# Patient Record
Sex: MALE | Race: WHITE | NOT HISPANIC OR LATINO | Employment: OTHER | URBAN - METROPOLITAN AREA
[De-identification: names, ages, dates, MRNs, and addresses within clinical notes are randomized per-mention and may not be internally consistent; named-entity substitution may affect disease eponyms.]

---

## 2017-01-05 ENCOUNTER — OFFICE VISIT (OUTPATIENT)
Dept: FAMILY MEDICINE | Facility: CLINIC | Age: 72
End: 2017-01-05
Payer: COMMERCIAL

## 2017-01-05 VITALS
HEART RATE: 64 BPM | WEIGHT: 140 LBS | TEMPERATURE: 97.2 F | SYSTOLIC BLOOD PRESSURE: 134 MMHG | DIASTOLIC BLOOD PRESSURE: 75 MMHG | BODY MASS INDEX: 24.8 KG/M2 | HEIGHT: 63 IN

## 2017-01-05 DIAGNOSIS — Z12.5 SCREENING FOR PROSTATE CANCER: ICD-10-CM

## 2017-01-05 DIAGNOSIS — I10 HYPERTENSION GOAL BP (BLOOD PRESSURE) < 140/90: ICD-10-CM

## 2017-01-05 DIAGNOSIS — R53.83 FATIGUE, UNSPECIFIED TYPE: ICD-10-CM

## 2017-01-05 DIAGNOSIS — K21.9 GASTROESOPHAGEAL REFLUX DISEASE, ESOPHAGITIS PRESENCE NOT SPECIFIED: Primary | ICD-10-CM

## 2017-01-05 DIAGNOSIS — Z11.9 SCREENING EXAMINATION FOR INFECTIOUS DISEASE: ICD-10-CM

## 2017-01-05 DIAGNOSIS — E78.5 HYPERLIPIDEMIA LDL GOAL <70: ICD-10-CM

## 2017-01-05 DIAGNOSIS — Z98.61 CAD S/P PERCUTANEOUS CORONARY ANGIOPLASTY: ICD-10-CM

## 2017-01-05 DIAGNOSIS — I25.10 CAD S/P PERCUTANEOUS CORONARY ANGIOPLASTY: ICD-10-CM

## 2017-01-05 PROCEDURE — 99214 OFFICE O/P EST MOD 30 MIN: CPT | Performed by: FAMILY MEDICINE

## 2017-01-05 RX ORDER — ATORVASTATIN CALCIUM 40 MG/1
40 TABLET, FILM COATED ORAL DAILY
Qty: 90 TABLET | Refills: 3 | Status: SHIPPED | OUTPATIENT
Start: 2017-01-05 | End: 2018-01-30

## 2017-01-05 RX ORDER — LISINOPRIL 5 MG/1
5 TABLET ORAL DAILY
Qty: 90 TABLET | Refills: 3 | Status: SHIPPED | OUTPATIENT
Start: 2017-01-05 | End: 2018-01-26

## 2017-01-05 RX ORDER — NICOTINE POLACRILEX 4 MG/1
20 GUM, CHEWING ORAL DAILY
Qty: 90 TABLET | Refills: 3 | Status: SHIPPED | OUTPATIENT
Start: 2017-01-05 | End: 2017-12-20

## 2017-01-05 RX ORDER — LISINOPRIL 5 MG/1
TABLET ORAL
Refills: 1 | COMMUNITY
Start: 2016-12-19 | End: 2017-01-05

## 2017-01-05 ASSESSMENT — PAIN SCALES - GENERAL: PAINLEVEL: NO PAIN (0)

## 2017-01-05 NOTE — NURSING NOTE
"Chief Complaint   Patient presents with     Lipids     Gastrophageal Reflux     Health Maintenance     Other     bpa       Initial /75 mmHg  Pulse 64  Temp(Src) 97.2  F (36.2  C) (Oral)  Ht 5' 3\" (1.6 m)  Wt 140 lb (63.504 kg)  BMI 24.81 kg/m2 Estimated body mass index is 24.81 kg/(m^2) as calculated from the following:    Height as of this encounter: 5' 3\" (1.6 m).    Weight as of this encounter: 140 lb (63.504 kg).  BP completed using cuff size: regular taken on the left arm  Padmini Reynaga CMA      "

## 2017-01-05 NOTE — MR AVS SNAPSHOT
After Visit Summary   1/5/2017    Donn Allen    MRN: [id number]           Patient Information     Date Of Birth          1945        Visit Information        Provider Department      1/5/2017 10:20 AM Ha Anderson MD [facility]        Today's Diagnoses     Gastroesophageal reflux disease, esophagitis presence not specified    -  1     Hyperlipidemia LDL goal <70         CAD S/P percutaneous coronary angioplasty         Hypertension goal BP (blood pressure) < 140/90         Screening for prostate cancer         Screening examination for infectious disease         Fatigue, unspecified type           Care Instructions    Stay on 5 mg lisinopril daily    Keep working on healthy diet/exercise     Continue other meds as is    Fasting lab appointment    I will send you lab results a few days later         Follow-ups after your visit        Your next 10 appointments already scheduled     Jan 06, 2017  9:45 AM   LAB with CP LAB   [facility] ([facility]    [address] [id number] [phone]           Patient must bring picture ID.  Patient should be prepared to give a urine specimen  Please do not eat 10-12 hours before your appointment if you are coming in fasting for labs on lipids, cholesterol, or glucose (sugar).  Pregnant women should follow their Care Team instructions. Water with medications is okay. Do not drink coffee or other fluids.   If you have concerns about taking  your medications, please ask at office or if scheduling via [employer], send a message by clicking on Secure Messaging, Message Your Care Team.            May 24, 2017  1:00 PM   New Visit with Loyd Garcia MD   [facility] ([facility])    [address]  [place] [address]   [phone]              Future tests that were ordered for you today     Open Future Orders         "Priority Expected Expires Ordered    Lipid panel reflex to direct LDL Routine  6/5/2017 1/5/2017    Comprehensive metabolic panel Routine  6/5/2017 1/5/2017    TSH with free T4 reflex Routine  6/5/2017 1/5/2017    CBC with platelets differential Routine  6/5/2017 1/5/2017    Prostate spec antigen screen Routine  6/5/2017 1/5/2017    Hepatitis C antibody Routine  6/5/2017 1/5/2017            Who to contact     If you have questions or need follow up information about today's clinic visit or your schedule please contact [facility] directly at [phone].  Normal or non-critical lab and imaging results will be communicated to you by [employer], letter or phone within 4 business days after the clinic has received the results. If you do not hear from us within 7 days, please contact the clinic through [employer] or phone. If you have a critical or abnormal lab result, we will notify you by phone as soon as possible.  Submit refill requests through [employer] or call your pharmacy and they will forward the refill request to us. Please allow 3 business days for your refill to be completed.          Additional Information About Your Visit        [employer] Information     [employer] gives you secure access to your electronic health record. If you see a primary care provider, you can also send messages to your care team and make appointments. If you have questions, please call your primary care clinic.  If you do not have a primary care provider, please call [phone] and they will assist you.        Care EveryWhere ID     This is your Care EveryWhere ID. This could be used by other organizations to access your [place] medical records  [id number]        Your Vitals Were     Pulse Temperature Height BMI (Body Mass Index)          64 97.2  F (36.2  C) (Oral) 5' 3\" (1.6 m) 24.81 kg/m2         Blood Pressure from Last 3 Encounters:   01/05/17 134/75   12/21/16 139/81   06/20/16 122/72    Weight from Last 3 " Encounters:   01/05/17 140 lb (63.504 kg)   06/20/16 134 lb (60.782 kg)   04/26/16 142 lb (64.411 kg)                 Today's Medication Changes          These changes are accurate as of: 1/5/17 11:04 AM.  If you have any questions, ask your nurse or doctor.               These medicines have changed or have updated prescriptions.        Dose/Directions    lisinopril 5 MG tablet   Commonly known as:  PRINIVIL/ZESTRIL   This may have changed:    - how much to take  - how to take this  - when to take this   Used for:  Hypertension goal BP (blood pressure) < 140/90   Changed by:  Ha Anderson MD        Dose:  5 mg   Take 1 tablet (5 mg) by mouth daily   Quantity:  90 tablet   Refills:  3            Where to get your medicines      These medications were sent to [facility]  [place] - [address] [place] [address]  [address] [address]     Phone:  [phone]    - atorvastatin 40 MG tablet  - lisinopril 5 MG tablet  - omeprazole 20 MG tablet             Primary Care Provider Office Phone # Fax #    Ha Anderson [phone]       [facility] [address] [facility] [address]        Thank you!     Thank you for choosing [facility]  for your care. Our goal is always to provide you with excellent care. Hearing back from our patients is one way we can continue to improve our services. Please take a few minutes to complete the written survey that you may receive in the mail after your visit with us. Thank you!             Your Updated Medication List - Protect others around you: Learn how to safely use, store and throw away your medicines at www.disposemymeds.org.          This list is accurate as of: 1/5/17 11:04 AM.  Always use your most recent med list.                   Brand Name Dispense Instructions for use    aspirin 81 MG tablet      Take 1 tablet by mouth daily.       atorvastatin 40 MG tablet    LIPITOR     90 tablet    Take 1 tablet (40 mg) by mouth daily (Will need clinic visit and labs for more refills)       lisinopril 5 MG tablet    PRINIVIL/ZESTRIL    90 tablet    Take 1 tablet (5 mg) by mouth daily       omeprazole 20 MG tablet     90 tablet    Take 1 tablet (20 mg) by mouth daily Take 30-60 minutes before a meal.

## 2017-01-05 NOTE — PROGRESS NOTES
SUBJECTIVE:                                                    Donn Allen is a 71 year old male who presents to clinic today for the following health issues:       Hyperlipidemia Follow-Up      Rate your low fat/cholesterol diet?: good    Taking statin?  Yes, no muscle aches from statin    Other lipid medications/supplements?:  none       Amount of exercise or physical activity: 6-7 days/week for an average of 30-45 minutes    Problems taking medications regularly: No    Medication side effects: none    Diet: low salt and low fat/cholesterol         Problem list and histories reviewed & adjusted, as indicated.  Additional history: as documented             HPI      ROS    Some arthritis in feet and legs    Treadmill and airdyne for exercise    Walk/ run with dogs      Physical Exam   Constitutional: He is oriented to person, place, and time and well-developed, well-nourished, and in no distress. No distress.   HENT:   Head: Normocephalic and atraumatic.   Eyes: Conjunctivae are normal.   Neck: Carotid bruit is not present.   Cardiovascular: Normal rate, regular rhythm, normal heart sounds and intact distal pulses.  Exam reveals no gallop and no friction rub.    No murmur heard.  Pulmonary/Chest: Effort normal and breath sounds normal. No respiratory distress. He has no wheezes. He has no rales.   Musculoskeletal: He exhibits no edema.   Neurological: He is alert and oriented to person, place, and time.   Skin: He is not diaphoretic.   Psychiatric: Mood and affect normal.       ASSESSMENT / PLAN:  (K21.9) Gastroesophageal reflux disease, esophagitis presence not specified  (primary encounter diagnosis)  Comment: stable   Plan: omeprazole 20 MG tablet        Refill med     (E78.5) Hyperlipidemia LDL goal <70  Comment: check when fasting   Plan: atorvastatin (LIPITOR) 40 MG tablet, Lipid         panel reflex to direct LDL, Comprehensive         metabolic panel        Refill med     (I25.10,  Z98.61) CAD S/P  percutaneous coronary angioplasty  Comment: no angina; over 2 years since stents   Plan: monitor    (I10) Hypertension goal BP (blood pressure) < 140/90  Comment: at goal   Plan: lisinopril (PRINIVIL/ZESTRIL) 5 MG tablet        Refill low dose acei    (Z12.5) Screening for prostate cancer  Comment: psa   Plan: Prostate spec antigen screen             (Z11.9) Screening examination for infectious disease  Comment: check   Plan: Hepatitis C antibody             (R53.83) Fatigue, unspecified type  Comment: check   Plan: TSH with free T4 reflex, CBC with platelets         differential               I reviewed the patient's medications, allergies, medical history, family history, and social history.    Ha Anderson MD

## 2017-01-05 NOTE — PATIENT INSTRUCTIONS
Stay on 5 mg lisinopril daily    Keep working on healthy diet/exercise     Continue other meds as is    Fasting lab appointment    I will send you lab results a few days later

## 2017-01-06 DIAGNOSIS — Z12.5 SCREENING FOR PROSTATE CANCER: ICD-10-CM

## 2017-01-06 DIAGNOSIS — Z11.9 SCREENING EXAMINATION FOR INFECTIOUS DISEASE: ICD-10-CM

## 2017-01-06 DIAGNOSIS — R53.83 FATIGUE, UNSPECIFIED TYPE: ICD-10-CM

## 2017-01-06 DIAGNOSIS — E78.5 HYPERLIPIDEMIA LDL GOAL <70: ICD-10-CM

## 2017-01-06 LAB
ALBUMIN SERPL-MCNC: 4.3 G/DL (ref 3.4–5)
ALP SERPL-CCNC: 90 U/L (ref 40–150)
ALT SERPL W P-5'-P-CCNC: 43 U/L (ref 0–70)
ANION GAP SERPL CALCULATED.3IONS-SCNC: 9 MMOL/L (ref 3–14)
AST SERPL W P-5'-P-CCNC: 32 U/L (ref 0–45)
BASOPHILS # BLD AUTO: 0 10E9/L (ref 0–0.2)
BASOPHILS NFR BLD AUTO: 0.3 %
BILIRUB SERPL-MCNC: 0.6 MG/DL (ref 0.2–1.3)
BUN SERPL-MCNC: 21 MG/DL (ref 7–30)
CALCIUM SERPL-MCNC: 9.3 MG/DL (ref 8.5–10.1)
CHLORIDE SERPL-SCNC: 103 MMOL/L (ref 94–109)
CHOLEST SERPL-MCNC: 168 MG/DL
CO2 SERPL-SCNC: 26 MMOL/L (ref 20–32)
CREAT SERPL-MCNC: 1.04 MG/DL (ref 0.66–1.25)
DIFFERENTIAL METHOD BLD: ABNORMAL
EOSINOPHIL # BLD AUTO: 0.1 10E9/L (ref 0–0.7)
EOSINOPHIL NFR BLD AUTO: 2.3 %
ERYTHROCYTE [DISTWIDTH] IN BLOOD BY AUTOMATED COUNT: 13.1 % (ref 10–15)
GFR SERPL CREATININE-BSD FRML MDRD: 70 ML/MIN/1.7M2
GLUCOSE SERPL-MCNC: 93 MG/DL (ref 70–99)
HCT VFR BLD AUTO: 40.8 % (ref 40–53)
HDLC SERPL-MCNC: 93 MG/DL
HGB BLD-MCNC: 13.7 G/DL (ref 13.3–17.7)
LDLC SERPL CALC-MCNC: 64 MG/DL
LYMPHOCYTES # BLD AUTO: 2.5 10E9/L (ref 0.8–5.3)
LYMPHOCYTES NFR BLD AUTO: 41.3 %
MCH RBC QN AUTO: 31.6 PG (ref 26.5–33)
MCHC RBC AUTO-ENTMCNC: 33.6 G/DL (ref 31.5–36.5)
MCV RBC AUTO: 94 FL (ref 78–100)
MONOCYTES # BLD AUTO: 0.5 10E9/L (ref 0–1.3)
MONOCYTES NFR BLD AUTO: 7.4 %
NEUTROPHILS # BLD AUTO: 3 10E9/L (ref 1.6–8.3)
NEUTROPHILS NFR BLD AUTO: 48.7 %
NONHDLC SERPL-MCNC: 75 MG/DL
PLATELET # BLD AUTO: 220 10E9/L (ref 150–450)
POTASSIUM SERPL-SCNC: 4.4 MMOL/L (ref 3.4–5.3)
PROT SERPL-MCNC: 8 G/DL (ref 6.8–8.8)
PSA SERPL-ACNC: 3.45 UG/L (ref 0–4)
RBC # BLD AUTO: 4.34 10E12/L (ref 4.4–5.9)
SODIUM SERPL-SCNC: 138 MMOL/L (ref 133–144)
TRIGL SERPL-MCNC: 54 MG/DL
TSH SERPL DL<=0.005 MIU/L-ACNC: 2.96 MU/L (ref 0.4–4)
VARIANT LYMPHS BLD QL SMEAR: PRESENT
WBC # BLD AUTO: 6.1 10E9/L (ref 4–11)

## 2017-01-06 PROCEDURE — 86803 HEPATITIS C AB TEST: CPT | Mod: 90 | Performed by: FAMILY MEDICINE

## 2017-01-06 PROCEDURE — 80061 LIPID PANEL: CPT | Performed by: FAMILY MEDICINE

## 2017-01-06 PROCEDURE — G0103 PSA SCREENING: HCPCS | Performed by: FAMILY MEDICINE

## 2017-01-06 PROCEDURE — 80053 COMPREHEN METABOLIC PANEL: CPT | Performed by: FAMILY MEDICINE

## 2017-01-06 PROCEDURE — 36415 COLL VENOUS BLD VENIPUNCTURE: CPT | Performed by: FAMILY MEDICINE

## 2017-01-06 PROCEDURE — 85025 COMPLETE CBC W/AUTO DIFF WBC: CPT | Performed by: FAMILY MEDICINE

## 2017-01-06 PROCEDURE — 84443 ASSAY THYROID STIM HORMONE: CPT | Performed by: FAMILY MEDICINE

## 2017-01-06 PROCEDURE — 99000 SPECIMEN HANDLING OFFICE-LAB: CPT | Performed by: FAMILY MEDICINE

## 2017-01-09 LAB — HCV AB SERPL QL IA: NORMAL

## 2017-01-11 ENCOUNTER — OFFICE VISIT (OUTPATIENT)
Dept: FAMILY MEDICINE | Facility: CLINIC | Age: 72
End: 2017-01-11
Payer: COMMERCIAL

## 2017-01-11 VITALS
WEIGHT: 140 LBS | HEART RATE: 75 BPM | SYSTOLIC BLOOD PRESSURE: 109 MMHG | BODY MASS INDEX: 24.81 KG/M2 | TEMPERATURE: 96.9 F | DIASTOLIC BLOOD PRESSURE: 64 MMHG

## 2017-01-11 DIAGNOSIS — J30.89 PERENNIAL ALLERGIC RHINITIS, UNSPECIFIED ALLERGIC RHINITIS TRIGGER: Primary | ICD-10-CM

## 2017-01-11 DIAGNOSIS — H69.90 DYSFUNCTION OF EUSTACHIAN TUBE, UNSPECIFIED LATERALITY: ICD-10-CM

## 2017-01-11 DIAGNOSIS — I10 HYPERTENSION GOAL BP (BLOOD PRESSURE) < 140/90: ICD-10-CM

## 2017-01-11 DIAGNOSIS — Z91.09 ENVIRONMENTAL ALLERGIES: ICD-10-CM

## 2017-01-11 PROCEDURE — 99213 OFFICE O/P EST LOW 20 MIN: CPT | Performed by: FAMILY MEDICINE

## 2017-01-11 RX ORDER — PSEUDOEPHEDRINE HCL 120 MG/1
120 TABLET, FILM COATED, EXTENDED RELEASE ORAL EVERY 12 HOURS PRN
Qty: 28 TABLET | Refills: 1 | Status: SHIPPED | OUTPATIENT
Start: 2017-01-11 | End: 2017-02-17

## 2017-01-11 RX ORDER — FLUTICASONE PROPIONATE 50 MCG
1-2 SPRAY, SUSPENSION (ML) NASAL DAILY
Qty: 1 BOTTLE | Refills: 11 | Status: SHIPPED | OUTPATIENT
Start: 2017-01-11 | End: 2018-02-05

## 2017-01-11 ASSESSMENT — PAIN SCALES - GENERAL: PAINLEVEL: NO PAIN (0)

## 2017-01-11 NOTE — NURSING NOTE
"Chief Complaint   Patient presents with     Ear Problem     ear plugged       Initial /64 mmHg  Pulse 75  Temp(Src) 96.9  F (36.1  C) (Oral)  Wt 140 lb (63.504 kg) Estimated body mass index is 24.81 kg/(m^2) as calculated from the following:    Height as of 1/5/17: 5' 3\" (1.6 m).    Weight as of this encounter: 140 lb (63.504 kg).  BP completed using cuff size: regular taken on the left arm  Padmini Reynaga CMA      "

## 2017-01-11 NOTE — PROGRESS NOTES
SUBJECTIVE:                                                    Donn Allen is a 71 year old male who presents to clinic today for the following health issues:       Right ear plugged for the past 4 days         Problem list and histories reviewed & adjusted, as indicated.  Additional history: as documented             HPI      ROS  Hit pipe with a clamp, and had sudden onset ringing in right ear    No pain    Feels plugged since then    Been waiting for it to open up and hasn't yet    Congested nasally ongoing, allergies    Not worse recently    If patient swims in cold water has to use earplugs on right or else it plugs up; this has been going on for years        Physical Exam   Constitutional: He is oriented to person, place, and time and well-developed, well-nourished, and in no distress. No distress.   HENT:   Head: Normocephalic and atraumatic.   Right Ear: Tympanic membrane, external ear and ear canal normal.   Left Ear: Tympanic membrane, external ear and ear canal normal.   Mouth/Throat: Oropharynx is clear and moist.   Nasal mucosa red, swollen bilaterally; not tender over sinuses or submandib area   Eyes: Conjunctivae are normal.   Neck: Carotid bruit is not present.   Cardiovascular: Normal rate, regular rhythm, normal heart sounds and intact distal pulses.  Exam reveals no gallop and no friction rub.    No murmur heard.  Pulmonary/Chest: Effort normal and breath sounds normal. No respiratory distress. He has no wheezes. He has no rales.   Musculoskeletal: He exhibits no edema.   Neurological: He is alert and oriented to person, place, and time.   Skin: He is not diaphoretic.   Psychiatric: Mood and affect normal.       ASSESSMENT / PLAN:  (J30.89) Perennial allergic rhinitis, unspecified allergic rhinitis trigger  (primary encounter diagnosis)  Comment: prudent to try nasal steroid spray.  Use for several weeks as trial.   Plan: fluticasone (FLONASE) 50 MCG/ACT spray             (H69.80) Dysfunction  of eustachian tube, unspecified laterality  Comment: no need to irrigate ears out.  No wax present.  Likely eustacian tube issue.   Plan: pseudoePHEDrine (SUDAFED) 120 MG 12 hr tablet        Trial of this as needed.     (I10) Hypertension goal BP (blood pressure) < 140/90  Comment: at goal.   Possible effect on blood pressure from sudafed discussed.   Plan: continue same blood pressure meds     (Z91.09) Environmental allergies  Comment: could also use over the counter loratadine for histamine related symptoms   Plan: discussed    Also reviewed recent labs, all good       I reviewed the patient's medications, allergies, medical history, family history, and social history.    Ha Anderson MD

## 2017-01-11 NOTE — PATIENT INSTRUCTIONS
Start the nasal steroid spray and take on regular basis once daily for at least 2-3 weeks.  Okay to continue longer term if helpful.    Use the pseudophedrine as needed for eustacian tube dysfunction / head congestion    Could use over the counter loratadine ( generic claritin ) once daily ( antihistamine )     Call in a few weeks if symptoms not improving

## 2017-01-11 NOTE — MR AVS SNAPSHOT
After Visit Summary   1/11/2017    Donn Allen    MRN: [id number]           Patient Information     Date Of Birth          1945        Visit Information        Provider Department      1/11/2017 1:20 PM Ha Anderson MD [facility]        Today's Diagnoses     Perennial allergic rhinitis, unspecified allergic rhinitis trigger    -  1     Dysfunction of eustachian tube, unspecified laterality           Care Instructions    Start the nasal steroid spray and take on regular basis once daily for at least 2-3 weeks.  Okay to continue longer term if helpful.    Use the pseudophedrine as needed for eustacian tube dysfunction / head congestion    Could use over the counter loratadine ( generic claritin ) once daily ( antihistamine )     Call in a few weeks if symptoms not improving            Follow-ups after your visit        Your next 10 appointments already scheduled     May 24, 2017  1:00 PM   New Visit with Loyd Garcia MD   [facility] ([facility])    [address] [place] [address] [phone]              Who to contact     If you have questions or need follow up information about today's clinic visit or your schedule please contact [facility] directly at [phone].  Normal or non-critical lab and imaging results will be communicated to you by MyChart, letter or phone within 4 business days after the clinic has received the results. If you do not hear from us within 7 days, please contact the clinic through MyChart or phone. If you have a critical or abnormal lab result, we will notify you by phone as soon as possible.  Submit refill requests through [employer] or call your pharmacy and they will forward the refill request to us. Please allow 3 business days for your refill to be completed.          Additional Information About Your Visit        [employer] Information     [employer] gives you secure access  to your electronic health record. If you see a primary care provider, you can also send messages to your care team and make appointments. If you have questions, please call your primary care clinic.  If you do not have a primary care provider, please call [phone] and they will assist you.        Care EveryWhere ID     This is your Care EveryWhere ID. This could be used by other organizations to access your [place] medical records  [id number]        Your Vitals Were     Pulse Temperature                75 96.9  F (36.1  C) (Oral)           Blood Pressure from Last 3 Encounters:   01/11/17 109/64   01/05/17 134/75   12/21/16 139/81    Weight from Last 3 Encounters:   01/11/17 140 lb (63.504 kg)   01/05/17 140 lb (63.504 kg)   06/20/16 134 lb (60.782 kg)              Today, you had the following     No orders found for display         Today's Medication Changes          These changes are accurate as of: 1/11/17  1:39 PM.  If you have any questions, ask your nurse or doctor.               Start taking these medicines.        Dose/Directions    fluticasone 50 MCG/ACT spray   Commonly known as:  FLONASE   Used for:  Perennial allergic rhinitis, unspecified allergic rhinitis trigger   Started by:  Ha Anderson MD        Dose:  1-2 spray   Spray 1-2 sprays into both nostrils daily   Quantity:  1 Bottle   Refills:  11       pseudoePHEDrine 120 MG 12 hr tablet   Commonly known as:  SUDAFED   Used for:  Dysfunction of eustachian tube, unspecified laterality   Started by:  Ha Anderson MD        Dose:  120 mg   Take 1 tablet (120 mg) by mouth every 12 hours as needed for congestion   Quantity:  28 tablet   Refills:  1            Where to get your medicines      Some of these will need a paper prescription and others can be bought over the counter.  Ask your nurse if you have questions.     Bring a paper prescription for each of these medications    - fluticasone 50 MCG/ACT spray  - pseudoePHEDrine 120 MG 12 hr  tablet             Primary Care Provider Office Phone # Fax #    Ha Anderson [phone]       [facility] [address]  [facility] [address]        Thank you!     Thank you for choosing [facility]  for your care. Our goal is always to provide you with excellent care. Hearing back from our patients is one way we can continue to improve our services. Please take a few minutes to complete the written survey that you may receive in the mail after your visit with us. Thank you!             Your Updated Medication List - Protect others around you: Learn how to safely use, store and throw away your medicines at www.disposemymeds.org.          This list is accurate as of: 1/11/17  1:39 PM.  Always use your most recent med list.                   Brand Name Dispense Instructions for use    aspirin 81 MG tablet      Take 1 tablet by mouth daily.       atorvastatin 40 MG tablet    LIPITOR    90 tablet    Take 1 tablet (40 mg) by mouth daily (Will need clinic visit and labs for more refills)       fluticasone 50 MCG/ACT spray    FLONASE    1 Bottle    Spray 1-2 sprays into both nostrils daily       lisinopril 5 MG tablet    PRINIVIL/ZESTRIL    90 tablet    Take 1 tablet (5 mg) by mouth daily       omeprazole 20 MG tablet     90 tablet    Take 1 tablet (20 mg) by mouth daily Take 30-60 minutes before a meal.       pseudoePHEDrine 120 MG 12 hr tablet    SUDAFED    28 tablet    Take 1 tablet (120 mg) by mouth every 12 hours as needed for congestion

## 2017-01-23 ENCOUNTER — TELEPHONE (OUTPATIENT)
Dept: FAMILY MEDICINE | Facility: CLINIC | Age: 72
End: 2017-01-23

## 2017-01-23 DIAGNOSIS — H93.19 TINNITUS, UNSPECIFIED LATERALITY: ICD-10-CM

## 2017-01-23 DIAGNOSIS — H93.8X9 PLUGGED FEELING IN EAR, UNSPECIFIED LATERALITY: Primary | ICD-10-CM

## 2017-01-23 NOTE — TELEPHONE ENCOUNTER
Routed to provider to advise on ENT referral.    Alison Jeffrey RN  [facility]

## 2017-01-23 NOTE — TELEPHONE ENCOUNTER
Reason for Call: Request for an order or referral:    Referral being requested: ENT    Date needed: as soon as possible    Has the patient been seen by the PCP for this problem? YES    Additional comments: Patient was seen last week and was told if he was not better you would put a referral into the ENT for him. Please call him when completed.    Phone number Patient can be reached at:  Home number on file [phone] (home)    Best Time:  anytime    Can we leave a detailed message on this number?  YES    Call taken on 1/23/2017 at 7:22 AM by Elda Zaldivar

## 2017-01-23 NOTE — TELEPHONE ENCOUNTER
I see referral in Epic.  I called patient and spoke to him by phone, he is walking his dog, requests I call back and leave the contact phone number on ENT referral on his voicemail, he will hang up and not answer.  Done.  Clinic number provided if he has further questions.  Salina Hyatt RN  [facility]

## 2017-01-25 ENCOUNTER — OFFICE VISIT (OUTPATIENT)
Dept: OTOLARYNGOLOGY | Facility: CLINIC | Age: 72
End: 2017-01-25
Payer: COMMERCIAL

## 2017-01-25 ENCOUNTER — OFFICE VISIT (OUTPATIENT)
Dept: AUDIOLOGY | Facility: CLINIC | Age: 72
End: 2017-01-25
Payer: COMMERCIAL

## 2017-01-25 VITALS — WEIGHT: 139.6 LBS | BODY MASS INDEX: 24.73 KG/M2 | HEIGHT: 63 IN | RESPIRATION RATE: 16 BRPM

## 2017-01-25 DIAGNOSIS — H90.5 SNHL (SENSORINEURAL HEARING LOSS): Primary | ICD-10-CM

## 2017-01-25 DIAGNOSIS — H83.3X3: ICD-10-CM

## 2017-01-25 DIAGNOSIS — H90.3 SENSORINEURAL HEARING LOSS, BILATERAL: Primary | ICD-10-CM

## 2017-01-25 PROCEDURE — 92550 TYMPANOMETRY & REFLEX THRESH: CPT | Performed by: AUDIOLOGIST

## 2017-01-25 PROCEDURE — 99214 OFFICE O/P EST MOD 30 MIN: CPT | Performed by: OTOLARYNGOLOGY

## 2017-01-25 PROCEDURE — 99207 ZZC NO CHARGE LOS: CPT | Performed by: AUDIOLOGIST

## 2017-01-25 PROCEDURE — 92557 COMPREHENSIVE HEARING TEST: CPT | Performed by: AUDIOLOGIST

## 2017-01-25 ASSESSMENT — PAIN SCALES - GENERAL: PAINLEVEL: NO PAIN (0)

## 2017-01-25 NOTE — PROGRESS NOTES
"AUDIOLOGY REPORT    SUBJECTIVE:  Donn Allen is a 71 year old male who was seen in the Audiology Clinic at [facility] for an audiologic evaluation, referred by Dr. Botello. The patient reports a \"plugged\" sensation in the right ear for the last few weeks. He also reports a history of noise exposure with occasional hearing protection worn (construction work, shooting, and railroad work). The patient denies bilateral otalgia, bilateral drainage, bilateral hearing loss, michell-surgeries, and vertigo.     OBJECTIVE:  Otoscopic exam indicates ears are clear of cerumen bilaterally     Pure Tone Thresholds assessed using conventional audiometry with good reliability from 250-8000 Hz bilaterally using insert earphones and circumaural headphones     RIGHT:  normal to moderate primarily sensorineural hearing loss    LEFT:    normal to moderate sensorineural hearing loss    Tympanogram:    RIGHT: normal eardrum mobility    LEFT:   normal eardrum mobility    Reflexes (reported by stimulus ear):  RIGHT: Ipsilateral is present at normal levels  RIGHT: Contralateral is present at normal levels  LEFT:   Ipsilateral is present at normal levels  LEFT:   Contralateral could not maintain a seal.     Speech Reception Threshold:    RIGHT: 20 dB HL    LEFT:   10 dB HL    Word Recognition Score:     RIGHT: 96% at 55 dB HL using NU-6 recorded word list.    LEFT:   100% at 50 dB HL using NU-6 recorded word list.      ASSESSMENT:   Normal to moderate primarily sensorineural hearing loss, bilaterally.     Today s results were discussed with the patient in detail.     PLAN: It is recommended that the patient follow up with Dr. Botello today as scheduled. Retest per ENT or if symptoms worsen. Patient was counseled regarding hearing loss and impact on communication. Please call this clinic with questions regarding these results or recommendations.      Dacia Wilson, F-AAA   Clinical Audiologist, [place] #[id number]   1/25/2017        "

## 2017-01-25 NOTE — PROGRESS NOTES
Chief Complaint - Hearing loss    History of Present Illness - Donn Allen is a 71 year old male who returns to me today with hearing loss in the right ear.  It has been present and noticeable for approximately 2 weeks.  It was sudden in onset after loud noise exposure with a hammer. Had some tinnitus, but that's gone. Now it's plugged.  There is no history of chronic ear disease or ear surgery. Has some surfer's ear.  With regards to recreational, , and work-related noise exposure he has some. The patient denies vertigo, otorrhea, otalgia. I saw him in 2014 for hearing loss and exostosis in ears. He doesn't use aids.     Past Medical History -   Patient Active Problem List   Diagnosis     Advanced directives, counseling/discussion     CAD S/P percutaneous coronary angioplasty     Hyperlipidemia LDL goal <70     Lattice degeneration     Epiretinal membrane, right eye     History of vitrectomy, od      Aphakia     Legal blindness, od     Glaucoma suspect     Lightheadedness     Gastroesophageal reflux disease, esophagitis presence not specified     Hypertension goal BP (blood pressure) < 140/90       Current Medications -   Current outpatient prescriptions:      fluticasone (FLONASE) 50 MCG/ACT spray, Spray 1-2 sprays into both nostrils daily, Disp: 1 Bottle, Rfl: 11     pseudoePHEDrine (SUDAFED) 120 MG 12 hr tablet, Take 1 tablet (120 mg) by mouth every 12 hours as needed for congestion, Disp: 28 tablet, Rfl: 1     omeprazole 20 MG tablet, Take 1 tablet (20 mg) by mouth daily Take 30-60 minutes before a meal., Disp: 90 tablet, Rfl: 3     atorvastatin (LIPITOR) 40 MG tablet, Take 1 tablet (40 mg) by mouth daily (Will need clinic visit and labs for more refills), Disp: 90 tablet, Rfl: 3     lisinopril (PRINIVIL/ZESTRIL) 5 MG tablet, Take 1 tablet (5 mg) by mouth daily, Disp: 90 tablet, Rfl: 3     aspirin 81 MG tablet, Take 1 tablet by mouth daily., Disp: , Rfl:     Allergies -   Allergies   Allergen Reactions  "    Ibuprofen Sodium      ibu     Tetanus Toxoid        Social History -   Social History     Social History     Marital Status:      Spouse Name: Carolyn     Number of Children: 4     Years of Education: 14+     Occupational History     Builder      Semi-retired      Retired     Social History Main Topics     Smoking status: Former Smoker     Types: Cigarettes     Quit date: 01/11/1972     Smokeless tobacco: Never Used      Comment: non-smoking household     Alcohol Use: Yes      Comment: Whiskey-2 shots daily till Oct. 2014     Drug Use: No     Sexual Activity:     Partners: Female     Other Topics Concern     None     Social History Narrative       Family History -   Family History   Problem Relation Age of Onset     CANCER Father 73     lymphoma     CANCER Brother 73     pancreatic cancer 1/2 brother     CANCER Brother 83     stomach? 1/2 brother     DIABETES Maternal Aunt      CEREBROVASCULAR DISEASE No family hx of      C.A.D. No family hx of      Alzheimer Disease No family hx of      Neurologic Disorder No family hx of        Review of Systems - As per HPI and PMHx, otherwise 7 system review of the head and neck negative.    Exam -   Resp 16  Ht 1.6 m (5' 3\")  Wt 63.322 kg (139 lb 9.6 oz)  BMI 24.74 kg/m2  General - The patient is in no distress.  Alert and oriented to person and place, answers questions and cooperates with examination appropriately.   Voice and Breathing - The patient was breathing comfortably without the use of accessory muscles. There was no wheezing, stridor, or stertor.  The patients voice was clear and strong.  Ears - The auricles are normal. Has exostosis in each ear, but no wax buildup. The tympanic membranes are normal in appearance, bony landmarks are intact.  No retraction, perforation, or masses.  No fluid or purulence was seen in the external canal or the middle ear. No evidence of infection of the middle ear or external canal, cerumen was normal in appearance.  Eyes - " right pupil was dilated.  Neck - Palpation of the occipital, submental, submandibular, internal jugular chain, and supraclavicular nodes did not demonstrate any abnormal lymph nodes or masses. Parotid glands had no masses. Palpation of the thyroid was soft and smooth, with no nodules or goiter appreciated.  The trachea was mobile and midline.  Neurological - Cranial nerves 2 through 12 were grossly intact. House-Brackmann grade 1 out of 6 bilaterally.       Audiologic Studies - An audiogram and tympanogram were performed today as part of the evaluation and personally reviewed. The tympanogram shows a normal Type A curve, with normal canal volume and middle ear pressure.  There is no sign of eustachian tube dysfunction or middle ear effusion.  The audiogram showed a significant down-sloping high frequency sensorineural hearing loss with jon at 4k to suggest noise exposure.  It really hasn't changed significantly since 2014.       Assessment and Plan - Donn Allen is a 71 year old male who presents to me today with hearing loss, sensorineural. Also some noise-induced hearing loss. Most recently he has right ear plugged sensation. This could be worsening noise exposure, or just it is bothering him more. The hearing test was mostly unchanged. I provided reassurance. We discussed hearing protection in noisy environments.    The patient will follow up as necessary for worsening symptoms or changes in symptoms. I have also recommended yearly audiograms.    Thom Botello MD  Otolaryngology  [facility]

## 2017-01-25 NOTE — NURSING NOTE
"Chief Complaint   Patient presents with     RECHECK     Ears. Feel plugged       Initial Resp 16  Ht 1.6 m (5' 3\")  Wt 63.322 kg (139 lb 9.6 oz)  BMI 24.74 kg/m2 Estimated body mass index is 24.74 kg/(m^2) as calculated from the following:    Height as of this encounter: 1.6 m (5' 3\").    Weight as of this encounter: 63.322 kg (139 lb 9.6 oz).  BP completed using cuff size: NA (Not Taken)    Angeline Alvarenga MA    "

## 2017-01-25 NOTE — PATIENT INSTRUCTIONS
General Scheduling Information  To schedule your CT/MRI scan, please contact [place] [facility] at [phone]   [address] [place] [address]    To schedule your Surgery, please contact our Specialty Schedulers at [phone]    ENT Clinic Locations Clinic Hours Telephone Number     [facility]  [address] [place] [address]   Tuesday:       8:00am -- 4:00pm    Wednesday:  8:00am - 4:00pm   To schedule an appointment with   Dr. Botello,   please contact our   Specialty Scheduling Department at:     [phone]       [facility]y Donnelly  [address] [place] [address]   Friday:          8:00am - 4:00pm         Urgent Care Locations Clinic Hours Telephone Numbers     [facility]  [address] [place] [address]     Monday-Friday:     11:00pm - 9:00pm    Saturday-Sunday:  9:00am - 5:00pm   [phone]     [facility]y Donnelly  [address] [place] [address]     Monday-Friday:      5:00pm - 9:00pm     Saturday-Sunday:  9:00am - 5:00pm   [phone]

## 2017-02-17 ENCOUNTER — OFFICE VISIT (OUTPATIENT)
Dept: FAMILY MEDICINE | Facility: CLINIC | Age: 72
End: 2017-02-17
Payer: COMMERCIAL

## 2017-02-17 ENCOUNTER — TELEPHONE (OUTPATIENT)
Dept: FAMILY MEDICINE | Facility: CLINIC | Age: 72
End: 2017-02-17

## 2017-02-17 VITALS
WEIGHT: 131 LBS | SYSTOLIC BLOOD PRESSURE: 129 MMHG | TEMPERATURE: 98 F | DIASTOLIC BLOOD PRESSURE: 73 MMHG | HEART RATE: 74 BPM | BODY MASS INDEX: 23.21 KG/M2

## 2017-02-17 DIAGNOSIS — I10 HYPERTENSION GOAL BP (BLOOD PRESSURE) < 140/90: ICD-10-CM

## 2017-02-17 DIAGNOSIS — R20.2 TINGLING: ICD-10-CM

## 2017-02-17 DIAGNOSIS — H93.8X1 PLUGGED FEELING IN EAR, RIGHT: ICD-10-CM

## 2017-02-17 DIAGNOSIS — M79.10 MUSCLE PAIN: Primary | ICD-10-CM

## 2017-02-17 PROCEDURE — 99213 OFFICE O/P EST LOW 20 MIN: CPT | Performed by: FAMILY MEDICINE

## 2017-02-17 RX ORDER — METHOCARBAMOL 750 MG/1
750 TABLET, FILM COATED ORAL 2 TIMES DAILY
Qty: 45 TABLET | Refills: 1 | Status: SHIPPED | OUTPATIENT
Start: 2017-02-17 | End: 2017-02-17

## 2017-02-17 RX ORDER — METHOCARBAMOL 750 MG/1
750 TABLET, FILM COATED ORAL 2 TIMES DAILY
COMMUNITY
End: 2017-02-17

## 2017-02-17 ASSESSMENT — PAIN SCALES - GENERAL: PAINLEVEL: NO PAIN (0)

## 2017-02-17 NOTE — MR AVS SNAPSHOT
After Visit Summary   2/17/2017    Donn Allen    MRN: [id number]           Patient Information     Date Of Birth          1945        Visit Information        Provider Department      2/17/2017 10:40 AM Ha Anderson MD [facility]        Today's Diagnoses     Muscle pain    -  1    Tingling        Plugged feeling in ear, right        Hypertension goal BP (blood pressure) < 140/90          Care Instructions    Advise scheduling with neurology     Continue nasal steroid spray    Muscle relaxer as needed         Follow-ups after your visit        Additional Services     NEUROLOGY ADULT REFERRAL       Your provider has referred you to: [facility]: [place] [facility] [place] [phone]   [email or web address]  [facility]: [place] [facility] [phone]   [email or web address]  [facility]: [place] [facility] [place] [phone]   [email or web address]  [facility]: [facility] [phone]   [email or web address]/  [facility]: [facility] [phone]   [email or web address]  General Neurology    Reason for Referral: Consult    Please be aware that coverage of these services is subject to the terms and limitations of your health insurance plan.  Call member services at your health plan with any benefit or coverage questions.      Please bring the following with you to your appointment:    (1) Any X-Rays, CTs or MRIs which have been performed.  Contact the facility where they were done to arrange for  prior to your scheduled appointment.    (2) List of current medications  (3) This referral request   (4) Any documents/labs given to you for this referral        PATIENT WITH ODD EAR SYMPTOMS.  ALREADY SAW ENT.                  Your next 10 appointments already  scheduled     May 24, 2017  1:00 PM CDT   New Visit with Loyd Garcia MD   [facility]    [address]  [place] [address] [phone]              Who to contact     If you have questions or need follow up information about today's clinic visit or your schedule please contact [facility] directly at [phone].  Normal or non-critical lab and imaging results will be communicated to you by [employer], letter or phone within 4 business days after the clinic has received the results. If you do not hear from us within 7 days, please contact the clinic through [employer] or phone. If you have a critical or abnormal lab result, we will notify you by phone as soon as possible.  Submit refill requests through [employer] or call your pharmacy and they will forward the refill request to us. Please allow 3 business days for your refill to be completed.          Additional Information About Your Visit        [employer] Information     [employer] gives you secure access to your electronic health record. If you see a primary care provider, you can also send messages to your care team and make appointments. If you have questions, please call your primary care clinic.  If you do not have a primary care provider, please call [phone] and they will assist you.        Care EveryWhere ID     This is your Care EveryWhere ID. This could be used by other organizations to access your [place] medical records  [id number]        Your Vitals Were     Pulse Temperature BMI (Body Mass Index)             74 98  F (36.7  C) (Oral) 23.21 kg/m2          Blood Pressure from Last 3 Encounters:   02/17/17 129/73   01/11/17 109/64   01/05/17 134/75    Weight from Last 3 Encounters:   02/17/17 131 lb (59.4 kg)   01/25/17 139 lb 9.6 oz (63.3 kg)   01/11/17 140 lb (63.5 kg)              We Performed the Following     NEUROLOGY ADULT REFERRAL          Today's Medication Changes           These changes are accurate as of: 2/17/17 11:18 AM.  If you have any questions, ask your nurse or doctor.               Stop taking these medicines if you haven't already. Please contact your care team if you have questions.     pseudoePHEDrine 120 MG 12 hr tablet   Commonly known as:  SUDAFED   Stopped by:  Ha Anderson MD                Where to get your medicines      These medications were sent to [facility]  [place] - [address]  [address] [place] [address]     Phone:  [phone]     methocarbamol 750 MG tablet                Primary Care Provider Office Phone # Fax #    Ha Anderson [phone]       [facility] [address] [facility] [address]        Thank you!     Thank you for choosing [facility]  for your care. Our goal is always to provide you with excellent care. Hearing back from our patients is one way we can continue to improve our services. Please take a few minutes to complete the written survey that you may receive in the mail after your visit with us. Thank you!             Your Updated Medication List - Protect others around you: Learn how to safely use, store and throw away your medicines at www.disposemymeds.org.          This list is accurate as of: 2/17/17 11:18 AM.  Always use your most recent med list.                   Brand Name Dispense Instructions for use    aspirin 81 MG tablet      Take 1 tablet by mouth daily.       atorvastatin 40 MG tablet    LIPITOR    90 tablet    Take 1 tablet (40 mg) by mouth daily (Will need clinic visit and labs for more refills)       fluticasone 50 MCG/ACT spray    FLONASE    1 Bottle    Spray 1-2 sprays into both nostrils daily       lisinopril 5 MG tablet    PRINIVIL/ZESTRIL    90 tablet    Take 1 tablet (5 mg) by mouth daily       methocarbamol 750 MG tablet    ROBAXIN    45 tablet    Take 1 tablet (750 mg) by mouth 2  times daily 1-2 tablets every 6 hours as needed       omeprazole 20 MG tablet     90 tablet    Take 1 tablet (20 mg) by mouth daily Take 30-60 minutes before a meal.

## 2017-02-17 NOTE — NURSING NOTE
"Chief Complaint   Patient presents with     Ear Problem       Initial /73 (BP Location: Left arm, Patient Position: Chair, Cuff Size: Adult Small)  Pulse 74  Temp 98  F (36.7  C) (Oral)  Wt 131 lb (59.4 kg)  BMI 23.21 kg/m2 Estimated body mass index is 23.21 kg/(m^2) as calculated from the following:    Height as of 1/25/17: 5' 3\" (1.6 m).    Weight as of this encounter: 131 lb (59.4 kg).  Medication Reconciliation: complete   Padmini Reynaga CMA      "

## 2017-02-17 NOTE — PROGRESS NOTES
SUBJECTIVE:                                                    Donn Allen is a 71 year old male who presents to clinic today for the following health issues:       Follow up on right ear problems. Patient did see ENT the end of January         Problem list and histories reviewed & adjusted, as indicated.  Additional history: as documented            We reviewed the ENT note in detail from late January    Feels a tingling sensation    Sometimes a weird nerve like sensation    Hearing okay    No ear pain    Maybe a flu like illness recently    Vomited, diarrhea    Even affected back    Patient would like refill of muscle relaxer    Tried a neti pot    Nose does not feel dry    The sudafed did not help        Physical Exam   Constitutional: He is oriented to person, place, and time and well-developed, well-nourished, and in no distress. No distress.   HENT:   Head: Normocephalic and atraumatic.   Right Ear: Tympanic membrane, external ear and ear canal normal.   Left Ear: Tympanic membrane, external ear and ear canal normal.   Mouth/Throat: Oropharynx is clear and moist.   Nasal mucosa moderately red/ swollen   Eyes: Conjunctivae are normal.   Neck: Neck supple. Carotid bruit is not present.   Cardiovascular: Normal rate, regular rhythm, normal heart sounds and intact distal pulses.  Exam reveals no gallop and no friction rub.    No murmur heard.  Pulmonary/Chest: Effort normal and breath sounds normal. No respiratory distress. He has no wheezes. He has no rales. He exhibits no tenderness.   No back / costovertebral angle tenderness     Abdominal: Soft. There is no tenderness.   Musculoskeletal: He exhibits no edema.   Lymphadenopathy:     He has no cervical adenopathy.   Neurological: He is alert and oriented to person, place, and time.   Skin: He is not diaphoretic.   Psychiatric: Mood and affect normal.     Ear really bothers in evening    Sleeping okay    If busy during day okay      ASSESSMENT / PLAN:  (M79.1)  Muscle pain  (primary encounter diagnosis)  Comment: needs refill   Plan: methocarbamol (ROBAXIN) 750 MG tablet        Only uses very occasionally     (R20.2) Tingling  Comment: peculiar symptoms.  Already had recent ENT eval.  Patient will see neurology to get their opinion  Plan: NEUROLOGY ADULT REFERRAL        Patient to schedule     (H93.8X1) Plugged feeling in ear, right  Comment: as above   Plan: NEUROLOGY ADULT REFERRAL             (I10) Hypertension goal BP (blood pressure) < 140/90  Comment: at goal   Plan: no change       I reviewed the patient's medications, allergies, medical history, family history, and social history.    Ha Anderson MD

## 2017-02-17 NOTE — TELEPHONE ENCOUNTER
PA is needed for the medication, Methocarmbamol 750mg. Would you like to start PA or Rx alternative medication? If PA, please list all medications this patient has tried along with any contraindications that may have been experienced in the past. Thank you.    Pharmacy: [facility]  Phone: [phone]    Insurance Plan: [employer]  Phone:   Pt ID:   Group:  Medication is requiring PA due to high risk in elderly patient. Will send to Dr. Anderson.    Forwarded to Dr. Anderson for review.

## 2017-02-20 NOTE — TELEPHONE ENCOUNTER
Patient returned call to RN line and left message for call back to him at [phone].  Salina Hyatt RN  [facility]

## 2017-02-20 NOTE — TELEPHONE ENCOUNTER
Called patient at [phone] (home) to notify of message below from provider. Unable to reach, left a VM to call back to RN triage line.    Alison Jeffrey RN  [facility]

## 2017-03-01 ENCOUNTER — OFFICE VISIT (OUTPATIENT)
Dept: NEUROLOGY | Facility: CLINIC | Age: 72
End: 2017-03-01
Payer: COMMERCIAL

## 2017-03-01 VITALS
DIASTOLIC BLOOD PRESSURE: 64 MMHG | HEIGHT: 63 IN | HEART RATE: 71 BPM | SYSTOLIC BLOOD PRESSURE: 112 MMHG | WEIGHT: 140.2 LBS | BODY MASS INDEX: 24.84 KG/M2

## 2017-03-01 DIAGNOSIS — H93.8X1 SENSATION OF FULLNESS IN RIGHT EAR: Primary | ICD-10-CM

## 2017-03-01 PROCEDURE — 99214 OFFICE O/P EST MOD 30 MIN: CPT | Performed by: PSYCHIATRY & NEUROLOGY

## 2017-03-01 NOTE — PROGRESS NOTES
"                                                               ESTABLISHED PATIENT NEUROLOGY NOTE    LOCATION: [facility]   DATE OF VISIT: March 1, 2017   DATE OF LAST VISIT: March, 3, 2015  PRIMARY CARE PROVIDER: Ha Anderson MD     REASON FOR VISIT: \"Nerve damage right ear\"    HISTORY OF PRESENT ILLNESS ([place]): Mr. Donn Allen previously referred by Ha Anderson MD.   71 year old LEFT-handed man with concern for \"Right ear  nerve damage.\" For last one and a half months he has had constant tingling in the right ear. He further adds that the pain is as if there is a \"writhing movement of a \"worm\" in his ear. His ear also feels plugged. He does not experience the tingling while he is preoccupied. No tingling in the left ear. The tingling in his ear previously dissipated when bending over to brush his dog's teeth, but in clinic today he notes that when he bends forward, the tingling sensation in his ear persists. Walking also decreases the tingling. No ear pain or blood from either ear. Denies any history of vertigo or dizziness. No history of plugged ears in the past. While installing a gas pipe at home, he hit the pipe wrong and heard a sharp noise in his right ear. After this, his right ear felt like it was full of water.   He has no issues with balance and is able to walk without support. No history of falls.   No paresthesias of hands and feet on either side.     Towards the end of the consultation I came to know from him that he has already seen Dr. Botello regarding this particular symptom. He mentioned in his note about findings of his diminished hearing.     PREVIOUS DIAGNOSTIC STUDIES REVIEWED:   MR BRAIN WITH AND WITHOUT CONTRAST February 10, 2015 12:01 PM     HISTORY: Balance problems.     TECHNIQUE: Routine pulse sequences without contrast. 6 mL of Gadavist  given.     FINDINGS: Diffusion-weighted images are normal. The brain parenchyma,  brainstem, ventricular system, subarachnoid spaces, and " vascular  structures are normal in appearance. Specifically, there is no  evidence for intracranial hemorrhage, acute infarct, or any focal mass  lesions. Incidental is made of a partially empty sella.     IMPRESSION  IMPRESSION: Negative brain and brainstem MRI examination without  contrast.     EARLE LAINEZ MD    Reviewed and updated in [facility]:    Current Outpatient Prescriptions on File Prior to Visit:  tiZANidine (ZANAFLEX) 4 MG tablet Take 1 tablet (4 mg) by mouth 3 times daily as needed for muscle spasms   fluticasone (FLONASE) 50 MCG/ACT spray Spray 1-2 sprays into both nostrils daily   omeprazole 20 MG tablet Take 1 tablet (20 mg) by mouth daily Take 30-60 minutes before a meal.   atorvastatin (LIPITOR) 40 MG tablet Take 1 tablet (40 mg) by mouth daily (Will need clinic visit and labs for more refills)   lisinopril (PRINIVIL/ZESTRIL) 5 MG tablet Take 1 tablet (5 mg) by mouth daily   aspirin 81 MG tablet Take 1 tablet by mouth daily.     No current facility-administered medications on file prior to visit.   Past Medical History   Diagnosis Date     Aphakia      Arthritis      CAD S/P percutaneous coronary angioplasty 11/4/2014     Coronary artery disease      Environmental allergies      Hypertension goal BP (blood pressure) < 140/90 1/5/2017     Lightheadedness 3/11/2015     Retinal detachment 1990s     right eye     Strabismus      Past Surgical History   Procedure Laterality Date     Rotator cuff repair rt/lt Bilateral 1985     C stomach surgery procedure unlisted  1963     Partial gastrectomy-h/o ulcers     C foot/toes surgery proc unlisted Right 1982     trauma     Destruc retinal lesn,cryotherapy Right 1994     Vasectomy       Colonoscopy  3/18/2014     Procedure: COLONOSCOPY;  COLONOSCOPY SCREEN/ EGD UPPER GI ENDOSCOPY/ LOSS OF WEIGHT/ DEAL;  Surgeon: Nick Underwood MD;  Location:  OR     Esophagoscopy, gastroscopy, duodenoscopy (egd), combined  3/18/2014     Procedure: COMBINED  "ESOPHAGOSCOPY, GASTROSCOPY, DUODENOSCOPY (EGD), BIOPSY SINGLE OR MULTIPLE;;  Surgeon: Nick Underwood MD;  Location: [facility] OR     Cardiac catherization  Nov 2014     2 stents      Cataract iol, rt/lt       Retinal reattachment       Social History     Social History     Marital status:      Spouse name: Carolyn     Number of children: 4     Years of education: 14+     Occupational History     Builder Retired     Semi-retired     Social History Main Topics     Smoking status: Former Smoker     Types: Cigarettes     Quit date: 1/11/1972     Smokeless tobacco: Never Used      Comment: non-smoking household     Alcohol use Yes      Comment: Whiskey-1 shots daily      Drug use: No     Sexual activity: Yes     Partners: Female     Other Topics Concern     None     Social History Narrative     This document serves as a record of the services and decisions personally performed and made by Jossue Chawla MD. It was created on his behalf by Dena Leos, a trained medical scribe. The creation of this document is based the provider's statements to the medical scribe.  Scribjose luis Leos 3/1/2017     GENERAL EXAMINATION:   General appearance: Pleasant male sitting comfortably in a chair  /64 (BP Location: Right arm, Patient Position: Chair, Cuff Size: Adult Regular)  Pulse 71  Ht 1.6 m (5' 3\")  Wt 63.6 kg (140 lb 3.2 oz)  BMI 24.84 kg/m2     LIMITED NEUROLOGICAL EXAMINATION:   - No carotid bruit bilaterally.   - Left pupil small in size and reactive to light. Right pupil dilated and fixed, not reactive to light.   - Right fundus shows pale disc on the right side. Left fundus could not be seen clearly because of the reflection of the light.   - Difficulty with finger counting- right eye.   - Normal visual acuity with the left eye.   - No field defect with confrontation method.   - Vibration sensation diminished.   - Normal strength in all muscle groups of both upper and lower limbs.   - Deep tendon " reflexes equal and symmetrical in all limbs, including ankle jerks.   - Down going plantars.   - Heel-shin test normal bilaterally.     IMPRESSION:   Encounter Diagnoses   Name Primary?     Sensation of fullness in right ear-along with tingling Yes     COMMENTS: I do not have a specific neurological explanation for his right ear symptoms. He has already been seen by ENT specialist Dr. Botello. I discussed the case with Dr. Botello and he stated that he would be happy to see him in follow up.     PLAN/RECOMMENDATIONS:   Patient Instructions   AFTER VISIT SUMMARY (AVS)    Please call ENT at [place] [phone] to have follow up appointment with Dr. Botello. He will be happy to see you again.     Diagnostic possibilities reviewed    Preventive Neurology: Encouraged to keep physically and mentally active with particular emphasis on daily stretching exercises, walking, and healthy eating.    Additional  recommendations after the work-up    To call us for follow-up appointment in next 3 month(s) or earlier if needed.    Thanks to Ha Anderson MD for allowing me to take part in Donn Allen's care. Please call me if you have any questions or concerns.    Time with patient 25 minutes minutes, greater than 50% of which was counseling and coordination of care.    The information in this document, created by the medical scribe for me, accurately reflects the services I personally performed and the decisions made by me. I have reviewed and approved this document for accuracy prior to leaving the patient care area.  Jossue Chawla MD, [facility]  11:05 AM, 03/01/17      Jossue Chawla MD, [facility]  Neurologist    Cc: Ha Anderson MD

## 2017-03-01 NOTE — PATIENT INSTRUCTIONS
AFTER VISIT SUMMARY (AVS)    Please call ENT at [place] [phone] to have follow up appointment with Dr. Botello. He will be happy to see you again.     Diagnostic possibilities reviewed    Preventive Neurology: Encouraged to keep physically and mentally active with particular emphasis on daily stretching exercises, walking, and healthy eating.    Additional  recommendations after the work-up    To call us for follow-up appointment in next 3 month(s) or earlier if needed.    Thanks

## 2017-03-01 NOTE — MR AVS SNAPSHOT
After Visit Summary   3/1/2017    Donn Allen    MRN: [id number]           Patient Information     Date Of Birth          1945        Visit Information        Provider Department      3/1/2017 10:40 AM Jossue Chawla MD [facility]        Today's Diagnoses     Sensation of fullness in right ear-along with tingling    -  1      Care Instructions    AFTER VISIT SUMMARY (AVS)    Please call ENT at [place] [phone] to have follow up appointment with Dr. Botello. He will be happy to see you again.     Diagnostic possibilities reviewed    Preventive Neurology: Encouraged to keep physically and mentally active with particular emphasis on daily stretching exercises, walking, and healthy eating.    Additional  recommendations after the work-up    To call us for follow-up appointment in next 3 month(s) or earlier if needed.    Thanks         Follow-ups after your visit        Follow-up notes from your care team     Return in about 3 months (around 6/1/2017).      Your next 10 appointments already scheduled     May 24, 2017  1:00 PM CDT   New Visit with Loyd Garcia MD   [facility] ([facility]    [address] [address] [phone]              Who to contact     If you have questions or need follow up information about today's clinic visit or your schedule please contact [facility] directly at [phone].  Normal or non-critical lab and imaging results will be communicated to you by MyChart, letter or phone within 4 business days after the clinic has received the results. If you do not hear from us within 7 days, please contact the clinic through MyChart or phone. If you have a critical or abnormal lab result, we will notify you by phone as soon as possible.  Submit refill requests through [employer] or call your pharmacy and they will forward the refill request to us. Please allow 3 business days for your refill to  "be completed.          Additional Information About Your Visit        [employer] Information     [employer] gives you secure access to your electronic health record. If you see a primary care provider, you can also send messages to your care team and make appointments. If you have questions, please call your primary care clinic.  If you do not have a primary care provider, please call [phone] and they will assist you.        Care EveryWhere ID     This is your Care EveryWhere ID. This could be used by other organizations to access your [place] medical records  [id number]        Your Vitals Were     Pulse Height BMI (Body Mass Index)             71 1.6 m (5' 3\") 24.84 kg/m2          Blood Pressure from Last 3 Encounters:   03/01/17 112/64   02/17/17 129/73   01/11/17 109/64    Weight from Last 3 Encounters:   03/01/17 63.6 kg (140 lb 3.2 oz)   02/17/17 59.4 kg (131 lb)   01/25/17 63.3 kg (139 lb 9.6 oz)              Today, you had the following     No orders found for display       Primary Care Provider Office Phone # Fax #    Ha Anderson [phone]       [facility] [address] [facility] [address]        Thank you!     Thank you for choosing [facility]  for your care. Our goal is always to provide you with excellent care. Hearing back from our patients is one way we can continue to improve our services. Please take a few minutes to complete the written survey that you may receive in the mail after your visit with us. Thank you!             Your Updated Medication List - Protect others around you: Learn how to safely use, store and throw away your medicines at www.disposemymeds.org.          This list is accurate as of: 3/1/17  3:50 PM.  Always use your most recent med list.                   Brand Name Dispense Instructions for use    aspirin 81 MG tablet      Take 1 tablet by mouth daily.       atorvastatin 40 MG tablet    LIPITOR    90 tablet    " Take 1 tablet (40 mg) by mouth daily (Will need clinic visit and labs for more refills)       fluticasone 50 MCG/ACT spray    FLONASE    1 Bottle    Spray 1-2 sprays into both nostrils daily       lisinopril 5 MG tablet    PRINIVIL/ZESTRIL    90 tablet    Take 1 tablet (5 mg) by mouth daily       omeprazole 20 MG tablet     90 tablet    Take 1 tablet (20 mg) by mouth daily Take 30-60 minutes before a meal.       tiZANidine 4 MG tablet    ZANAFLEX    40 tablet    Take 1 tablet (4 mg) by mouth 3 times daily as needed for muscle spasms

## 2017-03-01 NOTE — NURSING NOTE
"Chief Complaint   Patient presents with     Neurologic Problem     Nerve damage in right ear for about 2 months       Initial /64 (BP Location: Right arm, Patient Position: Chair, Cuff Size: Adult Regular)  Pulse 71  Ht 1.6 m (5' 3\")  Wt 63.6 kg (140 lb 3.2 oz)  BMI 24.84 kg/m2 Estimated body mass index is 24.84 kg/(m^2) as calculated from the following:    Height as of this encounter: 1.6 m (5' 3\").    Weight as of this encounter: 63.6 kg (140 lb 3.2 oz).  Medication Reconciliation: complete   Chasity Hood MA      "

## 2017-03-06 NOTE — PROGRESS NOTES
Called and spoke with patient and explained that he needs to make a follow up appointment with Dr. Ayan Hood MA

## 2017-05-01 ENCOUNTER — TELEPHONE (OUTPATIENT)
Dept: AUDIOLOGY | Facility: CLINIC | Age: 72
End: 2017-05-01

## 2017-06-06 ENCOUNTER — OFFICE VISIT (OUTPATIENT)
Dept: OPHTHALMOLOGY | Facility: CLINIC | Age: 72
End: 2017-06-06
Payer: COMMERCIAL

## 2017-06-06 DIAGNOSIS — Z98.890 HISTORY OF VITRECTOMY: ICD-10-CM

## 2017-06-06 DIAGNOSIS — H25.12 NUCLEAR SCLEROSIS OF LEFT EYE: ICD-10-CM

## 2017-06-06 DIAGNOSIS — H35.412 LATTICE DEGENERATION, LEFT: ICD-10-CM

## 2017-06-06 DIAGNOSIS — H27.01 APHAKIA, RIGHT: ICD-10-CM

## 2017-06-06 DIAGNOSIS — H40.001 GLAUCOMA SUSPECT, RIGHT: Primary | ICD-10-CM

## 2017-06-06 DIAGNOSIS — H35.371 EPIRETINAL MEMBRANE, RIGHT EYE: ICD-10-CM

## 2017-06-06 DIAGNOSIS — H52.4 PRESBYOPIA: ICD-10-CM

## 2017-06-06 PROCEDURE — 92014 COMPRE OPH EXAM EST PT 1/>: CPT | Performed by: STUDENT IN AN ORGANIZED HEALTH CARE EDUCATION/TRAINING PROGRAM

## 2017-06-06 PROCEDURE — 92015 DETERMINE REFRACTIVE STATE: CPT | Performed by: STUDENT IN AN ORGANIZED HEALTH CARE EDUCATION/TRAINING PROGRAM

## 2017-06-06 ASSESSMENT — REFRACTION_MANIFEST
OS_AXIS: 015
OD_ADD: +3.00
OS_SPHERE: -4.25
OS_CYLINDER: +0.50
OD_SPHERE: +8.50
OS_ADD: +3.00
OD_AXIS: 077
OD_CYLINDER: +1.50

## 2017-06-06 ASSESSMENT — VISUAL ACUITY
CORRECTION_TYPE: GLASSES
METHOD: SNELLEN - LINEAR
OS_CC: 20/30
OS_CC+: -1
OD_CC: CF @2'
OS_SC: 1-

## 2017-06-06 ASSESSMENT — REFRACTION_WEARINGRX
OD_SPHERE: -2.25
OD_CYLINDER: +0.50
SPECS_TYPE: SVL
OS_SPHERE: -3.75
OS_AXIS: 116
OS_CYLINDER: +0.25
OD_AXIS: 060

## 2017-06-06 ASSESSMENT — SLIT LAMP EXAM - LIDS
COMMENTS: 1+ DERMATOCHALASIS - UPPER LID
COMMENTS: 1+ DERMATOCHALASIS - UPPER LID

## 2017-06-06 ASSESSMENT — CUP TO DISC RATIO
OS_RATIO: 0.4
OD_RATIO: 0.9

## 2017-06-06 ASSESSMENT — CONF VISUAL FIELD: OD_SUPERIOR_NASAL_RESTRICTION: 1

## 2017-06-06 ASSESSMENT — EXTERNAL EXAM - LEFT EYE: OS_EXAM: 3+ BROW PTOSIS

## 2017-06-06 ASSESSMENT — TONOMETRY
OD_IOP_MMHG: 13
OS_IOP_MMHG: 13
IOP_METHOD: APPLANATION

## 2017-06-06 ASSESSMENT — EXTERNAL EXAM - RIGHT EYE: OD_EXAM: 3+ BROW PTOSIS

## 2017-06-06 NOTE — MR AVS SNAPSHOT
After Visit Summary   6/6/2017    Donn Allen    MRN: [id number]           Patient Information     Date Of Birth          1945        Visit Information        Provider Department      6/6/2017 9:30 AM Loyd Garcia MD [facility]        Today's Diagnoses     Glaucoma suspect, right    -  1    History of vitrectomy, od         Nuclear sclerosis of left eye        Lattice degeneration, left        Aphakia, right        Epiretinal membrane, right eye        Presbyopia, bilateral          Care Instructions    May try Zaditor twice a day as needed for eye itchiness (over-the-counter eyedrop).     Loyd Garcia MD  [phone]            Follow-ups after your visit        Follow-up notes from your care team     Return in about 4 weeks (around 7/4/2017) for BAT, IOP check and discuss cataracts.      Who to contact     If you have questions or need follow up information about today's clinic visit or your schedule please contact [facility] directly at [phone].  Normal or non-critical lab and imaging results will be communicated to you by [employer], letter or phone within 4 business days after the clinic has received the results. If you do not hear from us within 7 days, please contact the clinic through [employer] or phone. If you have a critical or abnormal lab result, we will notify you by phone as soon as possible.  Submit refill requests through [employer] or call your pharmacy and they will forward the refill request to us. Please allow 3 business days for your refill to be completed.          Additional Information About Your Visit        MyChart Information     [employer] gives you secure access to your electronic health record. If you see a primary care provider, you can also send messages to your care team and make appointments. If you have questions, please call your primary care clinic.  If you do not have a primary care provider, please call [phone] and  they will assist you.        Care EveryWhere ID     This is your Care EveryWhere ID. This could be used by other organizations to access your [place] medical records  [id number]         Blood Pressure from Last 3 Encounters:   03/01/17 112/64   02/17/17 129/73   01/11/17 109/64    Weight from Last 3 Encounters:   03/01/17 63.6 kg (140 lb 3.2 oz)   02/17/17 59.4 kg (131 lb)   01/25/17 63.3 kg (139 lb 9.6 oz)              Today, you had the following     No orders found for display       Primary Care Provider Office Phone # Fax #    Ha Anderson [phone]       [address] [address]        Thank you!     Thank you for choosing [facility] [place]  for your care. Our goal is always to provide you with excellent care. Hearing back from our patients is one way we can continue to improve our services. Please take a few minutes to complete the written survey that you may receive in the mail after your visit with us. Thank you!             Your Updated Medication List - Protect others around you: Learn how to safely use, store and throw away your medicines at www.disposemymeds.org.          This list is accurate as of: 6/6/17 10:42 AM.  Always use your most recent med list.                   Brand Name Dispense Instructions for use    aspirin 81 MG tablet      Take 1 tablet by mouth daily.       atorvastatin 40 MG tablet    LIPITOR    90 tablet    Take 1 tablet (40 mg) by mouth daily (Will need clinic visit and labs for more refills)       fluticasone 50 MCG/ACT spray    FLONASE    1 Bottle    Spray 1-2 sprays into both nostrils daily       lisinopril 5 MG tablet    PRINIVIL/ZESTRIL    90 tablet    Take 1 tablet (5 mg) by mouth daily       omeprazole 20 MG tablet     90 tablet    Take 1 tablet (20 mg) by mouth daily Take 30-60 minutes before a meal.       tiZANidine 4 MG tablet    ZANAFLEX    40 tablet    Take 1 tablet (4 mg) by mouth 3 times  daily as needed for muscle spasms

## 2017-06-06 NOTE — PATIENT INSTRUCTIONS
May try Zaditor twice a day as needed for eye itchiness (over-the-counter eyedrop).     Loyd Garcia MD  [phone]

## 2017-06-06 NOTE — PROGRESS NOTES
Current Eye Medications:  elostat for itching (he has a bottle which  in ).      Subjective:  Comprehensive Eye Exam.  Patient complains of hazy vision - he is having a lot of difficulty with his vision.  He avoids driving at night, secondary to his vision.  He is currently being worked up for an ear problem he is experiencing.  This morning his left eye was itching.  He has used Elostat in the past, but hasn't needed it for a few years, until this morning.       Objective:  See Ophthalmology Exam.       Assessment:  Donn Allen is a 72 year old male who presents with:   Encounter Diagnoses   Name Primary?     Glaucoma suspect, right      History of vitrectomy/lensectomy OD, h/o bungee cord trauma with secondary glaucoma , s/p SB placement and subsequent remova      Aphakia, right      Epiretinal membrane, right eye      Nuclear sclerosis of left eye Suspect early visual significance. Obtain BAT left eye and discuss cataract surgery at next visit.     Lattice degeneration, left        Plan:  May try Zaditor twice a day as needed for eye itchiness (over-the-counter eyedrop).     Loyd Garcia MD  [phone]

## 2017-07-11 ENCOUNTER — OFFICE VISIT (OUTPATIENT)
Dept: OPHTHALMOLOGY | Facility: CLINIC | Age: 72
End: 2017-07-11
Payer: COMMERCIAL

## 2017-07-11 ENCOUNTER — TELEPHONE (OUTPATIENT)
Dept: OPHTHALMOLOGY | Facility: CLINIC | Age: 72
End: 2017-07-11

## 2017-07-11 DIAGNOSIS — H35.412 LATTICE DEGENERATION, LEFT: ICD-10-CM

## 2017-07-11 DIAGNOSIS — H40.001 GLAUCOMA SUSPECT, RIGHT: ICD-10-CM

## 2017-07-11 DIAGNOSIS — H35.371 EPIRETINAL MEMBRANE, RIGHT EYE: ICD-10-CM

## 2017-07-11 DIAGNOSIS — H25.12 NUCLEAR SCLEROSIS OF LEFT EYE: Primary | ICD-10-CM

## 2017-07-11 DIAGNOSIS — H27.01 APHAKIA, RIGHT: ICD-10-CM

## 2017-07-11 DIAGNOSIS — Z98.890 HISTORY OF VITRECTOMY: ICD-10-CM

## 2017-07-11 PROCEDURE — 92012 INTRM OPH EXAM EST PATIENT: CPT | Performed by: STUDENT IN AN ORGANIZED HEALTH CARE EDUCATION/TRAINING PROGRAM

## 2017-07-11 ASSESSMENT — PACHYMETRY
OS_CT(UM): .559
OD_CT(UM): .558
EXAM_DATE: 12/5/2016

## 2017-07-11 ASSESSMENT — SLIT LAMP EXAM - LIDS
COMMENTS: 1+ DERMATOCHALASIS - UPPER LID
COMMENTS: 1+ DERMATOCHALASIS - UPPER LID

## 2017-07-11 ASSESSMENT — VISUAL ACUITY
OS_CC+: +1
OS_BAT_MED: 20/50-1
OS_PH_CC: 25-3
METHOD: SNELLEN - LINEAR
OS_BAT_HIGH: 20/60
OS_CC: 20/50
CORRECTION_TYPE: GLASSES

## 2017-07-11 ASSESSMENT — TONOMETRY
OS_IOP_MMHG: 13
OD_IOP_MMHG: 15
IOP_METHOD: APPLANATION

## 2017-07-11 ASSESSMENT — EXTERNAL EXAM - RIGHT EYE: OD_EXAM: 3+ BROW PTOSIS

## 2017-07-11 ASSESSMENT — EXTERNAL EXAM - LEFT EYE: OS_EXAM: 3+ BROW PTOSIS

## 2017-07-11 NOTE — TELEPHONE ENCOUNTER
Patient called - Dr. Garcia gave him the name of an Ophthalmologist in [place] (the patient lives here), to have cataract surgery.  The patient hasn't decided if he will proceed with the surgery with Dr. Garcia or have it by Dr. Chiang.  He contacted Dr. Chiang's office and was told he should have a referral faxed from Dr. Garcia, along with chart notes.   Fax number is:  [phone].    I explained to him that as soon as the chart notes were completed, I would fax the notes along with the Referral to Dr. Chiang.

## 2017-07-11 NOTE — PATIENT INSTRUCTIONS
Natasha MADISON to call to see if you would like to schedule cataract surgery for the left eye    Loyd Garcia MD  [phone]

## 2017-07-11 NOTE — PROGRESS NOTES
Current Eye Medications:  None.     Subjective:  Follow up cataract, left eye.  Patient is here for a BAT, pressure check and cataract discussion.  He is very discouraged by his decreasing vision, left eye.       Objective:  See Ophthalmology Exam.       Assessment:  Donn Allen is a 72 year old male who presents with:   Encounter Diagnoses   Name Primary?     Nuclear sclerosis of left eye Patient desires surgery and BAT 20/60 left eye, [place] here in the [facility] was inconvenient for him).      History of vitrectomy/lensectomy OD, h/o bungee cord trauma with secondary glaucoma 1994-95, s/p SB placement and subsequent removal      Glaucoma suspect, right      Aphakia, right      Epiretinal membrane, right eye      Lattice degeneration, left      Plan:  Natasha MADISON to call to see if you would like to schedule cataract surgery for the left eye    Loyd Garcia MD  [phone]
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
## 2017-07-20 ASSESSMENT — VISUAL ACUITY: OD_CC: CF 2'

## 2017-10-23 ENCOUNTER — OFFICE VISIT (OUTPATIENT)
Dept: FAMILY MEDICINE | Facility: CLINIC | Age: 72
End: 2017-10-23
Payer: COMMERCIAL

## 2017-10-23 VITALS
TEMPERATURE: 97.2 F | OXYGEN SATURATION: 97 % | SYSTOLIC BLOOD PRESSURE: 127 MMHG | WEIGHT: 129 LBS | DIASTOLIC BLOOD PRESSURE: 68 MMHG | BODY MASS INDEX: 22.85 KG/M2 | HEART RATE: 66 BPM

## 2017-10-23 DIAGNOSIS — Z01.818 PREOP GENERAL PHYSICAL EXAM: Primary | ICD-10-CM

## 2017-10-23 DIAGNOSIS — Z23 NEED FOR PROPHYLACTIC VACCINATION AND INOCULATION AGAINST INFLUENZA: ICD-10-CM

## 2017-10-23 DIAGNOSIS — H26.9 CATARACT OF LEFT EYE, UNSPECIFIED CATARACT TYPE: ICD-10-CM

## 2017-10-23 LAB
BASOPHILS # BLD AUTO: 0 10E9/L (ref 0–0.2)
BASOPHILS NFR BLD AUTO: 0.4 %
CREAT SERPL-MCNC: 1.16 MG/DL (ref 0.66–1.25)
DIFFERENTIAL METHOD BLD: ABNORMAL
EOSINOPHIL # BLD AUTO: 0.1 10E9/L (ref 0–0.7)
EOSINOPHIL NFR BLD AUTO: 3 %
ERYTHROCYTE [DISTWIDTH] IN BLOOD BY AUTOMATED COUNT: 13.6 % (ref 10–15)
GFR SERPL CREATININE-BSD FRML MDRD: 62 ML/MIN/1.7M2
HCT VFR BLD AUTO: 38.4 % (ref 40–53)
HGB BLD-MCNC: 13.1 G/DL (ref 13.3–17.7)
LYMPHOCYTES # BLD AUTO: 1.6 10E9/L (ref 0.8–5.3)
LYMPHOCYTES NFR BLD AUTO: 34 %
MCH RBC QN AUTO: 32.1 PG (ref 26.5–33)
MCHC RBC AUTO-ENTMCNC: 34.1 G/DL (ref 31.5–36.5)
MCV RBC AUTO: 94 FL (ref 78–100)
MONOCYTES # BLD AUTO: 0.4 10E9/L (ref 0–1.3)
MONOCYTES NFR BLD AUTO: 8.3 %
NEUTROPHILS # BLD AUTO: 2.5 10E9/L (ref 1.6–8.3)
NEUTROPHILS NFR BLD AUTO: 54.3 %
PLATELET # BLD AUTO: 247 10E9/L (ref 150–450)
POTASSIUM SERPL-SCNC: 4.2 MMOL/L (ref 3.4–5.3)
RBC # BLD AUTO: 4.08 10E12/L (ref 4.4–5.9)
WBC # BLD AUTO: 4.7 10E9/L (ref 4–11)

## 2017-10-23 PROCEDURE — 84132 ASSAY OF SERUM POTASSIUM: CPT | Performed by: FAMILY MEDICINE

## 2017-10-23 PROCEDURE — G0008 ADMIN INFLUENZA VIRUS VAC: HCPCS | Performed by: FAMILY MEDICINE

## 2017-10-23 PROCEDURE — 82565 ASSAY OF CREATININE: CPT | Performed by: FAMILY MEDICINE

## 2017-10-23 PROCEDURE — 90662 IIV NO PRSV INCREASED AG IM: CPT | Performed by: FAMILY MEDICINE

## 2017-10-23 PROCEDURE — 85025 COMPLETE CBC W/AUTO DIFF WBC: CPT | Performed by: FAMILY MEDICINE

## 2017-10-23 PROCEDURE — 36415 COLL VENOUS BLD VENIPUNCTURE: CPT | Performed by: FAMILY MEDICINE

## 2017-10-23 PROCEDURE — 99214 OFFICE O/P EST MOD 30 MIN: CPT | Mod: 25 | Performed by: FAMILY MEDICINE

## 2017-10-23 ASSESSMENT — PAIN SCALES - GENERAL: PAINLEVEL: NO PAIN (0)

## 2017-10-23 NOTE — NURSING NOTE
"Chief Complaint   Patient presents with     Pre-Op Exam     Health Maintenance       Initial /68 (BP Location: Left arm, Patient Position: Chair, Cuff Size: Adult Regular)  Pulse 66  Temp 97.2  F (36.2  C) (Oral)  Wt 129 lb (58.5 kg)  SpO2 97%  BMI 22.85 kg/m2 Estimated body mass index is 22.85 kg/(m^2) as calculated from the following:    Height as of 3/1/17: 5' 3\" (1.6 m).    Weight as of this encounter: 129 lb (58.5 kg).  Medication Reconciliation: complete   Padmini Reynaga CMA      "

## 2017-10-23 NOTE — PROGRESS NOTES
[address] [address] [phone]  Dept: [phone]    PRE-OP EVALUATION:  Today's date: 10/23/2017    Donn Allen (: 1945) presents for pre-operative evaluation assessment as requested by Dr. Garcia.  He requires evaluation and anesthesia risk assessment prior to undergoing surgery/procedure for treatment of cataract .  Proposed procedure: left eye cataract    Date of Surgery/ Procedure: 2017  Time of Surgery/ Procedure:   Hospital/Surgical Facility: ? One of our [facility] locations per patient  Fax number for surgical facility:   Primary Physician: Ha Anderson  Type of Anesthesia Anticipated: Local with MAC and Topical    Patient has a Health Care Directive or Living Will:  NO    Preop Questions 10/23/2017   1.  Do you have a history of heart attack, stroke, stent, bypass or surgery on an artery in the head, neck, heart or legs? YES - 2 stents , no problems since   2.  Do you ever have any pain or discomfort in your chest? YES - as above.  No current pain/ symptoms.   3.  Do you have a history of  Heart Failure? No   4.   Are you troubled by shortness of breath when:  walking on a level surface, or up a slight hill, or at night? No   5.  Do you currently have a cold, bronchitis or other respiratory infection? No   6.  Do you have a cough, shortness of breath, or wheezing? No   7.  Do you sometimes get pains in the calves of your legs when you walk? No   8. Do you or anyone in your family have previous history of blood clots? No   9.  Do you or does anyone in your family have a serious bleeding problem such as prolonged bleeding following surgeries or cuts? No   10. Have you ever had problems with anemia or been told to take iron pills? No   11. Have you had any abnormal blood loss such as black, tarry or bloody stools? No   12. Have you ever had a blood transfusion? YES - long time ago, 18 years old   13. Have you  or any of your relatives ever had problems with anesthesia? NO   14. Do you have sleep apnea, excessive snoring or daytime drowsiness? YES - no diagnosed sleep apnea.  Some sleep issues.   15. Do you have any prosthetic heart valves? No   16. Do you have prosthetic joints? No           HPI:                                                      Brief HPI related to upcoming procedure: 72 year old male with cataracts.  To have left eye done.           MEDICAL HISTORY:                                                    Patient Active Problem List    Diagnosis Date Noted     Hypertension goal BP (blood pressure) < 140/90 01/05/2017     Priority: Medium     Gastroesophageal reflux disease, esophagitis presence not specified 04/26/2016     Priority: Medium     Lightheadedness 03/11/2015     Priority: Medium     Legal blindness, od 01/31/2015     Priority: Medium     Glaucoma suspect 01/31/2015     Priority: Medium     Target IOP:   Max IOP : 16/16  Family history: No  Trauma history: No  OCT: 12/2016 thin S&I right eye, within normal limits left eye   Hurt visual field (HVF): 12/2016   Right eye - Reliable, sup>inf arcuate defect (similar to 2006)   Left eye - Reliable, Normal  Pachymetry: Average 558/559  C/D: 0.9/0.4  SLT:            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  He was accompanied by Carolyn, his wife, who is the health care agent.  Previous ACP  discussions reviewed and questions answered. At this time he came in with a completed directive that we reviewed as an up to date and legal document. Confirmed/documented designated decision maker(s). See permanent comments of demographics in clinical tab. Confirmed current code status reflects current choices. View document(s) and details by clicking on code status.    Added by Meme Cota on 4/4/2014            Patient states has Advance Directive and will bring in a copy to clinic. 1/11/2012   Padmini Reynaga CMA          Past Medical History:   Diagnosis Date     Aphakia      Arthritis      CAD S/P percutaneous coronary angioplasty 11/4/2014     Coronary artery disease      Environmental allergies      Hypertension goal BP (blood pressure) < 140/90 1/5/2017     Lightheadedness 3/11/2015     Retinal detachment 1990s    right eye     Strabismus      Past Surgical History:   Procedure Laterality Date     C FOOT/TOES SURGERY PROC UNLISTED Right 1982    trauma     C STOMACH SURGERY PROCEDURE UNLISTED  1963    Partial gastrectomy-h/o ulcers     CARDIAC CATHERIZATION  Nov 2014    2 stents      CATARACT IOL, RT/LT       COLONOSCOPY  3/18/2014    Procedure: COLONOSCOPY;  COLONOSCOPY SCREEN/ EGD UPPER GI ENDOSCOPY/ LOSS OF WEIGHT/ DEAL;  Surgeon: Nick Underwood MD;  Location: [facility] OR     DESTRUC RETINAL LESN,CRYOTHERAPY Right 1994     ESOPHAGOSCOPY, GASTROSCOPY, DUODENOSCOPY (EGD), COMBINED  3/18/2014    Procedure: COMBINED ESOPHAGOSCOPY, GASTROSCOPY, DUODENOSCOPY (EGD), BIOPSY SINGLE OR MULTIPLE;;  Surgeon: Nick Underwood MD;  Location: [facility] OR     RETINAL REATTACHMENT       ROTATOR CUFF REPAIR RT/LT Bilateral 1985     VASECTOMY       Current Outpatient Prescriptions   Medication Sig Dispense Refill     tiZANidine (ZANAFLEX) 4 MG tablet Take 1 tablet (4 mg) by mouth 3 times daily as needed for muscle spasms 40 tablet 1     fluticasone (FLONASE) 50 MCG/ACT spray Spray 1-2 sprays into both nostrils  daily 1 Bottle 11     omeprazole 20 MG tablet Take 1 tablet (20 mg) by mouth daily Take 30-60 minutes before a meal. 90 tablet 3     atorvastatin (LIPITOR) 40 MG tablet Take 1 tablet (40 mg) by mouth daily (Will need clinic visit and labs for more refills) 90 tablet 3     lisinopril (PRINIVIL/ZESTRIL) 5 MG tablet Take 1 tablet (5 mg) by mouth daily 90 tablet 3     aspirin 81 MG tablet Take 1 tablet by mouth daily.       OTC products: None, except for tylenol    Allergies   Allergen Reactions     Ibuprofen Sodium      ibu     Tetanus Toxoid       Latex Allergy: NO    Social History   Substance Use Topics     Smoking status: Former Smoker     Types: Cigarettes     Quit date: 1/11/1972     Smokeless tobacco: Never Used      Comment: non-smoking household     Alcohol use Yes      Comment: Whiskey-1 shots daily      History   Drug Use No       REVIEW OF SYSTEMS:                                                    Constitutional, neuro, ENT, endocrine, pulmonary, cardiac, gastrointestinal, genitourinary, musculoskeletal, integument and psychiatric systems are negative, except as otherwise noted.    Right thumb irritated.  Has crack in skin, painful.        EXAM:                                                    /68 (BP Location: Left arm, Patient Position: Chair, Cuff Size: Adult Regular)  Pulse 66  Temp 97.2  F (36.2  C) (Oral)  Wt 129 lb (58.5 kg)  SpO2 97%  BMI 22.85 kg/m2    GENERAL APPEARANCE: healthy, alert and no distress     HENT: ear canals and TM's normal and nose and mouth without ulcers or lesions     NECK: no adenopathy, no asymmetry, masses, or scars and thyroid normal to palpation     RESP: lungs clear to auscultation - no rales, rhonchi or wheezes     CV: regular rates and rhythm, normal S1 S2, no S3 or S4 and no murmur, click or rub     ABDOMEN:  soft, nontender, no HSM or masses and bowel sounds normal     MS: extremities normal- no gross deformities noted, no evidence of inflammation in  joints, FROM in all extremities.     SKIN: no suspicious lesions or rashes     NEURO: Normal strength and tone, sensory exam grossly normal, mentation intact and speech normal     PSYCH: mentation appears normal. and affect normal/bright     LYMPHATICS: No axillary, cervical, or supraclavicular nodes    DIAGNOSTICS:                                                    No ekg needed for eye procedure    Potassium, creat, and cbc pending    Recent Labs   Lab Test  01/06/17   0933  03/03/15   0956  11/26/14   2228  11/10/14   0737   03/05/14   0949   HGB  13.7  14.3  12.9*  13.5   < >  13.6   PLT  220  221  180  202   < >  245   NA  138   --   135  139   < >  137   POTASSIUM  4.4   --   4.0  4.4   < >  4.6   CR  1.04   --   0.92  1.08   < >  0.92   A1C   --    --    --   5.7   --   6.0    < > = values in this interval not displayed.        IMPRESSION:                                                    Reason for surgery/procedure: cataract left eye  Diagnosis/reason for consult: pre-op clearance    The proposed surgical procedure is considered LOW risk.    REVISED CARDIAC RISK INDEX  The patient has the following serious cardiovascular risks for perioperative complications such as (MI, PE, VFib and 3  AV Block):  History of cardiac stents, no symptoms currently  INTERPRETATION: 1 risks: Class II (low risk - 0.9% complication rate)    The patient has the following additional risks for perioperative complications:  No identified additional risks      ICD-10-CM    1. Preop general physical exam Z01.818    2. Need for prophylactic vaccination and inoculation against influenza Z23 FLU VACCINE, INCREASED ANTIGEN, PRESV FREE, AGE 65+ [68422]     ADMIN INFLUENZA (For MEDICARE Patients ONLY) []       RECOMMENDATIONS:                                                           --Patient is to take all scheduled medications on the day of surgery EXCEPT for modifications listed below.    Patient will stay on current meds,  including the daily low dose aspirin    He does not take ibuprofen type medications    Tylenol as needed for pain    Blood pressure well controlled     History of 2 coronary stents in past but no problems since then and no current cardiac type symptoms     APPROVAL GIVEN to proceed with proposed procedure, without further diagnostic evaluation, providing labs are okay       Signed Electronically by: Ha Anderson MD    Copy of this evaluation report is provided to requesting physician.    [facility] Preop Guidelines  Injectable Influenza Immunization Documentation    1.  Is the person to be vaccinated sick today?   No    2. Does the person to be vaccinated have an allergy to a component   of the vaccine?   No  Egg Allergy Algorithm Link    3. Has the person to be vaccinated ever had a serious reaction   to influenza vaccine in the past?   No    4. Has the person to be vaccinated ever had Guillain-Barré syndrome?   No    Form completed by Padmini Reynaga CMA

## 2017-10-23 NOTE — PATIENT INSTRUCTIONS
Before Your Surgery      Call your surgeon if there is any change in your health. This includes signs of a cold or flu (such as a sore throat, runny nose, cough, rash or fever).    Do not smoke, drink alcohol or take over the counter medicine (unless your surgeon or primary care doctor tells you to) for the 24 hours before and after surgery.    If you take prescribed drugs: Follow your doctor s orders about which medicines to take and which to stop until after surgery.    Eating and drinking prior to surgery: follow the instructions from your surgeon    Take a shower or bath the night before surgery. Use the soap your surgeon gave you to gently clean your skin. If you do not have soap from your surgeon, use your regular soap. Do not shave or scrub the surgery site.  Wear clean pajamas and have clean sheets on your bed.       Take your usual medications    Avoid ibuprofen    We will send you lab results

## 2017-10-23 NOTE — MR AVS SNAPSHOT
After Visit Summary   10/23/2017    Donn Allen    MRN: [id number]           Patient Information     Date Of Birth          1945        Visit Information        Provider Department      10/23/2017 10:00 AM Ha Anderson MD [facility]        Today's Diagnoses     Preop general physical exam    -  1    Cataract of left eye, unspecified cataract type        Need for prophylactic vaccination and inoculation against influenza          Care Instructions      Before Your Surgery      Call your surgeon if there is any change in your health. This includes signs of a cold or flu (such as a sore throat, runny nose, cough, rash or fever).    Do not smoke, drink alcohol or take over the counter medicine (unless your surgeon or primary care doctor tells you to) for the 24 hours before and after surgery.    If you take prescribed drugs: Follow your doctor s orders about which medicines to take and which to stop until after surgery.    Eating and drinking prior to surgery: follow the instructions from your surgeon    Take a shower or bath the night before surgery. Use the soap your surgeon gave you to gently clean your skin. If you do not have soap from your surgeon, use your regular soap. Do not shave or scrub the surgery site.  Wear clean pajamas and have clean sheets on your bed.       Take your usual medications    Avoid ibuprofen    We will send you lab results            Follow-ups after your visit        Your next 10 appointments already scheduled     Oct 24, 2017  9:45 AM CDT   [facility] Visit with Loyd Garcia MD   [facility]    [address] [facility] [address]   [phone]            Nov 07, 2017   Procedure with Loyd Garcia MD   [facility] (--)    [address]  [facility] [address]   [phone]            Nov 08, 2017 12:45 PM CST   [facility]oyd [facility] MD    [facility]    [address] [facility]  [place] [address]   [phone]            Nov 15, 2017 12:45 PM CST   [facility] Visit with Loyd Garcia MD   [facility]    [address]  [place] [address]   [phone]            Dec 07, 2017 10:30 AM CST   Return Visit with GEORGI Willett MD   [facility] ([facility])    [address] [place] [address] 2nd Floor  [facility] [address]   [phone]            Dec 13, 2017  1:15 PM CST   Return Visit with Loyd Garcia MD   [facility]    [address] [place] [address]   [phone]              Who to contact     If you have questions or need follow up information about today's clinic visit or your schedule please contact [facility] directly at [phone].  Normal or non-critical lab and imaging results will be communicated to you by MyChart, letter or phone within 4 business days after the clinic has received the results. If you do not hear from us within 7 days, please contact the clinic through [employer] or phone. If you have a critical or abnormal lab result, we will notify you by phone as soon as possible.  Submit refill requests through [employer] or call your pharmacy and they will forward the refill request to us. Please allow 3 business days for your refill to be completed.          Additional Information About Your Visit        MyChart Information     [employer] gives you secure access to your electronic health record. If you see a primary care provider, you can also send messages to your care team and make appointments. If you have questions, please call your primary care clinic.  If you do not have a primary care provider, please call [phone] and they will assist you.        Care EveryWhere ID     This is your Care EveryWhere ID.  This could be used by other organizations to access your [facility] medical records  [id number]        Your Vitals Were     Pulse Temperature Pulse Oximetry BMI (Body Mass Index)          66 97.2  F (36.2  C) (Oral) 97% 22.85 kg/m2         Blood Pressure from Last 3 Encounters:   10/23/17 127/68   03/01/17 112/64   02/17/17 129/73    Weight from Last 3 Encounters:   10/23/17 129 lb (58.5 kg)   03/01/17 140 lb 3.2 oz (63.6 kg)   02/17/17 131 lb (59.4 kg)              We Performed the Following     ADMIN INFLUENZA (For MEDICARE Patients ONLY) []     CBC with platelets differential     Creatinine     FLU VACCINE, INCREASED ANTIGEN, PRESV FREE, AGE 65+ [74050]     Potassium        Primary Care Provider Office Phone # Fax #    Ha Anderson [phone]       [address] [place], [place]. \[facility] Outlook.\"" [id number]        Equal Access to Services     [facility] : Hadii aad ku hadasho Soomaali, waaxda luqadaha, qaybta kaalmada adeegyada, brady devinein haydevann nadrew lynn . So [facility] [phone].    ATENCIÓN: Si habla español, tiene a santoro disposición servicios gratuitos de asistencia lingüística. Llame al [phone].    We comply with applicable federal civil rights laws and [place] laws. We do not discriminate on the basis of race, color, national origin, age, disability, sex, sexual orientation, or gender identity.            Thank you!     Thank you for choosing [facility]  for your care. Our goal is always to provide you with excellent care. Hearing back from our patients is one way we can continue to improve our services. Please take a few minutes to complete the written survey that you may receive in the mail after your visit with us. Thank you!             Your Updated Medication List - Protect others around you: Learn how to safely use, store and throw away your medicines at www.disposemymeds.org.          This list is accurate as of: 10/23/17 10:44 AM.  Always use  your most recent med list.                   Brand Name Dispense Instructions for use Diagnosis    aspirin 81 MG tablet      Take 1 tablet by mouth daily.        atorvastatin 40 MG tablet    LIPITOR    90 tablet    Take 1 tablet (40 mg) by mouth daily (Will need clinic visit and labs for more refills)    Hyperlipidemia LDL goal <70       fluticasone 50 MCG/ACT spray    FLONASE    1 Bottle    Spray 1-2 sprays into both nostrils daily    Perennial allergic rhinitis, unspecified allergic rhinitis trigger       lisinopril 5 MG tablet    PRINIVIL/ZESTRIL    90 tablet    Take 1 tablet (5 mg) by mouth daily    Hypertension goal BP (blood pressure) < 140/90       omeprazole 20 MG tablet     90 tablet    Take 1 tablet (20 mg) by mouth daily Take 30-60 minutes before a meal.    Gastroesophageal reflux disease, esophagitis presence not specified       tiZANidine 4 MG tablet    ZANAFLEX    40 tablet    Take 1 tablet (4 mg) by mouth 3 times daily as needed for muscle spasms    Muscle pain

## 2017-10-24 ENCOUNTER — TELEPHONE (OUTPATIENT)
Dept: OPHTHALMOLOGY | Facility: CLINIC | Age: 72
End: 2017-10-24

## 2017-10-24 ENCOUNTER — OFFICE VISIT (OUTPATIENT)
Dept: OPHTHALMOLOGY | Facility: CLINIC | Age: 72
End: 2017-10-24
Payer: COMMERCIAL

## 2017-10-24 DIAGNOSIS — H25.10 SENILE NUCLEAR SCLEROSIS: Primary | ICD-10-CM

## 2017-10-24 DIAGNOSIS — H35.371 EPIRETINAL MEMBRANE, RIGHT EYE: ICD-10-CM

## 2017-10-24 DIAGNOSIS — H25.12 NUCLEAR SCLEROSIS OF LEFT EYE: Primary | ICD-10-CM

## 2017-10-24 DIAGNOSIS — H40.001 GLAUCOMA SUSPECT, RIGHT: ICD-10-CM

## 2017-10-24 DIAGNOSIS — H27.01 APHAKIA, RIGHT: ICD-10-CM

## 2017-10-24 DIAGNOSIS — Z98.890 HISTORY OF VITRECTOMY: ICD-10-CM

## 2017-10-24 PROBLEM — H25.812 COMBINED FORM OF AGE-RELATED CATARACT, LEFT EYE: Status: ACTIVE | Noted: 2017-10-24

## 2017-10-24 PROCEDURE — 92136 OPHTHALMIC BIOMETRY: CPT | Mod: TC | Performed by: STUDENT IN AN ORGANIZED HEALTH CARE EDUCATION/TRAINING PROGRAM

## 2017-10-24 PROCEDURE — 92012 INTRM OPH EXAM EST PATIENT: CPT | Performed by: STUDENT IN AN ORGANIZED HEALTH CARE EDUCATION/TRAINING PROGRAM

## 2017-10-24 RX ORDER — PREDNISOLONE ACETATE 10 MG/ML
1 SUSPENSION/ DROPS OPHTHALMIC 4 TIMES DAILY
Qty: 10 ML | Refills: 0 | Status: SHIPPED | OUTPATIENT
Start: 2017-11-08 | End: 2017-12-20

## 2017-10-24 RX ORDER — MOXIFLOXACIN 5 MG/ML
1 SOLUTION/ DROPS OPHTHALMIC 4 TIMES DAILY
Qty: 1 BOTTLE | Refills: 0 | Status: SHIPPED | OUTPATIENT
Start: 2017-11-06 | End: 2018-02-05

## 2017-10-24 RX ORDER — DICLOFENAC SODIUM 1 MG/ML
1 SOLUTION/ DROPS OPHTHALMIC 4 TIMES DAILY
Qty: 5 ML | Refills: 0 | Status: SHIPPED | OUTPATIENT
Start: 2017-11-06 | End: 2017-12-20

## 2017-10-24 ASSESSMENT — VISUAL ACUITY
OS_CC: 20/50
OS_CC+: -2
OS_PH_CC: 20/30-2
OD_CC: CF@2
METHOD: SNELLEN - LINEAR
CORRECTION_TYPE: GLASSES

## 2017-10-24 ASSESSMENT — EXTERNAL EXAM - RIGHT EYE: OD_EXAM: 3+ BROW PTOSIS

## 2017-10-24 ASSESSMENT — SLIT LAMP EXAM - LIDS
COMMENTS: 1+ DERMATOCHALASIS - UPPER LID
COMMENTS: 1+ DERMATOCHALASIS - UPPER LID

## 2017-10-24 ASSESSMENT — EXTERNAL EXAM - LEFT EYE: OS_EXAM: 3+ BROW PTOSIS

## 2017-10-24 NOTE — TELEPHONE ENCOUNTER
Diclofenac on long term backorder.  Robbin at [facility] pharmacy will substitute for Ketoralac instead.

## 2017-10-24 NOTE — MR AVS SNAPSHOT
After Visit Summary   10/24/2017    Donn Allen    MRN: [id number]           Patient Information     Date Of Birth          1945        Visit Information        Provider Department      10/24/2017 9:45 AM Loyd [facility] MD [facility]        Today's Diagnoses     Combined form of age-related cataract, left eye    -  1      Care Instructions    PRE-OP CATARACT INSTRUCTIONS    ** 3 eye drops from the pharmacy at least 3 days before surgery.    *Use the following drops in the left eye 4 times on the day before surgery and once the morning of surgery:                                   Vigamox or Ofloxacin (tan cap)              Diclofenac    **We will start the prednisolone (white or pink top) drops AFTER surgery.    *Please bring all your eyedrops to the surgery center.*    *If taking more than one drop, wait five minutes between drops.    *No solid food after midnight.    *Clear liquids: coffee (no cream), tea, water, and jello are OK before 5:30 A.M.  You may take your regular pills with this.    *If you are taking glaucoma drops, continue as usual.    Loyd Garcia MD  [phone]          Follow-ups after your visit        Your next 10 appointments already scheduled     Nov 07, 2017   Procedure with Loyd Garcia MD   [facility] (--)    [address]  [facility] [address]   [phone]            Nov 08, 2017 12:45 PM CST   Return Visit with Loyd Garcia MD   [facility] ([facility])    [address] [address]   [phone]            Nov 15, 2017 12:45 PM CST   Return Visit with Loyd Garcia MD   [facility] ([facility])    [address] [address]   [phone]            Dec 07, 2017 10:30 AM CST   Return Visit with GEORGI Willett MD   [facility] AT [facility] ([facility]  [facility])    [address]  [place] [address] [phone]            Dec 13, 2017  1:15 PM CST   Return Visit with Loyd Garcia MD   [facility] ([facility])    [address]  [place] [address] [phone]              Who to contact     If you have questions or need follow up information about today's clinic visit or your schedule please contact [facility] directly at [phone].  Normal or non-critical lab and imaging results will be communicated to you by Skycurehart, letter or phone within 4 business days after the clinic has received the results. If you do not hear from us within 7 days, please contact the clinic through Skycurehart or phone. If you have a critical or abnormal lab result, we will notify you by phone as soon as possible.  Submit refill requests through Track the Bet or call your pharmacy and they will forward the refill request to us. Please allow 3 business days for your refill to be completed.          Additional Information About Your Visit        MyChart Information     Track the Bet gives you secure access to your electronic health record. If you see a primary care provider, you can also send messages to your care team and make appointments. If you have questions, please call your primary care clinic.  If you do not have a primary care provider, please call [phone] and they will assist you.        Care EveryWhere ID     This is your Care EveryWhere ID. This could be used by other organizations to access your [facility] medical records  [id number]         Blood Pressure from Last 3 Encounters:   10/23/17 127/68   03/01/17 112/64   02/17/17 129/73    Weight from Last 3 Encounters:   10/23/17 58.5 kg (129 lb)   03/01/17 63.6 kg (140 lb 3.2 oz)   02/17/17 59.4 kg (131 lb)              We Performed the Following     IOL MASTER (1st eye)     IOL MASTER (both eyes)          Today's Medication Changes          These  changes are accurate as of: 10/24/17 10:50 AM.  If you have any questions, ask your nurse or doctor.               Start taking these medicines.        Dose/Directions    diclofenac 0.1 % ophthalmic solution   Commonly known as:  VOLTAREN   Used for:  Combined form of age-related cataract, left eye   Started by:  Loyd Garcia MD        Dose:  1 drop   Start taking on:  11/6/2017   Place 1 drop Into the left eye 4 times daily   Quantity:  5 mL   Refills:  0       moxifloxacin 0.5 % ophthalmic solution   Commonly known as:  VIGAMOX   Used for:  Combined form of age-related cataract, left eye   Started by:  Loyd Garcia MD        Dose:  1 drop   Start taking on:  11/6/2017   Place 1 drop Into the left eye 4 times daily   Quantity:  1 Bottle   Refills:  0       prednisoLONE acetate 1 % ophthalmic susp   Commonly known as:  PRED FORTE   Used for:  Combined form of age-related cataract, left eye   Started by:  Loyd Garcia MD        Dose:  1 drop   Start taking on:  11/8/2017   Place 1 drop Into the left eye 4 times daily   Quantity:  10 mL   Refills:  0            Where to get your medicines      These medications were sent to [facility] [address] [place] [address] [address] [place] [address]  [address] [place] [address]     Phone:  [phone]     diclofenac 0.1 % ophthalmic solution    moxifloxacin 0.5 % ophthalmic solution    prednisoLONE acetate 1 % ophthalmic susp                Primary Care Provider Office Phone # Fax #    Ha Anderson [phone]       [address] [facility] [facility] [address]        Equal Access to Services     [facility] AH: Hadii aad ku hadasho Soomaali, waaxda luqadaha, qaybta kaalmada adeegyada, brady quiñones. So [facility] [phone].    ATENCIÓN: Si habla español, tiene a santoro disposición servicios gratuitos de asistencia lingüística. Llame al [phone].    We comply with applicable federal civil rights  laws and [place] laws. We do not discriminate on the basis of race, color, national origin, age, disability, sex, sexual orientation, or gender identity.            Thank you!     Thank you for choosing [facility]  for your care. Our goal is always to provide you with excellent care. Hearing back from our patients is one way we can continue to improve our services. Please take a few minutes to complete the written survey that you may receive in the mail after your visit with us. Thank you!             Your Updated Medication List - Protect others around you: Learn how to safely use, store and throw away your medicines at www.disposemymeds.org.          This list is accurate as of: 10/24/17 10:50 AM.  Always use your most recent med list.                   Brand Name Dispense Instructions for use Diagnosis    aspirin 81 MG tablet      Take 1 tablet by mouth daily.        atorvastatin 40 MG tablet    LIPITOR    90 tablet    Take 1 tablet (40 mg) by mouth daily (Will need clinic visit and labs for more refills)    Hyperlipidemia LDL goal <70       diclofenac 0.1 % ophthalmic solution   Start taking on:  11/6/2017    VOLTAREN    5 mL    Place 1 drop Into the left eye 4 times daily    Combined form of age-related cataract, left eye       fluticasone 50 MCG/ACT spray    FLONASE    1 Bottle    Spray 1-2 sprays into both nostrils daily    Perennial allergic rhinitis, unspecified allergic rhinitis trigger       lisinopril 5 MG tablet    PRINIVIL/ZESTRIL    90 tablet    Take 1 tablet (5 mg) by mouth daily    Hypertension goal BP (blood pressure) < 140/90       moxifloxacin 0.5 % ophthalmic solution   Start taking on:  11/6/2017    VIGAMOX    1 Bottle    Place 1 drop Into the left eye 4 times daily    Combined form of age-related cataract, left eye       omeprazole 20 MG tablet     90 tablet    Take 1 tablet (20 mg) by mouth daily Take 30-60 minutes before a meal.    Gastroesophageal reflux disease, esophagitis  presence not specified       prednisoLONE acetate 1 % ophthalmic susp   Start taking on:  11/8/2017    PRED FORTE    10 mL    Place 1 drop Into the left eye 4 times daily    Combined form of age-related cataract, left eye       tiZANidine 4 MG tablet    ZANAFLEX    40 tablet    Take 1 tablet (4 mg) by mouth 3 times daily as needed for muscle spasms    Muscle pain

## 2017-10-24 NOTE — PATIENT INSTRUCTIONS
PRE-OP CATARACT INSTRUCTIONS    ** 3 eye drops from the pharmacy at least 3 days before surgery.    *Use the following drops in the left eye 4 times on the day before surgery and once the morning of surgery:                                   Vigamox or Ofloxacin (tan cap)              Diclofenac    **We will start the prednisolone (white or pink top) drops AFTER surgery.    *Please bring all your eyedrops to the surgery center.*    *If taking more than one drop, wait five minutes between drops.    *No solid food after midnight.    *Clear liquids: coffee (no cream), tea, water, and jello are OK before 5:30 A.M.  You may take your regular pills with this.    *If you are taking glaucoma drops, continue as usual.    Loyd Garcia MD  [phone]

## 2017-10-24 NOTE — TELEPHONE ENCOUNTER
Reason for call:  Other   Patient called regarding (reason for call): Cataract surgery lens option  Additional comments:Patient had seen Dr. Garcia today, 10/24/2017, and he was given two options for the lens. The patient stated he changed his mind from what was discuss this morning. Please call him at home. Thank you.      Phone number to reach patient:  Home number on file [phone] (home)    Best Time:  any    Can we leave a detailed message on this number> \NO

## 2017-10-25 RX ORDER — KETOROLAC TROMETHAMINE 5 MG/ML
1 SOLUTION OPHTHALMIC 4 TIMES DAILY
Qty: 1 BOTTLE | Refills: 0 | Status: SHIPPED | OUTPATIENT
Start: 2017-11-06 | End: 2018-02-05

## 2017-10-25 NOTE — PROGRESS NOTES
Your pre-op labs are okay.    Ha Walton- please fax to pre-op ( I believe [facility])  Thanks  Ha Anderson MD

## 2017-10-25 NOTE — TELEPHONE ENCOUNTER
Ordered Ketoralac in place of Diclofenac for patient, Dr. Garcia to sign and call patient about his questions.

## 2017-11-08 ENCOUNTER — OFFICE VISIT (OUTPATIENT)
Dept: OPHTHALMOLOGY | Facility: CLINIC | Age: 72
End: 2017-11-08
Payer: COMMERCIAL

## 2017-11-08 DIAGNOSIS — H54.8 LEGAL BLINDNESS: ICD-10-CM

## 2017-11-08 DIAGNOSIS — H25.812 COMBINED FORM OF AGE-RELATED CATARACT, LEFT EYE: Primary | ICD-10-CM

## 2017-11-08 PROCEDURE — 92012 INTRM OPH EXAM EST PATIENT: CPT | Performed by: OPHTHALMOLOGY

## 2017-11-08 ASSESSMENT — VISUAL ACUITY
METHOD: SNELLEN - LINEAR
OS_CC: 20/30-1
CORRECTION_TYPE: GLASSES
OD_CC: 20/50

## 2017-11-08 ASSESSMENT — EXTERNAL EXAM - RIGHT EYE: OD_EXAM: 3+ BROW PTOSIS

## 2017-11-08 ASSESSMENT — REFRACTION_MANIFEST
OD_CYLINDER: +1.50
OD_SPHERE: +8.50
OS_CYLINDER: +0.50
OD_AXIS: 077
OS_SPHERE: -4.25
OS_AXIS: 015

## 2017-11-08 ASSESSMENT — EXTERNAL EXAM - LEFT EYE: OS_EXAM: 3+ BROW PTOSIS, MILD TO MOD BROW

## 2017-11-08 ASSESSMENT — SLIT LAMP EXAM - LIDS
COMMENTS: 1+ DERMATOCHALASIS - UPPER LID
COMMENTS: 1+ DERMATOCHALASIS - UPPER LID

## 2017-11-08 ASSESSMENT — CUP TO DISC RATIO: OS_RATIO: 0.4

## 2017-11-08 NOTE — PROGRESS NOTES
Current Eye Medications:  no     Subjective:  Cataract evaluation  Pts cataract surgery left eye needed to be cancelled with Dr. Garcia,  pt would like to have have  cataract surgery soon as possible. 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     Objective:  See Ophthalmology Exam.       Assessment:  Visually significant cataract left eye.       Plan: Will proceed with cataract surgery left eye next Monday per patient request.  Discussed risks of operating in patient's only functional eye, possible posterior vitreous detachment, target IOL power.     Plan Kelman phacoemulsification/ posterior chamber lens left eye local standby.  Risks, benefits, complications, and alternatives discussed with patient including possibility of limitations from coexistent eye disease and loss of vision.  Target refraction and multifocal lens options discussed.  Patient understands and consents.  Donn Price M.D.

## 2017-11-08 NOTE — MR AVS SNAPSHOT
After Visit Summary   11/8/2017    Donn Allen    MRN: [id number]           Patient Information     Date Of Birth          1945        Visit Information        Provider Department      11/8/2017 7:45 AM Donn Price MD [facility]        Care Instructions    PRE-OP CATARACT INSTRUCTIONS      *Use the following drops in the left eye 3 times a day the day before surgery and once the morning of surgery:                                    Vigamox (tan cap)    *If taking more than one drop, wait five minutes between drops.    *No solid food after midnight.    *Clear liquids: coffee (no cream), tea, water, and jello are OK before 9:15 A.M.  You may take your regular pills with this.      Donn Price M.D.              Follow-ups after your visit        Your next 10 appointments already scheduled     Nov 14, 2017  2:30 PM CST   Return Visit with Donn Price MD   [facility] ([facility])    [address] [address]   [phone]            Nov 21, 2017  2:15 PM CST   Return Visit with Donn Price MD   [facility] ([facility])    [address] [place] [address]   [phone]            Dec 07, 2017 10:30 AM CST   Return Visit with GEORGI Willett MD   [facility] ([email or web address])    [address]  [facility] [address]   [phone]            Dec 20, 2017  8:45 AM CST   Return Visit with Donn Price MD   [facility] ([facility])    [address] [address]   [phone]              Who to contact     If you have questions or need follow up information about today's clinic visit or your schedule please contact [facility] directly at [phone].  Normal or non-critical lab and imaging results will be communicated to you by MyChart, letter or  phone within 4 business days after the clinic has received the results. If you do not hear from us within 7 days, please contact the clinic through 9You or phone. If you have a critical or abnormal lab result, we will notify you by phone as soon as possible.  Submit refill requests through 9You or call your pharmacy and they will forward the refill request to us. Please allow 3 business days for your refill to be completed.          Additional Information About Your Visit        [employer] Information     9You gives you secure access to your electronic health record. If you see a primary care provider, you can also send messages to your care team and make appointments. If you have questions, please call your primary care clinic.  If you do not have a primary care provider, please call [phone] and they will assist you.        Care EveryWhere ID     This is your Care EveryWhere ID. This could be used by other organizations to access your [facility] medical records  [id number]         Blood Pressure from Last 3 Encounters:   10/23/17 127/68   03/01/17 112/64   02/17/17 129/73    Weight from Last 3 Encounters:   10/23/17 58.5 kg (129 lb)   03/01/17 63.6 kg (140 lb 3.2 oz)   02/17/17 59.4 kg (131 lb)              Today, you had the following     No orders found for display       Primary Care Provider Office Phone # Fax #    Ha Anderson [phone]       [address] [facility] [address]        Equal Access to Services     [facility] AH: Hadii evelia erazoo Solukas, waaxda luqadaha, qaybta kaalmada adeegyada, brady quiñones. So [facility] [phone].    ATENCIÓN: Si habla español, tiene a santoro disposición servicios gratuitos de asistencia lingüística. Llame al [phone].    We comply with applicable federal civil rights laws and [place] laws. We do not discriminate on the basis of race, color, national origin, age, disability, sex, sexual  orientation, or gender identity.            Thank you!     Thank you for choosing [facility]  for your care. Our goal is always to provide you with excellent care. Hearing back from our patients is one way we can continue to improve our services. Please take a few minutes to complete the written survey that you may receive in the mail after your visit with us. Thank you!             Your Updated Medication List - Protect others around you: Learn how to safely use, store and throw away your medicines at www.disposemymeds.org.          This list is accurate as of: 11/8/17  8:57 AM.  Always use your most recent med list.                   Brand Name Dispense Instructions for use Diagnosis    aspirin 81 MG tablet      Take 1 tablet by mouth daily.        atorvastatin 40 MG tablet    LIPITOR    90 tablet    Take 1 tablet (40 mg) by mouth daily (Will need clinic visit and labs for more refills)    Hyperlipidemia LDL goal <70       diclofenac 0.1 % ophthalmic solution    VOLTAREN    5 mL    Place 1 drop Into the left eye 4 times daily        fluticasone 50 MCG/ACT spray    FLONASE    1 Bottle    Spray 1-2 sprays into both nostrils daily    Perennial allergic rhinitis, unspecified allergic rhinitis trigger       ketorolac 0.5 % ophthalmic solution    ACULAR    1 Bottle    Place 1 drop Into the left eye 4 times daily    Senile nuclear sclerosis       lisinopril 5 MG tablet    PRINIVIL/ZESTRIL    90 tablet    Take 1 tablet (5 mg) by mouth daily    Hypertension goal BP (blood pressure) < 140/90       moxifloxacin 0.5 % ophthalmic solution    VIGAMOX    1 Bottle    Place 1 drop Into the left eye 4 times daily        omeprazole 20 MG tablet     90 tablet    Take 1 tablet (20 mg) by mouth daily Take 30-60 minutes before a meal.    Gastroesophageal reflux disease, esophagitis presence not specified       prednisoLONE acetate 1 % ophthalmic susp    PRED FORTE    10 mL    Place 1 drop Into the left eye 4 times daily         tiZANidine 4 MG tablet    ZANAFLEX    40 tablet    Take 1 tablet (4 mg) by mouth 3 times daily as needed for muscle spasms    Muscle pain

## 2017-11-08 NOTE — PATIENT INSTRUCTIONS
PRE-OP CATARACT INSTRUCTIONS      *Use the following drops in the left eye 3 times a day the day before surgery and once the morning of surgery:                                    Vigamox (tan cap)    *If taking more than one drop, wait five minutes between drops.    *No solid food after midnight.    *Clear liquids: coffee (no cream), tea, water, and jello are OK before 9:15 A.M.  You may take your regular pills with this.      Donn Price M.D.

## 2017-11-13 PROBLEM — H25.812 COMBINED FORM OF AGE-RELATED CATARACT, LEFT EYE: Status: RESOLVED | Noted: 2017-10-24 | Resolved: 2017-11-13

## 2017-11-13 PROBLEM — Z96.1 PSEUDOPHAKIA: Status: ACTIVE | Noted: 2017-11-13

## 2017-11-14 ENCOUNTER — OFFICE VISIT (OUTPATIENT)
Dept: OPHTHALMOLOGY | Facility: CLINIC | Age: 72
End: 2017-11-14
Payer: COMMERCIAL

## 2017-11-14 DIAGNOSIS — Z96.1 PSEUDOPHAKIA: Primary | ICD-10-CM

## 2017-11-14 PROCEDURE — 99024 POSTOP FOLLOW-UP VISIT: CPT | Performed by: OPHTHALMOLOGY

## 2017-11-14 ASSESSMENT — TONOMETRY: OS_IOP_MMHG: 13

## 2017-11-14 ASSESSMENT — VISUAL ACUITY
METHOD: SNELLEN - LINEAR
OS_PH_SC: 20/40+3
OS_SC: 20/100-1

## 2017-11-14 ASSESSMENT — SLIT LAMP EXAM - LIDS: COMMENTS: NORMAL

## 2017-11-14 NOTE — PROGRESS NOTES
Current Eye Medications:  Vigamox/diclofenac/1%prednisolone     Subjective:  po1 os   Pt.reports he thinks his eye it doing pretty good, however has a fbs.      Objective:  See Ophthalmology Exam.       Assessment: Doing well status/post Kelman phacoemulsification/ posterior chamber lens left eye .      Plan:   See Patient Instructions.

## 2017-11-14 NOTE — MR AVS SNAPSHOT
After Visit Summary   11/14/2017    Donn Allen    MRN: [id number]           Patient Information     Date Of Birth          1945        Visit Information        Provider Department      11/14/2017 2:30 PM Donn Price MD [facility]        Today's Diagnoses     Pseudophakia, os    -  1      Care Instructions    POST-OP CATARACT INSTRUCTIONS    Use the following drops in the left eye:    Ketorolac (tan cap) 3 times a day .  Prednisolone (pink or white cap) 3 times a day  Diclofenac (gray cap) 3 times a day    ~Wait at least 5 minutes between drops.    ~Wear eye shield at night for one week.    ~Do not exert yourself to the point that you are red in the face for one week.    ~If your vision worsens, eye becomes increasingly red, or becomes painful, call [phone].    ~If you are taking glaucoma medications, continue as usual.    ~OK to resume aspirin and/or other blood thinners.    Donn Price M.D.              Follow-ups after your visit        Your next 10 appointments already scheduled     Nov 21, 2017  2:15 PM CST   [facility] Visit with Donn Price MD   [facility]    [address] [place] [id number] [phone]            Dec 07, 2017 10:30 AM CST   Return Visit with GEORGI Willett MD   [facility] PHYSICIANS HEART AT [facility] ([facility])    [address] [place] [address] 2nd Floor  [facility] [address]   [phone]            Dec 20, 2017  8:45 AM CST   Return Visit with Donn Price MD   [facility] ([facility])    [address] [place] [address]   [phone]              Who to contact     If you have questions or need follow up information about today's clinic visit or your schedule please contact [facility] directly at [phone].  Normal or non-critical lab and imaging results will be communicated to you by Lionel  letter or phone within 4 business days after the clinic has received the results. If you do not hear from us within 7 days, please contact the clinic through [employer] or phone. If you have a critical or abnormal lab result, we will notify you by phone as soon as possible.  Submit refill requests through [employer] or call your pharmacy and they will forward the refill request to us. Please allow 3 business days for your refill to be completed.          Additional Information About Your Visit        [employer] Information     [employer] gives you secure access to your electronic health record. If you see a primary care provider, you can also send messages to your care team and make appointments. If you have questions, please call your primary care clinic.  If you do not have a primary care provider, please call [phone] and they will assist you.        Care EveryWhere ID     This is your Care EveryWhere ID. This could be used by other organizations to access your [place] medical records  [id number]         Blood Pressure from Last 3 Encounters:   10/23/17 127/68   03/01/17 112/64   02/17/17 129/73    Weight from Last 3 Encounters:   10/23/17 58.5 kg (129 lb)   03/01/17 63.6 kg (140 lb 3.2 oz)   02/17/17 59.4 kg (131 lb)              Today, you had the following     No orders found for display       Primary Care Provider Office Phone # Fax #    Ha Anderson [phone]       [address] [facility] [address]        Equal Access to Services     EPIFANIO SANTOS : Hadii aad ku hadasho Soomaali, waaxda luqadaha, qaybta kaalmada adeegyada, brady quiñones. So [facility] [phone].    ATENCIÓN: Si habla español, tiene a santoro disposición servicios gratuitos de asistencia lingüística. Llame al [phone].    We comply with applicable federal civil rights laws and [place] laws. We do not discriminate on the basis of race, color, national origin, age, disability, sex, sexual  orientation, or gender identity.            Thank you!     Thank you for choosing [facility]  for your care. Our goal is always to provide you with excellent care. Hearing back from our patients is one way we can continue to improve our services. Please take a few minutes to complete the written survey that you may receive in the mail after your visit with us. Thank you!             Your Updated Medication List - Protect others around you: Learn how to safely use, store and throw away your medicines at www.disposemymeds.org.          This list is accurate as of: 11/14/17  3:02 PM.  Always use your most recent med list.                   Brand Name Dispense Instructions for use Diagnosis    aspirin 81 MG tablet      Take 1 tablet by mouth daily.        atorvastatin 40 MG tablet    LIPITOR    90 tablet    Take 1 tablet (40 mg) by mouth daily (Will need clinic visit and labs for more refills)    Hyperlipidemia LDL goal <70       diclofenac 0.1 % ophthalmic solution    VOLTAREN    5 mL    Place 1 drop Into the left eye 4 times daily        fluticasone 50 MCG/ACT spray    FLONASE    1 Bottle    Spray 1-2 sprays into both nostrils daily    Perennial allergic rhinitis, unspecified allergic rhinitis trigger       ketorolac 0.5 % ophthalmic solution    ACULAR    1 Bottle    Place 1 drop Into the left eye 4 times daily    Senile nuclear sclerosis       lisinopril 5 MG tablet    PRINIVIL/ZESTRIL    90 tablet    Take 1 tablet (5 mg) by mouth daily    Hypertension goal BP (blood pressure) < 140/90       moxifloxacin 0.5 % ophthalmic solution    VIGAMOX    1 Bottle    Place 1 drop Into the left eye 4 times daily        omeprazole 20 MG tablet     90 tablet    Take 1 tablet (20 mg) by mouth daily Take 30-60 minutes before a meal.    Gastroesophageal reflux disease, esophagitis presence not specified       prednisoLONE acetate 1 % ophthalmic susp    PRED FORTE    10 mL    Place 1 drop Into the left eye 4 times daily         tiZANidine 4 MG tablet    ZANAFLEX    40 tablet    Take 1 tablet (4 mg) by mouth 3 times daily as needed for muscle spasms    Muscle pain

## 2017-11-21 ENCOUNTER — OFFICE VISIT (OUTPATIENT)
Dept: OPHTHALMOLOGY | Facility: CLINIC | Age: 72
End: 2017-11-21
Payer: COMMERCIAL

## 2017-11-21 DIAGNOSIS — Z96.1 PSEUDOPHAKIA: Primary | ICD-10-CM

## 2017-11-21 PROCEDURE — 99024 POSTOP FOLLOW-UP VISIT: CPT | Mod: GC | Performed by: OPHTHALMOLOGY

## 2017-11-21 ASSESSMENT — SLIT LAMP EXAM - LIDS: COMMENTS: NORMAL

## 2017-11-21 ASSESSMENT — REFRACTION_MANIFEST
OS_ADD: +2.50
OS_AXIS: 075
OS_CYLINDER: +0.75
OS_SPHERE: -3.25

## 2017-11-21 ASSESSMENT — VISUAL ACUITY
OD_SC+: ECC
OS_PH_SC: 20/40
OS_SC: J1+
METHOD: SNELLEN - LINEAR
OS_SC: 20/125
OD_SC: CF @ 2'

## 2017-11-21 ASSESSMENT — TONOMETRY
OS_IOP_MMHG: 12
OD_IOP_MMHG: 14
IOP_METHOD: APPLANATION

## 2017-11-21 NOTE — PATIENT INSTRUCTIONS
1) Continue Prednisolone and Diclofenac drops three times daily until gone.      Stop Vigamox    2)  Stop using shield after one week one week after surgery.    3)  Return for final exam as scheduled in about one month.    Donn Price M.D.

## 2017-11-21 NOTE — PROGRESS NOTES
Current Eye Medications:  Vigamox/diclofenac/1%prednisolone TID left eye.     Subjective:  Po2, KPE left eye 11/13/17. Vision is Blurred right eye for 20 years since eye injury. Vision is blurry left eye, maybe little improved. Colors much brighter left eye. Patient having to keep right eye closed eyes don't work togather, Causes left eye to blur seems to come and go. Zero pain, or discomfort both eyes.     Objective:  See Ophthalmology Exam.       Assessment: Doing well status/post Kelman phacoemulsification/ posterior chamber lens left eye.      Plan:   See Patient Instructions.

## 2017-11-21 NOTE — MR AVS SNAPSHOT
After Visit Summary   11/21/2017    Donn Allen    MRN: [id number]           Patient Information     Date Of Birth          1945        Visit Information        Provider Department      11/21/2017 2:15 PM Donn Price MD [facility]        Today's Diagnoses     Pseudophakia, os    -  1      Care Instructions    1) Continue Prednisolone and Diclofenac drops three times daily until gone.      Stop Vigamox    2)  Stop using shield after one week one week after surgery.    3)  Return for final exam as scheduled in about one month.    Donn Price M.D.          Follow-ups after your visit        Your next 10 appointments already scheduled     Dec 07, 2017 10:30 AM CST   Return Visit with GEORGI Willett MD   [facility] PHYSICIANS HEART AT [facility] ([facility])    [address] [place] [address] [address]nd [facility] [address]   [phone]            Dec 20, 2017  8:45 AM CST   [facility] Visit with Donn Price MD   [facility]    [address] [place] [address] [phone]              Who to contact     If you have questions or need follow up information about today's clinic visit or your schedule please contact [facility] directly at [phone].  Normal or non-critical lab and imaging results will be communicated to you by MyChart, letter or phone within 4 business days after the clinic has received the results. If you do not hear from us within 7 days, please contact the clinic through MyChart or phone. If you have a critical or abnormal lab result, we will notify you by phone as soon as possible.  Submit refill requests through [employer] or call your pharmacy and they will forward the refill request to us. Please allow 3 business days for your refill to be completed.          Additional Information About Your Visit        [employer] Information     [employer] gives you secure access to  your electronic health record. If you see a primary care provider, you can also send messages to your care team and make appointments. If you have questions, please call your primary care clinic.  If you do not have a primary care provider, please call [phone] and they will assist you.        Care EveryWhere ID     This is your Care EveryWhere ID. This could be used by other organizations to access your [place] medical records  [id number]         Blood Pressure from Last 3 Encounters:   10/23/17 127/68   03/01/17 112/64   02/17/17 129/73    Weight from Last 3 Encounters:   10/23/17 58.5 kg (129 lb)   03/01/17 63.6 kg (140 lb 3.2 oz)   02/17/17 59.4 kg (131 lb)              Today, you had the following     No orders found for display       Primary Care Provider Office Phone # Fax #    Ha Anderson [phone]       [address] [facility] [address]        Equal Access to Services     EPIFANIO SANTOS : Hadii aad ku hadasho Soomaali, waaxda luqadaha, qaybta kaalmada adeegyada, waxay idiin hayaan andrew lynn . So [facility] [phone].    ATENCIÓN: Si habla español, tiene a santoro disposición servicios gratuitos de asistencia lingüística. Ll[facility] [phone].    We comply with applicable federal civil rights laws and [place] laws. We do not discriminate on the basis of race, color, national origin, age, disability, sex, sexual orientation, or gender identity.            Thank you!     Thank you for choosing [facility]  for your care. Our goal is always to provide you with excellent care. Hearing back from our patients is one way we can continue to improve our services. Please take a few minutes to complete the written survey that you may receive in the mail after your visit with us. Thank you!             Your Updated Medication List - Protect others around you: Learn how to safely use, store and throw away your medicines at www.disposemymeds.org.          This  list is accurate as of: 11/21/17  3:15 PM.  Always use your most recent med list.                   Brand Name Dispense Instructions for use Diagnosis    aspirin 81 MG tablet      Take 1 tablet by mouth daily.        atorvastatin 40 MG tablet    LIPITOR    90 tablet    Take 1 tablet (40 mg) by mouth daily (Will need clinic visit and labs for more refills)    Hyperlipidemia LDL goal <70       diclofenac 0.1 % ophthalmic solution    VOLTAREN    5 mL    Place 1 drop Into the left eye 4 times daily        fluticasone 50 MCG/ACT spray    FLONASE    1 Bottle    Spray 1-2 sprays into both nostrils daily    Perennial allergic rhinitis, unspecified allergic rhinitis trigger       ketorolac 0.5 % ophthalmic solution    ACULAR    1 Bottle    Place 1 drop Into the left eye 4 times daily    Senile nuclear sclerosis       lisinopril 5 MG tablet    PRINIVIL/ZESTRIL    90 tablet    Take 1 tablet (5 mg) by mouth daily    Hypertension goal BP (blood pressure) < 140/90       moxifloxacin 0.5 % ophthalmic solution    VIGAMOX    1 Bottle    Place 1 drop Into the left eye 4 times daily        omeprazole 20 MG tablet     90 tablet    Take 1 tablet (20 mg) by mouth daily Take 30-60 minutes before a meal.    Gastroesophageal reflux disease, esophagitis presence not specified       prednisoLONE acetate 1 % ophthalmic susp    PRED FORTE    10 mL    Place 1 drop Into the left eye 4 times daily        tiZANidine 4 MG tablet    ZANAFLEX    40 tablet    Take 1 tablet (4 mg) by mouth 3 times daily as needed for muscle spasms    Muscle pain

## 2017-12-07 ENCOUNTER — OFFICE VISIT (OUTPATIENT)
Dept: CARDIOLOGY | Facility: CLINIC | Age: 72
End: 2017-12-07
Payer: COMMERCIAL

## 2017-12-07 VITALS — OXYGEN SATURATION: 100 % | SYSTOLIC BLOOD PRESSURE: 145 MMHG | HEART RATE: 59 BPM | DIASTOLIC BLOOD PRESSURE: 79 MMHG

## 2017-12-07 DIAGNOSIS — Z98.61 POSTSURGICAL PERCUTANEOUS TRANSLUMINAL CORONARY ANGIOPLASTY STATUS: ICD-10-CM

## 2017-12-07 DIAGNOSIS — R07.89 ATYPICAL CHEST PAIN: Primary | ICD-10-CM

## 2017-12-07 PROCEDURE — 99214 OFFICE O/P EST MOD 30 MIN: CPT | Performed by: INTERNAL MEDICINE

## 2017-12-07 ASSESSMENT — PAIN SCALES - GENERAL: PAINLEVEL: MILD PAIN (2)

## 2017-12-07 NOTE — NURSING NOTE
"Chief Complaint   Patient presents with     RECHECK     Yearly follow up with Dr. CLYDE Baires for CAD. S/p PCI of mLAD in 11/2014. Does report a new chest pain that is right above his heart that comes and goes. No sob, dizziness, abnormal fatigue, or fluttering.        Initial /79 (BP Location: Right arm, Patient Position: Chair, Cuff Size: Adult Regular)  Pulse 59  SpO2 100% Estimated body mass index is 22.85 kg/(m^2) as calculated from the following:    Height as of 3/1/17: 1.6 m (5' 3\").    Weight as of 10/23/17: 58.5 kg (129 lb)..  BP completed using cuff size: regular    Kimberly Roberson CMA    "

## 2017-12-07 NOTE — NURSING NOTE
Cardiac Testing: Patient given instructions regarding  stress echocardiogram on 12-20-17 at 10:00am. Discussed purpose, preparation, procedure and when to expect results reported back to the patient. Patient demonstrated understanding of this information and agreed to call with further questions or concerns.    Med Reconcile: Reviewed and verified all current medications with the patient. The updated medication list was printed and given to the patient.    Return Appointment: Patient given instructions regarding scheduling next clinic visit, depending on results. Patient demonstrated understanding of this information and agreed to call with further questions or concerns.    Patient stated he understood all health information given and agreed to call with further questions or concerns.    [place] RN

## 2017-12-07 NOTE — MR AVS SNAPSHOT
After Visit Summary   12/7/2017    Donn Allen    MRN: [id number]           Patient Information     Date Of Birth          1945        Visit Information        Provider Department      12/7/2017 10:30 AM GEORGI Willett MD [facility] PHYSICIANS HEART AT [facility]        Today's Diagnoses     Atypical chest pain    -  1      Care Instructions    1.  Dr. CLYDE Baires would like for you to have a stress ECHO.    This is scheduled for you on: Wednesday December 20th, 2017 @ 10:00am.  [facility] [address] [facility].    2. We will follow up with you on the results, which will determine your follow up.       [facility] - [place] Location    If you have any questions regarding to your visit please contact your care team:     Cardiology  Telephone Number   [facility]  Cardiology RN's.    Sylvie Roberson CMA [phone]    After hours: [phone].  [phone]   For scheduling appts:     [phone] or  [phone]    After hours: [phone]   For the Device Clinic (Pacemakers and ICD's)  RN's :  Beatris Santiago   During business hours: [phone]  After business hours:  [phone]- select option 4.      If you need a medication refill please contact your pharmacy.  Please allow 3 business days for your refill to be completed.    As always, Thank you for trusting us with your health care needs!  _____________________________________________________________________              Follow-ups after your visit        Your next 10 appointments already scheduled     Dec 20, 2017  8:45 AM CST   Return Visit with Donn Price MD   [facility] ([facility])    [address] [facility] [address]   [phone]            Dec 20, 2017 10:00 AM CST   Ech Stress Test with FKECHR1, FK STRESS RM   [facility] ([facility])    [address] [place] [address]  [place] [address]   [phone]           1. Please bring or wear a comfortable two-piece outfit and walking shoes. 2. Stop eating 3 hours before the test. You may drink water or juice. 3. Stop all caffeine 12 hours before the test. This includes coffee, tea, soda pop, chocolate and certain medicines (such as Anacin and Excederin). Also avoid decaf coffee and tea, as these contain small amounts of caffeine. 4. No alcohol, smoking or use of other tobacco products for 12 hours before the test. 5. Refer to your provider instructions to see if you need to stop any medications (such as beta-blockers or nitrates) for this test. 6. For patients with diabetes: - If you take insulin, call your diabetes care team. Ask if you should take a   dose the morning of your test. - If you take diabetes medicine by mouth, dont take it on the morning of your test. Bring it with you to take after the test. (If you have questions, call your diabetes care team) 7. When you arrive, please tell us if: - You have diabetes. - You have taken Viagra, Cialis or Levitra in the past 48 hours. 8. For any questions that cannot be answered, please contact the ordering physician              Future tests that were ordered for you today     Open Future Orders        Priority Expected Expires Ordered    Exercise Stress Echocardiogram Routine  12/7/2018 12/7/2017            Who to contact     If you have questions or need follow up information about today's clinic visit or your schedule please contact [facility] PHYSICIANS HEART AT [facility] directly at [phone].  Normal or non-critical lab and imaging results will be communicated to you by MyChart, letter or phone within 4 business days after the clinic has received the results. If you do not hear from us within 7 days, please contact the clinic through MyChart or phone. If you have a critical or abnormal lab result, we will notify you by phone as soon as  possible.  Submit refill requests through [employer] or call your pharmacy and they will forward the refill request to us. Please allow 3 business days for your refill to be completed.          Additional Information About Your Visit        [employer] Information     [employer] gives you secure access to your electronic health record. If you see a primary care provider, you can also send messages to your care team and make appointments. If you have questions, please call your primary care clinic.  If you do not have a primary care provider, please call [phone] and they will assist you.        Care EveryWhere ID     This is your Care EveryWhere ID. This could be used by other organizations to access your [place] medical records  [id number]        Your Vitals Were     Pulse Pulse Oximetry                59 100%           Blood Pressure from Last 3 Encounters:   12/07/17 145/79   10/23/17 127/68   03/01/17 112/64    Weight from Last 3 Encounters:   10/23/17 58.5 kg (129 lb)   03/01/17 63.6 kg (140 lb 3.2 oz)   02/17/17 59.4 kg (131 lb)               Primary Care Provider Office Phone # Fax #    Ha Anderson [phone]       [address] [facility] [address]        Equal Access to Services     EPIFANIO SANTOS : Hadii evelia ku hadasho Soomaali, waaxda luqadaha, qaybta kaalmada adeegyada, brady minn andrew quiñones. So [facility] [phone].    ATENCIÓN: Si habla español, tiene a santoro disposición servicios gratuitos de asistencia lingüística. Llame al [phone].    We comply with applicable federal civil rights laws and [place] laws. We do not discriminate on the basis of race, color, national origin, age, disability, sex, sexual orientation, or gender identity.            Thank you!     Thank you for choosing [facility] PHYSICIANS HEART AT [facility]  for your care. Our goal is always to provide you with excellent care. Hearing back from our patients is one way  we can continue to improve our services. Please take a few minutes to complete the written survey that you may receive in the mail after your visit with us. Thank you!             Your Updated Medication List - Protect others around you: Learn how to safely use, store and throw away your medicines at www.disposemymeds.org.          This list is accurate as of: 12/7/17 10:58 AM.  Always use your most recent med list.                   Brand Name Dispense Instructions for use Diagnosis    aspirin 81 MG tablet      Take 1 tablet by mouth daily.        atorvastatin 40 MG tablet    LIPITOR    90 tablet    Take 1 tablet (40 mg) by mouth daily (Will need clinic visit and labs for more refills)    Hyperlipidemia LDL goal <70       diclofenac 0.1 % ophthalmic solution    VOLTAREN    5 mL    Place 1 drop Into the left eye 4 times daily        fluticasone 50 MCG/ACT spray    FLONASE    1 Bottle    Spray 1-2 sprays into both nostrils daily    Perennial allergic rhinitis, unspecified allergic rhinitis trigger       ketorolac 0.5 % ophthalmic solution    ACULAR    1 Bottle    Place 1 drop Into the left eye 4 times daily    Senile nuclear sclerosis       lisinopril 5 MG tablet    PRINIVIL/ZESTRIL    90 tablet    Take 1 tablet (5 mg) by mouth daily    Hypertension goal BP (blood pressure) < 140/90       moxifloxacin 0.5 % ophthalmic solution    VIGAMOX    1 Bottle    Place 1 drop Into the left eye 4 times daily        omeprazole 20 MG tablet     90 tablet    Take 1 tablet (20 mg) by mouth daily Take 30-60 minutes before a meal.    Gastroesophageal reflux disease, esophagitis presence not specified       prednisoLONE acetate 1 % ophthalmic susp    PRED FORTE    10 mL    Place 1 drop Into the left eye 4 times daily        tiZANidine 4 MG tablet    ZANAFLEX    40 tablet    Take 1 tablet (4 mg) by mouth 3 times daily as needed for muscle spasms    Muscle pain

## 2017-12-07 NOTE — LETTER
12/7/2017      RE: Donn Allen  [address] [facility] [address]       Dear Colleague,    Thank you for the opportunity to participate in the care of your patient, Donn Allen, at the [facility]. Please see a copy of my visit note below.       SUBJECTIVE:    Donn Allen is a 71 year old male with history of CAD status post PCI of mLAD in 11/2014, after an abnormal stress echo that showed anterior ischemia and moderate mRCA lesion (~50%), who presents for follow up.    Had no symptoms at the time of prior stent.  Now has intermittent CP. No sure exertional. Last few seconds.    Building a log cabin at [place] and renovating his home without symptoms.    Patient Active Problem List    Diagnosis Date Noted     Pseudophakia, os 11/13/2017     Priority: Medium     Hypertension goal BP (blood pressure) < 140/90 01/05/2017     Priority: Medium     Gastroesophageal reflux disease, esophagitis presence not specified 04/26/2016     Priority: Medium     Lightheadedness 03/11/2015     Priority: Medium     Legal blindness, od 01/31/2015     Priority: Medium     Glaucoma suspect 01/31/2015     Priority: Medium     Target IOP:   Max IOP : 16/16  Family history: No  Trauma history: No  OCT: 12/2016 thin S&I right eye, within normal limits left eye   Hurt visual field (HVF): 12/2016   Right eye - Reliable, sup>inf arcuate defect (similar to 2006)   Left eye - Reliable, Normal  Pachymetry: Average 558/559  C/D: 0.9/0.4  SLT:            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  He was accompanied by Carolyn, his wife, who is the health care agent.  Previous ACP discussions reviewed and questions answered. At this time he came in with a completed directive that we reviewed as an up to date and legal document. Confirmed/documented designated decision maker(s). See permanent comments of demographics in clinical tab. Confirmed current code status reflects current choices. View document(s) and details by clicking on code status.    Added by Meme Cota on 4/4/2014            Patient states has Advance Directive and will bring in a copy to clinic. 1/11/2012   Padmini Reynaga CMA        .  Current Outpatient Prescriptions   Medication Sig     fluticasone (FLONASE) 50 MCG/ACT spray Spray 1-2 sprays into both nostrils daily     omeprazole 20 MG tablet Take 1 tablet (20 mg) by mouth daily Take 30-60 minutes before a meal.     atorvastatin (LIPITOR) 40 MG tablet Take 1 tablet (40 mg) by mouth daily (Will need clinic visit and labs for more refills)     lisinopril (PRINIVIL/ZESTRIL) 5 MG tablet Take 1 tablet (5 mg) by mouth daily     aspirin 81 MG tablet Take 1 tablet by mouth daily.     ketorolac (ACULAR) 0.5 % ophthalmic solution Place 1 drop Into the left eye 4 times daily     prednisoLONE acetate (PRED FORTE) 1 % ophthalmic susp Place 1 drop Into the left eye 4 times daily     diclofenac (VOLTAREN) 0.1 % ophthalmic solution Place 1 drop Into the left eye 4 times daily     moxifloxacin (VIGAMOX) 0.5 % ophthalmic solution Place 1 drop Into the left eye 4 times daily     tiZANidine (ZANAFLEX) 4 MG tablet Take 1 tablet (4 mg) by mouth 3 times daily as needed for muscle spasms (Patient not taking: Reported on 12/7/2017)     No current facility-administered medications for this  visit.      Past Medical History:   Diagnosis Date     Aphakia      Arthritis      CAD S/P percutaneous coronary angioplasty 11/4/2014     Combined form of age-related cataract, left eye 10/24/2017     Coronary artery disease      Environmental allergies      Hypertension goal BP (blood pressure) < 140/90 1/5/2017     Lightheadedness 3/11/2015     Retinal detachment 1990s    right eye     Strabismus      Past Surgical History:   Procedure Laterality Date     C FOOT/TOES SURGERY PROC UNLISTED Right 1982    trauma     C STOMACH SURGERY PROCEDURE UNLISTED  1963    Partial gastrectomy-h/o ulcers     CARDIAC CATHERIZATION  Nov 2014    2 stents      CATARACT IOL, RT/LT       COLONOSCOPY  3/18/2014    Procedure: COLONOSCOPY;  COLONOSCOPY SCREEN/ EGD UPPER GI ENDOSCOPY/ LOSS OF WEIGHT/ DEAL;  Surgeon: Nick Underwood MD;  Location: [facility] OR     DESTRUC RETINAL LESN,CRYOTHERAPY Right 1994     ESOPHAGOSCOPY, GASTROSCOPY, DUODENOSCOPY (EGD), COMBINED  3/18/2014    Procedure: COMBINED ESOPHAGOSCOPY, GASTROSCOPY, DUODENOSCOPY (EGD), BIOPSY SINGLE OR MULTIPLE;;  Surgeon: Nick Underwood MD;  Location: [facility] OR     PHACOEMULSIFICATION WITH STANDARD INTRAOCULAR LENS IMPLANT  11/2017    left eye     RETINAL REATTACHMENT       ROTATOR CUFF REPAIR RT/LT Bilateral 1985     VASECTOMY       Allergies   Allergen Reactions     Ibuprofen Sodium      ibu     Tetanus Toxoid      Social History     Social History     Marital status:      Spouse name: Carolyn     Number of children: 4     Years of education: 14+     Occupational History     Builder Retired     Semi-retired     Social History Main Topics     Smoking status: Former Smoker     Types: Cigarettes     Quit date: 1/11/1972     Smokeless tobacco: Never Used      Comment: non-smoking household     Alcohol use Yes      Comment: Whiskey-1 shots daily      Drug use: No     Sexual activity: Yes     Partners: Female     Other Topics Concern     Not on file     Social History  Narrative     Family History   Problem Relation Age of Onset     CANCER Father 73     lymphoma     Cataracts Father      CANCER Brother 73     pancreatic cancer 1/2 brother     CANCER Brother 83     stomach? 1/2 brother     DIABETES Maternal Aunt      CEREBROVASCULAR DISEASE No family hx of      C.A.D. No family hx of      Alzheimer Disease No family hx of      Neurologic Disorder No family hx of           REVIEW OF SYSTEMS:  General: negative, fever, chills, night sweats  Skin: negative, acne, rash and scaling  Eyes: negative, double vision, eye pain and photophobia  Ears/Nose/Throat: negative, nasal congestion and purulent rhinorrhea  Respiratory: No dyspnea on exertion, No cough, No hemoptysis and negative  Cardiovascular: negative, palpitations, tachycardia, irregular heart beat, exertional chest pain or pressure, paroxysmal nocturnal dyspnea, dyspnea on exertion and orthopnea         OBJECTIVE:  Blood pressure 145/79, pulse 59, SpO2 100 %.  General Appearance: healthy, alert, active and no distress  Head: Normocephalic. No masses, lesions, tenderness or abnormalities  Eyes: conjuctiva clear, PERRL, EOM intact  Ears: External ears normal. Canals clear. TM's normal.  Nose: Nares normal  Mouth: normal  Neck: Supple, no cervical adenopathy, no thyromegaly  Lungs: clear to auscultation  Cardiac: regular rate and rhythm, normal S1 and S2, no murmur       ASSESSMENT/PLAN:  S/P mLAD stent in 2014. Known 50% RCA disease at that time.  Lipids good with HDL 93.  HTN well controlled.  Will continue current meds.  Will evaluate atypical CP with a stress echo.  Now atypical CP as above.  Per orders.   Return to Clinic 1yr.    Please do not hesitate to contact me if you have any questions/concerns.     Sincerely,     GEORGI Willett MD

## 2017-12-07 NOTE — PATIENT INSTRUCTIONS
1.  Dr. CLYDE Baires would like for you to have a stress ECHO.    This is scheduled for you on: Wednesday December 20th, 2017 @ 10:00am.  [address]    2. We will follow up with you on the results, which will determine your follow up.       [facility] - [place] Location    If you have any questions regarding to your visit please contact your care team:     Cardiology  Telephone Number   [facility]  Cardiology RN's.    Sylvie Roberson CMA [phone]    After hours: [phone].  [phone]   For scheduling appts:     [phone] or  [phone]    After hours: [phone]   For the Device Clinic (Pacemakers and ICD's)  RN's :  Beatris Santiago   During business hours: [phone]  After business hours:  [phone]- select option 4.      If you need a medication refill please contact your pharmacy.  Please allow 3 business days for your refill to be completed.    As always, Thank you for trusting us with your health care needs!  _____________________________________________________________________

## 2017-12-07 NOTE — PROGRESS NOTES
SUBJECTIVE:    Donn Allen is a 71 year old male with history of CAD status post PCI of mLAD in 11/2014, after an abnormal stress echo that showed anterior ischemia and moderate mRCA lesion (~50%), who presents for follow up.    Had no symptoms at the time of prior stent.  Now has intermittent CP. No sure exertional. Last few seconds.    Building a log cabin at [place] and renovating his home without symptoms.    Patient Active Problem List    Diagnosis Date Noted     Pseudophakia, os 11/13/2017     Priority: Medium     Hypertension goal BP (blood pressure) < 140/90 01/05/2017     Priority: Medium     Gastroesophageal reflux disease, esophagitis presence not specified 04/26/2016     Priority: Medium     Lightheadedness 03/11/2015     Priority: Medium     Legal blindness, od 01/31/2015     Priority: Medium     Glaucoma suspect 01/31/2015     Priority: Medium     Target IOP:   Max IOP : 16/16  Family history: No  Trauma history: No  OCT: 12/2016 thin S&I right eye, within normal limits left eye   Hurt visual field (HVF): 12/2016   Right eye - Reliable, sup>inf arcuate defect (similar to 2006)   Left eye - Reliable, Normal  Pachymetry: Average 558/559  C/D: 0.9/0.4  SLT:            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  He was accompanied by Carolyn, his wife, who is the health care agent.  Previous ACP discussions reviewed and questions answered. At this time  he came in with a completed directive that we reviewed as an up to date and legal document. Confirmed/documented designated decision maker(s). See permanent comments of demographics in clinical tab. Confirmed current code status reflects current choices. View document(s) and details by clicking on code status.    Added by Meme Cota on 4/4/2014            Patient states has Advance Directive and will bring in a copy to clinic. 1/11/2012   Padmini Reynaga CMA        .  Current Outpatient Prescriptions   Medication Sig     fluticasone (FLONASE) 50 MCG/ACT spray Spray 1-2 sprays into both nostrils daily     omeprazole 20 MG tablet Take 1 tablet (20 mg) by mouth daily Take 30-60 minutes before a meal.     atorvastatin (LIPITOR) 40 MG tablet Take 1 tablet (40 mg) by mouth daily (Will need clinic visit and labs for more refills)     lisinopril (PRINIVIL/ZESTRIL) 5 MG tablet Take 1 tablet (5 mg) by mouth daily     aspirin 81 MG tablet Take 1 tablet by mouth daily.     ketorolac (ACULAR) 0.5 % ophthalmic solution Place 1 drop Into the left eye 4 times daily     prednisoLONE acetate (PRED FORTE) 1 % ophthalmic susp Place 1 drop Into the left eye 4 times daily     diclofenac (VOLTAREN) 0.1 % ophthalmic solution Place 1 drop Into the left eye 4 times daily     moxifloxacin (VIGAMOX) 0.5 % ophthalmic solution Place 1 drop Into the left eye 4 times daily     tiZANidine (ZANAFLEX) 4 MG tablet Take 1 tablet (4 mg) by mouth 3 times daily as needed for muscle spasms (Patient not taking: Reported on 12/7/2017)     No current facility-administered medications for this visit.      Past Medical History:   Diagnosis Date     Aphakia      Arthritis      CAD S/P percutaneous coronary angioplasty 11/4/2014     Combined form of age-related cataract, left eye 10/24/2017     Coronary artery disease      Environmental allergies      Hypertension goal BP (blood pressure) < 140/90 1/5/2017     Lightheadedness 3/11/2015     Retinal  detachment 1990s    right eye     Strabismus      Past Surgical History:   Procedure Laterality Date     C FOOT/TOES SURGERY PROC UNLISTED Right 1982    trauma     C STOMACH SURGERY PROCEDURE UNLISTED  1963    Partial gastrectomy-h/o ulcers     CARDIAC CATHERIZATION  Nov 2014    2 stents      CATARACT IOL, RT/LT       COLONOSCOPY  3/18/2014    Procedure: COLONOSCOPY;  COLONOSCOPY SCREEN/ EGD UPPER GI ENDOSCOPY/ LOSS OF WEIGHT/ DEAL;  Surgeon: Nick Underwood MD;  Location: [facility]     DESTRUC RETINAL LESN,CRYOTHERAPY Right 1994     ESOPHAGOSCOPY, GASTROSCOPY, DUODENOSCOPY (EGD), COMBINED  3/18/2014    Procedure: COMBINED ESOPHAGOSCOPY, GASTROSCOPY, DUODENOSCOPY (EGD), BIOPSY SINGLE OR MULTIPLE;;  Surgeon: Nick Underwood MD;  Location: [facility]     PHACOEMULSIFICATION WITH STANDARD INTRAOCULAR LENS IMPLANT  11/2017    left eye     RETINAL REATTACHMENT       ROTATOR CUFF REPAIR RT/LT Bilateral 1985     VASECTOMY       Allergies   Allergen Reactions     Ibuprofen Sodium      ibu     Tetanus Toxoid      Social History     Social History     Marital status:      Spouse name: Carolyn     Number of children: 4     Years of education: 14+     Occupational History     Builder Retired     Semi-retired     Social History Main Topics     Smoking status: Former Smoker     Types: Cigarettes     Quit date: 1/11/1972     Smokeless tobacco: Never Used      Comment: non-smoking household     Alcohol use Yes      Comment: Whiskey-1 shots daily      Drug use: No     Sexual activity: Yes     Partners: Female     Other Topics Concern     Not on file     Social History Narrative     Family History   Problem Relation Age of Onset     CANCER Father 73     lymphoma     Cataracts Father      CANCER Brother 73     pancreatic cancer 1/2 brother     CANCER Brother 83     stomach? 1/2 brother     DIABETES Maternal Aunt      CEREBROVASCULAR DISEASE No family hx of      C.A.D. No family hx of      Alzheimer Disease No family hx  of      Neurologic Disorder No family hx of           REVIEW OF SYSTEMS:  General: negative, fever, chills, night sweats  Skin: negative, acne, rash and scaling  Eyes: negative, double vision, eye pain and photophobia  Ears/Nose/Throat: negative, nasal congestion and purulent rhinorrhea  Respiratory: No dyspnea on exertion, No cough, No hemoptysis and negative  Cardiovascular: negative, palpitations, tachycardia, irregular heart beat, exertional chest pain or pressure, paroxysmal nocturnal dyspnea, dyspnea on exertion and orthopnea         OBJECTIVE:  Blood pressure 145/79, pulse 59, SpO2 100 %.  General Appearance: healthy, alert, active and no distress  Head: Normocephalic. No masses, lesions, tenderness or abnormalities  Eyes: conjuctiva clear, PERRL, EOM intact  Ears: External ears normal. Canals clear. TM's normal.  Nose: Nares normal  Mouth: normal  Neck: Supple, no cervical adenopathy, no thyromegaly  Lungs: clear to auscultation  Cardiac: regular rate and rhythm, normal S1 and S2, no murmur       ASSESSMENT/PLAN:  S/P mLAD stent in 2014. Known 50% RCA disease at that time.  Lipids good with HDL 93.  HTN well controlled.  Will continue current meds.  Will evaluate atypical CP with a stress echo.  Now atypical CP as above.  Per orders.   Return to Clinic 1yr.

## 2017-12-20 ENCOUNTER — OFFICE VISIT (OUTPATIENT)
Dept: OPHTHALMOLOGY | Facility: CLINIC | Age: 72
End: 2017-12-20
Payer: COMMERCIAL

## 2017-12-20 ENCOUNTER — RADIANT APPOINTMENT (OUTPATIENT)
Dept: CARDIOLOGY | Facility: CLINIC | Age: 72
End: 2017-12-20
Attending: INTERNAL MEDICINE
Payer: COMMERCIAL

## 2017-12-20 DIAGNOSIS — Z96.1 PSEUDOPHAKIA: Primary | ICD-10-CM

## 2017-12-20 DIAGNOSIS — R07.89 ATYPICAL CHEST PAIN: ICD-10-CM

## 2017-12-20 PROCEDURE — 40000264 ECHO STRESS WITH OPTISON: Performed by: INTERNAL MEDICINE

## 2017-12-20 PROCEDURE — 99024 POSTOP FOLLOW-UP VISIT: CPT | Performed by: OPHTHALMOLOGY

## 2017-12-20 PROCEDURE — 93321 DOPPLER ECHO F-UP/LMTD STD: CPT | Mod: 26 | Performed by: INTERNAL MEDICINE

## 2017-12-20 PROCEDURE — 93017 CV STRESS TEST TRACING ONLY: CPT | Performed by: INTERNAL MEDICINE

## 2017-12-20 PROCEDURE — 93325 DOPPLER ECHO COLOR FLOW MAPG: CPT | Mod: 26 | Performed by: INTERNAL MEDICINE

## 2017-12-20 PROCEDURE — 93352 ADMIN ECG CONTRAST AGENT: CPT | Performed by: INTERNAL MEDICINE

## 2017-12-20 PROCEDURE — 93350 STRESS TTE ONLY: CPT | Mod: 26 | Performed by: INTERNAL MEDICINE

## 2017-12-20 PROCEDURE — 93018 CV STRESS TEST I&R ONLY: CPT | Performed by: INTERNAL MEDICINE

## 2017-12-20 PROCEDURE — 93350 STRESS TTE ONLY: CPT | Mod: TC | Performed by: INTERNAL MEDICINE

## 2017-12-20 PROCEDURE — 93016 CV STRESS TEST SUPVJ ONLY: CPT | Performed by: INTERNAL MEDICINE

## 2017-12-20 PROCEDURE — 93321 DOPPLER ECHO F-UP/LMTD STD: CPT | Mod: TC | Performed by: INTERNAL MEDICINE

## 2017-12-20 PROCEDURE — 93325 DOPPLER ECHO COLOR FLOW MAPG: CPT | Mod: TC | Performed by: INTERNAL MEDICINE

## 2017-12-20 RX ADMIN — Medication 3 ML: at 10:00

## 2017-12-20 ASSESSMENT — SLIT LAMP EXAM - LIDS
COMMENTS: 1+ DERMATOCHALASIS - UPPER LID
COMMENTS: 1+ DERMATOCHALASIS - UPPER LID

## 2017-12-20 ASSESSMENT — TONOMETRY
OS_IOP_MMHG: 14
OD_IOP_MMHG: 15
IOP_METHOD: TONOPEN

## 2017-12-20 ASSESSMENT — EXTERNAL EXAM - LEFT EYE: OS_EXAM: 3+ BROW PTOSIS

## 2017-12-20 ASSESSMENT — EXTERNAL EXAM - RIGHT EYE: OD_EXAM: 3+ BROW PTOSIS

## 2017-12-20 ASSESSMENT — REFRACTION_MANIFEST
OD_ADD: +3.00
OD_SPHERE: BALANCE
OS_ADD: +3.00
OS_CYLINDER: +0.75
OS_AXIS: 043
OS_SPHERE: -2.25

## 2017-12-20 ASSESSMENT — VISUAL ACUITY
OD_SC: CF
OS_SC: 20/100
METHOD: SNELLEN - LINEAR

## 2017-12-20 ASSESSMENT — CUP TO DISC RATIO: OS_RATIO: 0.4

## 2017-12-20 NOTE — MR AVS SNAPSHOT
After Visit Summary   12/20/2017    Donn Allen    MRN: [id number]           Patient Information     Date Of Birth          1945        Visit Information        Provider Department      12/20/2017 8:45 AM Donn Price MD [facility]        Today's Diagnoses     Pseudophakia, os    -  1      Care Instructions    1)  Discontinue all drops, unless used prior to cataract surgery.    2)   Fill Rx for new glasses or drugstore readers.    3)   Return to clinic in three months for a pressure check or earlier if problems should arise.    Donn Price M.D.               Follow-ups after your visit        Your next 10 appointments already scheduled     Dec 20, 2017 10:00 AM CST   Ech Stress Test with FKECHR1, FK STRESS RM   [facility] ([facility])    [address] [place] [id number] [phone]           1. Please bring or wear a comfortable two-piece outfit and walking shoes. 2. Stop eating 3 hours before the test. You may drink water or juice. 3. Stop all caffeine 12 hours before the test. This includes coffee, tea, soda pop, chocolate and certain medicines (such as Anacin and Excederin). Also avoid decaf coffee and tea, as these contain small amounts of caffeine. 4. No alcohol, smoking or use of other tobacco products for 12 hours before the test. 5. Refer to your provider instructions to see if you need to stop any medications (such as beta-blockers or nitrates) for this test. 6. For patients with diabetes: - If you take insulin, call your diabetes care team. Ask if you should take a   dose the morning of your test. - If you take diabetes medicine by mouth, dont take it on the morning of your test. Bring it with you to take after the test. (If you have questions, call your diabetes care team) 7. When you arrive, please tell us if: - You have diabetes. - You have taken Viagra, Cialis or Levitra in the past 48  hours. 8. For any questions that cannot be answered, please contact the ordering physician              Who to contact     If you have questions or need follow up information about today's clinic visit or your schedule please contact [facility] directly at [phone].  Normal or non-critical lab and imaging results will be communicated to you by Digicompanionhart, letter or phone within 4 business days after the clinic has received the results. If you do not hear from us within 7 days, please contact the clinic through Digicompanionhart or phone. If you have a critical or abnormal lab result, we will notify you by phone as soon as possible.  Submit refill requests through Accupal or call your pharmacy and they will forward the refill request to us. Please allow 3 business days for your refill to be completed.          Additional Information About Your Visit        Digicompanionhart Information     Accupal gives you secure access to your electronic health record. If you see a primary care provider, you can also send messages to your care team and make appointments. If you have questions, please call your primary care clinic.  If you do not have a primary care provider, please call [phone] and they will assist you.        Care EveryWhere ID     This is your Care EveryWhere ID. This could be used by other organizations to access your [facility] medical records  [id number]         Blood Pressure from Last 3 Encounters:   12/07/17 145/79   10/23/17 127/68   03/01/17 112/64    Weight from Last 3 Encounters:   10/23/17 58.5 kg (129 lb)   03/01/17 63.6 kg (140 lb 3.2 oz)   02/17/17 59.4 kg (131 lb)              Today, you had the following     No orders found for display         Today's Medication Changes          These changes are accurate as of: 12/20/17  9:28 AM.  If you have any questions, ask your nurse or doctor.               Stop taking these medicines if you haven't already. Please contact your care team if you have  questions.     diclofenac 0.1 % ophthalmic solution   Commonly known as:  VOLTAREN   Stopped by:  Donn Price MD           omeprazole 20 MG tablet   Stopped by:  Donn Price MD           prednisoLONE acetate 1 % ophthalmic susp   Commonly known as:  PRED FORTE   Stopped by:  Donn Price MD                    Primary Care Provider Office Phone # Fax #    Ha CHASE MD Justin [phone]       [address] [id number]        Equal Access to Services     [facility]: Hadii aad ku hadasho Soomaali, waaxda luqadaha, qaybta kaalmada adeegyada, waxay idiin hayaan adeeg kharash lajason . So [facility] [phone].    ATENCIÓN: Si habla español, tiene a santoro disposición servicios gratuitos de asistencia lingüística. [facility] [phone].    We comply with applicable federal civil rights laws and [place] laws. We do not discriminate on the basis of race, color, national origin, age, disability, sex, sexual orientation, or gender identity.            Thank you!     Thank you for choosing [facility]  for your care. Our goal is always to provide you with excellent care. Hearing back from our patients is one way we can continue to improve our services. Please take a few minutes to complete the written survey that you may receive in the mail after your visit with us. Thank you!             Your Updated Medication List - Protect others around you: Learn how to safely use, store and throw away your medicines at www.disposemymeds.org.          This list is accurate as of: 12/20/17  9:28 AM.  Always use your most recent med list.                   Brand Name Dispense Instructions for use Diagnosis    aspirin 81 MG tablet      Take 1 tablet by mouth daily.        atorvastatin 40 MG tablet    LIPITOR    90 tablet    Take 1 tablet (40 mg) by mouth daily (Will need clinic visit and labs for more refills)    Hyperlipidemia LDL goal <70       fluticasone 50 MCG/ACT spray     FLONASE    1 Bottle    Spray 1-2 sprays into both nostrils daily    Perennial allergic rhinitis, unspecified allergic rhinitis trigger       ketorolac 0.5 % ophthalmic solution    ACULAR    1 Bottle    Place 1 drop Into the left eye 4 times daily    Senile nuclear sclerosis       lisinopril 5 MG tablet    PRINIVIL/ZESTRIL    90 tablet    Take 1 tablet (5 mg) by mouth daily    Hypertension goal BP (blood pressure) < 140/90       moxifloxacin 0.5 % ophthalmic solution    VIGAMOX    1 Bottle    Place 1 drop Into the left eye 4 times daily        tiZANidine 4 MG tablet    ZANAFLEX    40 tablet    Take 1 tablet (4 mg) by mouth 3 times daily as needed for muscle spasms    Muscle pain

## 2017-12-20 NOTE — PATIENT INSTRUCTIONS
1)  Discontinue all drops, unless used prior to cataract surgery.    2)   Fill Rx for new glasses or drugstore readers.    3)   Return to clinic in three months for a pressure check or earlier if problems should arise.    Donn Price M.D.

## 2017-12-20 NOTE — PROGRESS NOTES
Current Eye Medications:  Prednisolone tid and diclofenac tid left      Subjective:  Final mr left eye  Pt reports he is seeing good,  However states he wished he would have opted for distance vision sc, as opposed to being able to read sc.      Objective:  See Ophthalmology Exam.       Assessment: Doing well status/post Kelman phacoemulsification/ posterior chamber lens left eye.      Plan:  Reassured regarding choice of lens target.  Contact lens for right eye is an option.    See Patient Instructions.

## 2017-12-20 NOTE — NURSING NOTE
Bicycle Stress Echocardiogram with Optison performed.  IV started in LAC.    Optison:   3ml Optison mixed with 6ml Saline - [id number]  Amount used: 3.0ml, 6.0ml wasted

## 2018-01-04 ENCOUNTER — TELEPHONE (OUTPATIENT)
Dept: CARDIOLOGY | Facility: CLINIC | Age: 73
End: 2018-01-04

## 2018-01-04 NOTE — TELEPHONE ENCOUNTER
Reviewed normal Stress echo results with patient.  Patient states that his chest discomfort is occurring more frequently now but only in the morning.  It only lasts a minute approximately then resolves on its own.  This was discussed with Dr. Baires who verbalized that he does not believe it is cardiac related and to follow up with primary care provider.  Writer left a detailed message for patient with MD's response and to call clinic if any questions.    Susu Hernandez RN

## 2018-01-26 DIAGNOSIS — I10 HYPERTENSION GOAL BP (BLOOD PRESSURE) < 140/90: ICD-10-CM

## 2018-01-26 NOTE — TELEPHONE ENCOUNTER
Requested Prescriptions   Pending Prescriptions Disp Refills     lisinopril (PRINIVIL/ZESTRIL) 5 MG tablet 90 tablet 3     Sig: Take 1 tablet (5 mg) by mouth daily    There is no refill protocol information for this order   Last Written Prescription Date:  1-5-17  Last Fill Quantity: 90,  # refills: 3   Last Office Visit with [facility]:  10-23-17   Future Office Visit:    Next 5 appointments (look out 90 days)     Mar 21, 2018 10:15 AM CDT   Return Visit with Donn Price MD   [facility] ([facility])    [address]  [place] [address]   [phone]

## 2018-01-30 DIAGNOSIS — E78.5 HYPERLIPIDEMIA LDL GOAL <70: ICD-10-CM

## 2018-01-30 RX ORDER — LISINOPRIL 5 MG/1
5 TABLET ORAL DAILY
Qty: 90 TABLET | Refills: 0 | Status: SHIPPED | OUTPATIENT
Start: 2018-01-30 | End: 2018-02-05

## 2018-01-30 RX ORDER — ATORVASTATIN CALCIUM 40 MG/1
40 TABLET, FILM COATED ORAL DAILY
Qty: 90 TABLET | Refills: 0 | Status: SHIPPED | OUTPATIENT
Start: 2018-01-30 | End: 2018-02-05

## 2018-01-30 NOTE — TELEPHONE ENCOUNTER
Requested Prescriptions   Pending Prescriptions Disp Refills     atorvastatin (LIPITOR) 40 MG tablet 90 tablet 3     Sig: Take 1 tablet (40 mg) by mouth daily    There is no refill protocol information for this order        Last Written Prescription Date:  1/5/2017  Last Fill Quantity: 90,  # refills: 3   Last Office Visit with [facility] provider:  10/23/2017   Future Office Visit:    Next 5 appointments (look out 90 days)     Mar 21, 2018 10:15 AM CDT   Return Visit with Donn Price MD   [facility] ([facility]    [address] [address]   [phone]                   Routing refill request to provider for review/approval because:  Drug not on the [facility] refill protocol       Africa Hutchison RN  [facility]

## 2018-01-30 NOTE — TELEPHONE ENCOUNTER
Reason for Call:  Medication or medication refill:    Do you use a [facility]?  Name of the pharmacy and phone number for the current request:  [facility]    Name of the medication requested: omeprazole, atorvastatin (LIPITOR) 40 MG tablet    Other request: Patient is out of medications. Routing as patient calls.     Can we leave a detailed message on this number? YES    Phone number patient can be reached at: Home number on file [phone] (home)    Best Time: any    Call taken on 1/30/2018 at 9:32 AM by Aleshia Ewing

## 2018-01-30 NOTE — TELEPHONE ENCOUNTER
"Requested Prescriptions   Pending Prescriptions Disp Refills     lisinopril (PRINIVIL/ZESTRIL) 5 MG tablet 90 tablet 3     Sig: Take 1 tablet (5 mg) by mouth daily    ACE Inhibitors (Including Combos) Protocol Failed    1/26/2018  3:15 PM       Failed - Blood pressure under 140/90    BP Readings from Last 3 Encounters:   12/07/17 145/79   10/23/17 127/68   03/01/17 112/64                Passed - Recent or future visit with authorizing provider's specialty    Patient had office visit in the last year or has a visit in the next 30 days with authorizing provider.  See \"Patient Info\" tab in inbasket, or \"Choose Columns\" in Meds & Orders section of the refill encounter.            Passed - Patient is age 18 or older       Passed - Normal serum creatinine on file in past 12 months    Recent Labs   Lab Test  10/23/17   1053   01/07/15   0955   CR  1.16   < >   --    CRPOC   --    --   1.1    < > = values in this interval not displayed.            Passed - Normal serum potassium on file in past 12 months    Recent Labs   Lab Test  10/23/17   1053   POTASSIUM  4.2             Routing refill request to provider for review/approval because:  Failed protocols        Africa Hutchison RN  [facility]      "

## 2018-02-02 ENCOUNTER — TELEPHONE (OUTPATIENT)
Dept: FAMILY MEDICINE | Facility: CLINIC | Age: 73
End: 2018-02-02

## 2018-02-02 NOTE — TELEPHONE ENCOUNTER
Reason for Call:  Medication or medication refill:    Do you use a [facility]?  Name of the pharmacy and phone number for the current request:  [facility] [address] [place] [address] [address] [facility]    Name of the medication requested: omeprazole    Other request: received everything but this script please give us call about this    Can we leave a detailed message on this number? YES    Phone number patient can be reached at: [phone]    Best Time: anytime     Call taken on 2/2/2018 at 2:28 PM by Radha Tom

## 2018-02-02 NOTE — TELEPHONE ENCOUNTER
Omeprazole was discontinued on 12/20/2017 by Dr. Donn Price see OV    Called [facility] pharmacy spoke to [facility] pharmacy tech relayed above information - request came from patient - [facility] will call patient and have him call Dr. Price if he has any questions.    Africa Hutchison RN  [facility]

## 2018-02-05 ENCOUNTER — OFFICE VISIT (OUTPATIENT)
Dept: FAMILY MEDICINE | Facility: CLINIC | Age: 73
End: 2018-02-05
Payer: COMMERCIAL

## 2018-02-05 VITALS
BODY MASS INDEX: 24.1 KG/M2 | HEIGHT: 63 IN | DIASTOLIC BLOOD PRESSURE: 69 MMHG | SYSTOLIC BLOOD PRESSURE: 126 MMHG | OXYGEN SATURATION: 98 % | HEART RATE: 72 BPM | WEIGHT: 136 LBS | TEMPERATURE: 97.1 F

## 2018-02-05 DIAGNOSIS — Z12.5 SCREENING FOR PROSTATE CANCER: ICD-10-CM

## 2018-02-05 DIAGNOSIS — R53.83 FATIGUE, UNSPECIFIED TYPE: ICD-10-CM

## 2018-02-05 DIAGNOSIS — I10 HYPERTENSION GOAL BP (BLOOD PRESSURE) < 140/90: ICD-10-CM

## 2018-02-05 DIAGNOSIS — E78.5 HYPERLIPIDEMIA LDL GOAL <70: ICD-10-CM

## 2018-02-05 DIAGNOSIS — K21.9 GASTROESOPHAGEAL REFLUX DISEASE, ESOPHAGITIS PRESENCE NOT SPECIFIED: ICD-10-CM

## 2018-02-05 DIAGNOSIS — R73.01 IMPAIRED FASTING GLUCOSE: ICD-10-CM

## 2018-02-05 DIAGNOSIS — Z00.00 ROUTINE GENERAL MEDICAL EXAMINATION AT A HEALTH CARE FACILITY: Primary | ICD-10-CM

## 2018-02-05 LAB
ALBUMIN SERPL-MCNC: 4 G/DL (ref 3.4–5)
ALP SERPL-CCNC: 105 U/L (ref 40–150)
ALT SERPL W P-5'-P-CCNC: 53 U/L (ref 0–70)
ANION GAP SERPL CALCULATED.3IONS-SCNC: 7 MMOL/L (ref 3–14)
AST SERPL W P-5'-P-CCNC: 36 U/L (ref 0–45)
BASOPHILS # BLD AUTO: 0 10E9/L (ref 0–0.2)
BASOPHILS NFR BLD AUTO: 0.6 %
BILIRUB SERPL-MCNC: 0.4 MG/DL (ref 0.2–1.3)
BUN SERPL-MCNC: 20 MG/DL (ref 7–30)
CALCIUM SERPL-MCNC: 9 MG/DL (ref 8.5–10.1)
CHLORIDE SERPL-SCNC: 103 MMOL/L (ref 94–109)
CHOLEST SERPL-MCNC: 132 MG/DL
CO2 SERPL-SCNC: 28 MMOL/L (ref 20–32)
CREAT SERPL-MCNC: 0.82 MG/DL (ref 0.66–1.25)
DIFFERENTIAL METHOD BLD: ABNORMAL
EOSINOPHIL # BLD AUTO: 0.2 10E9/L (ref 0–0.7)
EOSINOPHIL NFR BLD AUTO: 4.1 %
ERYTHROCYTE [DISTWIDTH] IN BLOOD BY AUTOMATED COUNT: 13.3 % (ref 10–15)
GFR SERPL CREATININE-BSD FRML MDRD: >90 ML/MIN/1.7M2
GLUCOSE SERPL-MCNC: 85 MG/DL (ref 70–99)
HBA1C MFR BLD: 5.5 % (ref 4.3–6)
HCT VFR BLD AUTO: 39 % (ref 40–53)
HDLC SERPL-MCNC: 92 MG/DL
HGB BLD-MCNC: 12.9 G/DL (ref 13.3–17.7)
LDLC SERPL CALC-MCNC: 31 MG/DL
LYMPHOCYTES # BLD AUTO: 1.8 10E9/L (ref 0.8–5.3)
LYMPHOCYTES NFR BLD AUTO: 34 %
MCH RBC QN AUTO: 31.5 PG (ref 26.5–33)
MCHC RBC AUTO-ENTMCNC: 33.1 G/DL (ref 31.5–36.5)
MCV RBC AUTO: 95 FL (ref 78–100)
MONOCYTES # BLD AUTO: 0.4 10E9/L (ref 0–1.3)
MONOCYTES NFR BLD AUTO: 8.2 %
NEUTROPHILS # BLD AUTO: 2.8 10E9/L (ref 1.6–8.3)
NEUTROPHILS NFR BLD AUTO: 53.1 %
NONHDLC SERPL-MCNC: 40 MG/DL
PLATELET # BLD AUTO: 218 10E9/L (ref 150–450)
POTASSIUM SERPL-SCNC: 4.7 MMOL/L (ref 3.4–5.3)
PROT SERPL-MCNC: 7.6 G/DL (ref 6.8–8.8)
PSA SERPL-ACNC: 3.43 UG/L (ref 0–4)
RBC # BLD AUTO: 4.09 10E12/L (ref 4.4–5.9)
SODIUM SERPL-SCNC: 138 MMOL/L (ref 133–144)
TRIGL SERPL-MCNC: 43 MG/DL
TSH SERPL DL<=0.005 MIU/L-ACNC: 1.85 MU/L (ref 0.4–4)
WBC # BLD AUTO: 5.4 10E9/L (ref 4–11)

## 2018-02-05 PROCEDURE — 36415 COLL VENOUS BLD VENIPUNCTURE: CPT | Performed by: FAMILY MEDICINE

## 2018-02-05 PROCEDURE — 80061 LIPID PANEL: CPT | Performed by: FAMILY MEDICINE

## 2018-02-05 PROCEDURE — 83036 HEMOGLOBIN GLYCOSYLATED A1C: CPT | Performed by: FAMILY MEDICINE

## 2018-02-05 PROCEDURE — G0103 PSA SCREENING: HCPCS | Performed by: FAMILY MEDICINE

## 2018-02-05 PROCEDURE — 85025 COMPLETE CBC W/AUTO DIFF WBC: CPT | Performed by: FAMILY MEDICINE

## 2018-02-05 PROCEDURE — 99397 PER PM REEVAL EST PAT 65+ YR: CPT | Performed by: FAMILY MEDICINE

## 2018-02-05 PROCEDURE — 84443 ASSAY THYROID STIM HORMONE: CPT | Performed by: FAMILY MEDICINE

## 2018-02-05 PROCEDURE — 80053 COMPREHEN METABOLIC PANEL: CPT | Performed by: FAMILY MEDICINE

## 2018-02-05 RX ORDER — ATORVASTATIN CALCIUM 40 MG/1
40 TABLET, FILM COATED ORAL DAILY
Qty: 90 TABLET | Refills: 3 | Status: SHIPPED | OUTPATIENT
Start: 2018-02-05 | End: 2019-04-15

## 2018-02-05 RX ORDER — LISINOPRIL 5 MG/1
5 TABLET ORAL DAILY
Qty: 90 TABLET | Refills: 3 | Status: SHIPPED | OUTPATIENT
Start: 2018-02-05 | End: 2019-04-15

## 2018-02-05 ASSESSMENT — ACTIVITIES OF DAILY LIVING (ADL)
CURRENT_FUNCTION: NO ASSISTANCE NEEDED
I_NEED_ASSISTANCE_FOR_THE_FOLLOWING_DAILY_ACTIVITIES:: NO ASSISTANCE IS NEEDED

## 2018-02-05 ASSESSMENT — PAIN SCALES - GENERAL: PAINLEVEL: NO PAIN (0)

## 2018-02-05 NOTE — PATIENT INSTRUCTIONS
We will send you lab results    Stay on same medications    Keep working on healthy diet/exercise

## 2018-02-05 NOTE — MR AVS SNAPSHOT
After Visit Summary   2/5/2018    Donn Allen    MRN: [id number]           Patient Information     Date Of Birth          1945        Visit Information        Provider Department      2/5/2018 10:00 AM Ha Anderson MD [facility]        Today's Diagnoses     Gastroesophageal reflux disease, esophagitis presence not specified    -  1    Hypertension goal BP (blood pressure) < 140/90        Hyperlipidemia LDL goal <70        Screening for prostate cancer        Impaired fasting glucose        Fatigue, unspecified type          Care Instructions    We will send you lab results    Stay on same medications    Keep working on healthy diet/exercise           Follow-ups after your visit        Your next 10 appointments already scheduled     Mar 21, 2018 10:15 AM CDT   Return Visit with Donn Price MD   [facility] ([facility]    [address] [address] [phone]              Who to contact     If you have questions or need follow up information about today's clinic visit or your schedule please contact [facility] directly at [phone].  Normal or non-critical lab and imaging results will be communicated to you by MyChart, letter or phone within 4 business days after the clinic has received the results. If you do not hear from us within 7 days, please contact the clinic through MyChart or phone. If you have a critical or abnormal lab result, we will notify you by phone as soon as possible.  Submit refill requests through Idea2 or call your pharmacy and they will forward the refill request to us. Please allow 3 business days for your refill to be completed.          Additional Information About Your Visit        MyChart Information     Idea2 gives you secure access to your electronic health record. If you see a primary care provider, you can also send messages to your care team and make  "appointments. If you have questions, please call your primary care clinic.  If you do not have a primary care provider, please call [phone] and they will assist you.        Care EveryWhere ID     This is your Care EveryWhere ID. This could be used by other organizations to access your [place] medical records  [id number]        Your Vitals Were     Pulse Temperature Height Pulse Oximetry BMI (Body Mass Index)       72 97.1  F (36.2  C) (Oral) 5' 2.6\" (1.59 m) 98% 24.4 kg/m2        Blood Pressure from Last 3 Encounters:   02/05/18 126/69   12/07/17 145/79   10/23/17 127/68    Weight from Last 3 Encounters:   02/05/18 136 lb (61.7 kg)   10/23/17 129 lb (58.5 kg)   03/01/17 140 lb 3.2 oz (63.6 kg)              We Performed the Following     CBC with platelets differential     Comprehensive metabolic panel     Hemoglobin A1c     Lipid panel reflex to direct LDL Non-fasting     Prostate spec antigen screen     TSH with free T4 reflex          Where to get your medicines      These medications were sent to [facility] [address] [place] [address]  [address] [facility] [address]     Phone:  [phone]     atorvastatin 40 MG tablet    lisinopril 5 MG tablet    omeprazole 20 MG CR capsule          Primary Care Provider Office Phone # Fax #    Ha Anderson [phone]       [address] [facility] [address]        Equal Access to Services     [facility]: Hadii evelia ku hadasho Soomaali, waaxda luqadaha, qaybta kaalmada miltonegyadenis, wax simi lynn . So [facility] [phone].    ATENCIÓN: Si habla español, tiene a santoro disposición servicios gratuitos de asistencia lingüística. Llsana al [phone].    We comply with applicable federal civil rights laws and [place] laws. We do not discriminate on the basis of race, color, national origin, age, disability, sex, sexual orientation, or gender identity.            Thank you!     " Thank you for choosing [facility]  for your care. Our goal is always to provide you with excellent care. Hearing back from our patients is one way we can continue to improve our services. Please take a few minutes to complete the written survey that you may receive in the mail after your visit with us. Thank you!             Your Updated Medication List - Protect others around you: Learn how to safely use, store and throw away your medicines at www.disposemymeds.org.          This list is accurate as of 2/5/18 11:01 AM.  Always use your most recent med list.                   Brand Name Dispense Instructions for use Diagnosis    aspirin 81 MG tablet      Take 1 tablet by mouth daily.        atorvastatin 40 MG tablet    LIPITOR    90 tablet    Take 1 tablet (40 mg) by mouth daily    Hyperlipidemia LDL goal <70       lisinopril 5 MG tablet    PRINIVIL/ZESTRIL    90 tablet    Take 1 tablet (5 mg) by mouth daily    Hypertension goal BP (blood pressure) < 140/90       omeprazole 20 MG CR capsule    priLOSEC    90 capsule    Take 1 capsule (20 mg) by mouth daily    Gastroesophageal reflux disease, esophagitis presence not specified

## 2018-02-05 NOTE — PROGRESS NOTES
SUBJECTIVE:   Donn Allen is a 72 year old male who presents for Preventive Visit.      Are you in the first 12 months of your Medicare coverage?  No    Physical   Annual:     Getting at least 3 servings of Calcium per day::  Yes    Bi-annual eye exam::  Yes    Dental care twice a year::  Yes    Sleep apnea or symptoms of sleep apnea::  Daytime drowsiness    Diet::  Low fat/cholesterol    Frequency of exercise::  4-5 days/week    Duration of exercise::  15-30 minutes    Taking medications regularly::  Yes    Additional concerns today::  No    Ability to successfully perform activities of daily living: no assistance needed  Home Safety:  No safety concerns identified  Hearing Impairment: difficulty following a conversation in a noisy restaurant or crowded room        Fall risk:       COGNITIVE SCREEN  1) Repeat 3 items (Banana, Sunrise, Chair)     2) Clock draw:  NORMAL  3) 3 item recall:  Recalls 3 objects  Results: NORMAL clock, 1-2 items recalled: COGNITIVE IMPAIRMENT LESS LIKELY    Mini-CogTM Copyright LINDA Leo. Licensed by the author for use in [facility]; reprinted with permission (hardeep@[facility]). All rights reserved.        Reviewed and updated as needed this visit by clinical staff  Tobacco  Allergies  Meds  Med Hx  Surg Hx  Fam Hx  Soc Hx        Reviewed and updated as needed this visit by Provider        Social History   Substance Use Topics     Smoking status: Former Smoker     Types: Cigarettes     Quit date: 1/11/1972     Smokeless tobacco: Never Used      Comment: non-smoking household     Alcohol use Yes      Comment: Whiskey-1 shots daily        Alcohol Use 2/5/2018   If you drink alcohol, do you typically have greater than 3 drinks per day OR greater than 7 drinks per week?   No   No flowsheet data found.        None    No particular concerns/ questions    Needs renew meds      Had catarct procedure early Chinmay avila has not controlled symptoms as well as omeprazole      Today's  PHQ-2 Score:   PHQ-2 ( 1999 [employer]) 2/5/2018   Q1: Little interest or pleasure in doing things 0   Q2: Feeling down, depressed or hopeless 0   PHQ-2 Score 0   Q1: Little interest or pleasure in doing things Not at all   Q2: Feeling down, depressed or hopeless Not at all   PHQ-2 Score 0       Do you feel safe in your environment - Yes    Do you have a Health Care Directive?: Yes: Advance Directive has been received and scanned.    Current providers sharing in care for this patient include:   Patient Care Team:  Ha Anderson MD as PCP - Iesha Ugalde MD as Referring Physician (Emergency Medicine)    The following health maintenance items are reviewed in Epic and correct as of today:  Health Maintenance   Topic Date Due     EYE EXAM Q1 YEAR  06/06/2018     FALL RISK ASSESSMENT  10/23/2018     ADVANCE DIRECTIVE PLANNING Q5 YRS  04/04/2019     LIPID SCREEN Q5 YR MALE (SYSTEM ASSIGNED)  01/06/2022     TETANUS IMMUNIZATION (SYSTEM ASSIGNED)  04/17/2023     COLON CANCER SCREEN (SYSTEM ASSIGNED)  03/18/2024     INFLUENZA VACCINE (SYSTEM ASSIGNED)  Addressed     PNEUMOCOCCAL  Completed     AORTIC ANEURYSM SCREENING (SYSTEM ASSIGNED)  Completed     HEPATITIS C SCREENING  Completed      Review of Systems  C: NEGATIVE for fever, chills, change in weight  I: NEGATIVE for worrisome rashes, moles or lesions  E: NEGATIVE for vision changes or irritation  E/M: NEGATIVE for ear, mouth and throat problems  R: NEGATIVE for significant cough or SOB  B: NEGATIVE for masses, tenderness or discharge  CV: NEGATIVE for chest pain, palpitations or peripheral edema  GI: NEGATIVE for nausea, abdominal pain, heartburn, or change in bowel habits  : NEGATIVE for frequency, dysuria, or hematuria  M: NEGATIVE for significant arthralgias or myalgia  N: NEGATIVE for weakness, dizziness or paresthesias  E: NEGATIVE for temperature intolerance, skin/hair changes  H: NEGATIVE for bleeding problems  P: NEGATIVE for changes in mood  "or affect  Had stress test done, normal  Cut back on the half-caf coffee    Remodeling house    Rides odilon    Walks dogs daily    [place]    May spend next winter up there, just to do it one time        OBJECTIVE:   /69 (BP Location: Right arm, Patient Position: Chair, Cuff Size: Adult Regular)  Pulse 72  Temp 97.1  F (36.2  C) (Oral)  Ht 5' 2.6\" (1.59 m)  Wt 136 lb (61.7 kg)  SpO2 98%  BMI 24.4 kg/m2 Estimated body mass index is 24.4 kg/(m^2) as calculated from the following:    Height as of this encounter: 5' 2.6\" (1.59 m).    Weight as of this encounter: 136 lb (61.7 kg).  Physical Exam  GENERAL: healthy, alert and no distress  EYES: Eyes grossly normal to inspection, PERRL and conjunctivae and sclerae normal  HENT: ear canals and TM's normal, nose and mouth without ulcers or lesions  NECK: no adenopathy, no asymmetry, masses, or scars and thyroid normal to palpation  RESP: lungs clear to auscultation - no rales, rhonchi or wheezes  CV: regular rate and rhythm, normal S1 S2, no S3 or S4, no murmur, click or rub, no peripheral edema and peripheral pulses strong  ABDOMEN: soft, nontender, no hepatosplenomegaly, no masses and bowel sounds normal   (male): normal male genitalia without lesions or urethral discharge, no hernia  RECTAL: normal sphincter tone, no rectal masses, prostate normal size, smooth, nontender without nodules or masses  MS: no gross musculoskeletal defects noted, no edema  SKIN: no suspicious lesions or rashes  NEURO: Normal strength and tone, mentation intact and speech normal  PSYCH: mentation appears normal, affect normal/bright    ASSESSMENT / PLAN:   Donn was seen today for wellness visit.    Diagnoses and all orders for this visit:    Routine general medical examination at a health care facility    Gastroesophageal reflux disease, esophagitis presence not specified  -     omeprazole (PRILOSEC) 20 MG CR capsule; Take 1 capsule (20 mg) by mouth " "daily    Hypertension goal BP (blood pressure) < 140/90  -     lisinopril (PRINIVIL/ZESTRIL) 5 MG tablet; Take 1 tablet (5 mg) by mouth daily    Hyperlipidemia LDL goal <70  -     atorvastatin (LIPITOR) 40 MG tablet; Take 1 tablet (40 mg) by mouth daily  -     Lipid panel reflex to direct LDL Non-fasting  -     Comprehensive metabolic panel    Screening for prostate cancer  -     Prostate spec antigen screen    Impaired fasting glucose  -     Hemoglobin A1c    Fatigue, unspecified type  -     TSH with free T4 reflex  -     CBC with platelets differential    overall patient stable  Check labs   Refill meds  Keep working on healthy diet/exercise   Up to date on colonoscopy    End of Life Planning:  Patient currently has an advanced directive: Yes.  Practitioner is supportive of decision.    COUNSELING:  Reviewed preventive health counseling, as reflected in patient instructions       Regular exercise       Healthy diet/nutrition       Vision screening       Dental care       Colon cancer screening        Estimated body mass index is 24.4 kg/(m^2) as calculated from the following:    Height as of this encounter: 5' 2.6\" (1.59 m).    Weight as of this encounter: 136 lb (61.7 kg).     reports that he quit smoking about 46 years ago. His smoking use included Cigarettes. He has never used smokeless tobacco.      Appropriate preventive services were discussed with this patient, including applicable screening as appropriate for cardiovascular disease, diabetes, osteopenia/osteoporosis, and glaucoma.  As appropriate for age/gender, discussed screening for colorectal cancer, prostate cancer, breast cancer, and cervical cancer. Checklist reviewing preventive services available has been given to the patient.    Reviewed patients plan of care and provided an AVS. The Basic Care Plan (routine screening as documented in Health Maintenance) for Donn meets the Care Plan requirement. This Care Plan has been established and reviewed " with the Patient.    Counseling Resources:  ATP IV Guidelines  Pooled Cohorts Equation Calculator  Breast Cancer Risk Calculator  FRAX Risk Assessment  ICSI Preventive Guidelines  Dietary Guidelines for Americans, 2010  Expediciones.mx's MyPlate  ASA Prophylaxis  Lung CA Screening    Ha Anderson MD  [facility]  Answers for HPI/ROS submitted by the patient on 2/5/2018   PHQ-2 Score: 0

## 2018-03-26 ENCOUNTER — OFFICE VISIT (OUTPATIENT)
Dept: OPHTHALMOLOGY | Facility: CLINIC | Age: 73
End: 2018-03-26
Payer: COMMERCIAL

## 2018-03-26 DIAGNOSIS — Z96.1 PSEUDOPHAKIA: Primary | ICD-10-CM

## 2018-03-26 DIAGNOSIS — H27.01 APHAKIA OF RIGHT EYE: ICD-10-CM

## 2018-03-26 PROCEDURE — 99213 OFFICE O/P EST LOW 20 MIN: CPT | Performed by: OPHTHALMOLOGY

## 2018-03-26 ASSESSMENT — EXTERNAL EXAM - LEFT EYE: OS_EXAM: 3+ BROW PTOSIS

## 2018-03-26 ASSESSMENT — VISUAL ACUITY
METHOD: SNELLEN - LINEAR
OS_CC: 20/20

## 2018-03-26 ASSESSMENT — TONOMETRY
OS_IOP_MMHG: 08
IOP_METHOD: APPLANATION
OD_IOP_MMHG: 11

## 2018-03-26 ASSESSMENT — SLIT LAMP EXAM - LIDS
COMMENTS: 1+ DERMATOCHALASIS - UPPER LID
COMMENTS: 1+ DERMATOCHALASIS - UPPER LID

## 2018-03-26 ASSESSMENT — EXTERNAL EXAM - RIGHT EYE: OD_EXAM: 3+ BROW PTOSIS

## 2018-03-26 NOTE — LETTER
3/26/2018         RE: Donn Allen  [address] [address]        Dear Colleague,    Thank you for referring your patient, Donn Allen, to the [facility]. Please see a copy of my visit note below.     Current Eye Medications:  no     Subjective:  3 mo iop recheck.  Pt reports vision remains very poor in his right eye and continues to see ok in his left eye.    Finds often closing right eye.  May have had CL evaluation at [facility] in past.     Objective:  See Ophthalmology Exam.       Assessment:  Doing well status/post Kelman phacoemulsification/ posterior chamber lens left eye.      Plan:  Possible clouding of posterior capsule discussed.   Could consider a contact lens evaluation for the right eye - aphakic with pupil aperture.      Call in November 2018 for an appointment in March 2019 for Complete Exam.    Dr. Price [phone]         Again, thank you for allowing me to participate in the care of your patient.        Sincerely,        Donn Price MD

## 2018-03-26 NOTE — PATIENT INSTRUCTIONS
Possible clouding of posterior capsule discussed.   Could consider a contact lens evaluation for the right eye.   Call in November 2018 for an appointment in March 2019 for Complete Exam.    Dr. Price [phone]

## 2018-03-26 NOTE — MR AVS SNAPSHOT
After Visit Summary   3/26/2018    Donn Allen    MRN: [id number]           Patient Information     Date Of Birth          1945        Visit Information        Provider Department      3/26/2018 9:45 AM Donn Price MD [facility]        Today's Diagnoses     Pseudophakia, os    -  1      Care Instructions    Possible clouding of posterior capsule discussed.   Could consider a contact lens evaluation for the right eye.   Call in November 2018 for an appointment in March 2019 for Complete Exam.    Dr. Price [phone]          Follow-ups after your visit        Who to contact     If you have questions or need follow up information about today's clinic visit or your schedule please contact [facility] directly at [phone].  Normal or non-critical lab and imaging results will be communicated to you by MyChart, letter or phone within 4 business days after the clinic has received the results. If you do not hear from us within 7 days, please contact the clinic through MyChart or phone. If you have a critical or abnormal lab result, we will notify you by phone as soon as possible.  Submit refill requests through Stublisher or call your pharmacy and they will forward the refill request to us. Please allow 3 business days for your refill to be completed.          Additional Information About Your Visit        MyChart Information     Stublisher gives you secure access to your electronic health record. If you see a primary care provider, you can also send messages to your care team and make appointments. If you have questions, please call your primary care clinic.  If you do not have a primary care provider, please call [phone] and they will assist you.        Care EveryWhere ID     This is your Care EveryWhere ID. This could be used by other organizations to access your [place] medical records  [id number]         Blood Pressure from Last 3 Encounters:   02/05/18  126/69   12/07/17 145/79   10/23/17 127/68    Weight from Last 3 Encounters:   02/05/18 61.7 kg (136 lb)   10/23/17 58.5 kg (129 lb)   03/01/17 63.6 kg (140 lb 3.2 oz)              Today, you had the following     No orders found for display       Primary Care Provider Office Phone # Fax #    Ha Anderson [phone]       [address] [address]        Equal Access to Services     [facility] : Hadii aad ku hadasho Soomaali, waaxda luqadaha, qaybta kaalmada adeegyada, waxyaya belcher hayannamarie lynn . So [facility] [phone].    ATENCIÓN: Si habla español, tiene a santoro disposición servicios gratuitos de asistencia lingüística. Ll[facility] [phone].    We comply with applicable federal civil rights laws and [place] laws. We do not discriminate on the basis of race, color, national origin, age, disability, sex, sexual orientation, or gender identity.            Thank you!     Thank you for choosing [facility]  for your care. Our goal is always to provide you with excellent care. Hearing back from our patients is one way we can continue to improve our services. Please take a few minutes to complete the written survey that you may receive in the mail after your visit with us. Thank you!             Your Updated Medication List - Protect others around you: Learn how to safely use, store and throw away your medicines at www.disposemymeds.org.          This list is accurate as of 3/26/18 10:26 AM.  Always use your most recent med list.                   Brand Name Dispense Instructions for use Diagnosis    aspirin 81 MG tablet      Take 1 tablet by mouth daily.        atorvastatin 40 MG tablet    LIPITOR    90 tablet    Take 1 tablet (40 mg) by mouth daily    Hyperlipidemia LDL goal <70       lisinopril 5 MG tablet    PRINIVIL/ZESTRIL    90 tablet    Take 1 tablet (5 mg) by mouth daily    Hypertension goal BP (blood pressure) < 140/90       omeprazole 20 MG  CR capsule    priLOSEC    90 capsule    Take 1 capsule (20 mg) by mouth daily    Gastroesophageal reflux disease, esophagitis presence not specified

## 2018-03-26 NOTE — PROGRESS NOTES
Current Eye Medications:  no     Subjective:  3 mo iop recheck.  Pt reports vision remains very poor in his right eye and continues to see ok in his left eye.    Finds often closing right eye.  May have had CL evaluation at [facility] in past.     Objective:  See Ophthalmology Exam.       Assessment:  Doing well status/post Kelman phacoemulsification/ posterior chamber lens left eye.      Plan:  Possible clouding of posterior capsule discussed.   Could consider a contact lens evaluation for the right eye - aphakic with pupil aperture.      Call in November 2018 for an appointment in March 2019 for Complete Exam.    Dr. Price [phone]

## 2018-03-28 PROBLEM — H27.01 APHAKIA OF RIGHT EYE: Status: ACTIVE | Noted: 2018-03-28

## 2018-05-13 NOTE — TELEPHONE ENCOUNTER
Called patient at [phone] (home) and notified him of message below from provider. Patient stated understanding of new medication sent.     Alison Jeffrey RN  [facility]     pavement

## 2018-12-10 ENCOUNTER — ALLIED HEALTH/NURSE VISIT (OUTPATIENT)
Dept: NURSING | Facility: CLINIC | Age: 73
End: 2018-12-10
Payer: COMMERCIAL

## 2018-12-10 DIAGNOSIS — Z23 NEED FOR PROPHYLACTIC VACCINATION AND INOCULATION AGAINST INFLUENZA: Primary | ICD-10-CM

## 2018-12-10 PROCEDURE — 90662 IIV NO PRSV INCREASED AG IM: CPT

## 2018-12-10 PROCEDURE — G0008 ADMIN INFLUENZA VIRUS VAC: HCPCS

## 2018-12-10 PROCEDURE — 99207 ZZC NO CHARGE NURSE ONLY: CPT

## 2018-12-10 NOTE — NURSING NOTE
Patient instructed to remain in clinic for 15 minutes afterwards, and to report any adverse reaction to me immediately.    Prior to injection verified patient identity using patient's name and date of birth.  Vaccine information supplied for influenza.  Lynn See JODIE Steve

## 2019-01-14 ENCOUNTER — ANCILLARY PROCEDURE (OUTPATIENT)
Dept: GENERAL RADIOLOGY | Facility: CLINIC | Age: 74
End: 2019-01-14
Payer: MEDICARE

## 2019-01-14 ENCOUNTER — OFFICE VISIT (OUTPATIENT)
Dept: FAMILY MEDICINE | Facility: CLINIC | Age: 74
End: 2019-01-14
Payer: MEDICARE

## 2019-01-14 ENCOUNTER — TELEPHONE (OUTPATIENT)
Dept: FAMILY MEDICINE | Facility: CLINIC | Age: 74
End: 2019-01-14

## 2019-01-14 VITALS
SYSTOLIC BLOOD PRESSURE: 128 MMHG | DIASTOLIC BLOOD PRESSURE: 72 MMHG | HEART RATE: 79 BPM | HEIGHT: 63 IN | TEMPERATURE: 97 F | WEIGHT: 136.5 LBS | BODY MASS INDEX: 24.19 KG/M2

## 2019-01-14 DIAGNOSIS — K59.00 CONSTIPATION, UNSPECIFIED CONSTIPATION TYPE: ICD-10-CM

## 2019-01-14 DIAGNOSIS — R07.81 RIB PAIN ON RIGHT SIDE: ICD-10-CM

## 2019-01-14 DIAGNOSIS — R05.9 COUGH: Primary | ICD-10-CM

## 2019-01-14 DIAGNOSIS — R05.9 COUGH: ICD-10-CM

## 2019-01-14 PROCEDURE — 99213 OFFICE O/P EST LOW 20 MIN: CPT | Performed by: FAMILY MEDICINE

## 2019-01-14 PROCEDURE — 71101 X-RAY EXAM UNILAT RIBS/CHEST: CPT | Mod: RT

## 2019-01-14 RX ORDER — POLYETHYLENE GLYCOL 3350 17 G/17G
1 POWDER, FOR SOLUTION ORAL 3 TIMES DAILY PRN
Qty: 225 G | Refills: 1 | Status: SHIPPED | OUTPATIENT
Start: 2019-01-14 | End: 2020-01-14

## 2019-01-14 ASSESSMENT — PAIN SCALES - GENERAL: PAINLEVEL: NO PAIN (0)

## 2019-01-14 ASSESSMENT — MIFFLIN-ST. JEOR: SCORE: 1252.9

## 2019-01-14 NOTE — TELEPHONE ENCOUNTER
Patient scheduled an appointment via MeetCasth[facility] for 2:40PM today with PCP for the following:    Sever Pain in right side of ribs    Routing to review symptoms prior to appointment.

## 2019-01-14 NOTE — PATIENT INSTRUCTIONS
Could use over the counter stool softener ( docusate )     Continue fiber    Short term at least use miralax as needed, reduce usage as symptoms improve    Call later this week if symptoms not resolving    Be seen promptly if symptoms acutely worsen

## 2019-01-14 NOTE — PROGRESS NOTES
SUBJECTIVE:   Donn Allen is a 73 year old male who presents to clinic today for the following health issues:       Right side pain since last Wednesday but states he has had this pain off and on for the past 2 years. He states it is uncomfortable to lay down or move around    none    Problem list and histories reviewed & adjusted, as indicated.  Additional history: as documented         Reviewed and updated as needed this visit by clinical staff  Tobacco  Allergies  Meds  Med Hx  Surg Hx  Fam Hx  Soc Hx      Reviewed and updated as needed this visit by Provider          injured 2 years ago, working on wife's car    reinjured one month ago.  [place]  Leaning against transom    Throws ball to dogs    Phlegm    Increased fiber      Full physical not done     Mentation and affect are fine    No tremor of speech or extremity    Heart and lungs fine    Patient points to right hemithorax when asked where pain is    No swelling/ discoloration/ warmth    No particular point tenderness    We did find one mildly tender area but not obviously a rib    When he takes a slow breath no pain but quick one is painful    Xray done  No rib fx seen by me  Large collection of stool right upper quadrant      ASSESSMENT / PLAN:  (R05) Cough  (primary encounter diagnosis)  Comment: as above   Plan: XR Ribs & Chest Right G/E 3 Views             (R07.81) Rib pain on right side  Comment: no obvious rib issues   Plan: XR Ribs & Chest Right G/E 3 Views             (K59.00) Constipation, unspecified constipation type  Comment: this is likely contributing to problem.  Discussed in detail.   Plan: polyethylene glycol (MIRALAX/GLYCOLAX) powder        Use miralax several times daily.  Also stool softener.  Stay hydrated, active.    Be seen promptly if symptoms acutely worsen     Call if symptoms not resolving soon       I reviewed the patient's medications, allergies, medical history, family history, and  social history.    Ha Anderson MD

## 2019-01-14 NOTE — TELEPHONE ENCOUNTER
Attempt # 2  Called patient at home [phone]  Was call answered?  No answer, left message to call nurse line at [phone]      Africa Hutchison RN  [facility]

## 2019-01-14 NOTE — TELEPHONE ENCOUNTER
Attempt # 1  Called patient at home [phone]  Was call answered?  No answer, left message to call nurse line at [phone]     Africa Hutchison RN  [facility]

## 2019-01-15 NOTE — TELEPHONE ENCOUNTER
Patient seen in clinic 1/14/2019.    Africa Hutchison RN  [facility]

## 2019-01-28 DIAGNOSIS — K21.9 GASTROESOPHAGEAL REFLUX DISEASE, ESOPHAGITIS PRESENCE NOT SPECIFIED: ICD-10-CM

## 2019-01-28 NOTE — TELEPHONE ENCOUNTER
Prescription approved per [facility] Ref[facility] Protocol.  Barbra Pierre, RN [facility] Triage.

## 2019-01-28 NOTE — TELEPHONE ENCOUNTER
"Requested Prescriptions   Pending Prescriptions Disp Refills     omeprazole (PRILOSEC) 20 MG DR capsule [Pharmacy Med Name: OMEPRAZOLE 20MG CAP] 90 capsule 3    Last Written Prescription Date:  2-5-18  Last Fill Quantity: 90,  # refills: 3   Last office visit: 1/14/2019 with prescribing provider:  1-14-19   Future Office Visit:     Sig: TAKE ONE CAPSULE BY MOUTH ONCE DAILY    PPI Protocol Passed - 1/28/2019 12:06 PM       Passed - Not on Clopidogrel (unless Pantoprazole ordered)       Passed - No diagnosis of osteoporosis on record       Passed - Recent (12 mo) or future (30 days) visit within the authorizing provider's specialty    Patient had office visit in the last 12 months or has a visit in the next 30 days with authorizing provider or within the authorizing provider's specialty.  See \"Patient Info\" tab in inbasket, or \"Choose Columns\" in Meds & Orders section of the refill encounter.             Passed - Medication is active on med list       Passed - Patient is age 18 or older          "

## 2019-01-31 DIAGNOSIS — K21.9 GASTROESOPHAGEAL REFLUX DISEASE, ESOPHAGITIS PRESENCE NOT SPECIFIED: ICD-10-CM

## 2019-03-15 ENCOUNTER — DOCUMENTATION ONLY (OUTPATIENT)
Dept: OPHTHALMOLOGY | Facility: CLINIC | Age: 74
End: 2019-03-15

## 2019-04-15 ENCOUNTER — OFFICE VISIT (OUTPATIENT)
Dept: FAMILY MEDICINE | Facility: CLINIC | Age: 74
End: 2019-04-15
Payer: MEDICARE

## 2019-04-15 VITALS
TEMPERATURE: 98.3 F | HEART RATE: 64 BPM | DIASTOLIC BLOOD PRESSURE: 66 MMHG | WEIGHT: 136.5 LBS | BODY MASS INDEX: 24.49 KG/M2 | SYSTOLIC BLOOD PRESSURE: 125 MMHG

## 2019-04-15 DIAGNOSIS — R53.83 FATIGUE, UNSPECIFIED TYPE: ICD-10-CM

## 2019-04-15 DIAGNOSIS — Z00.00 ENCOUNTER FOR PREVENTATIVE ADULT HEALTH CARE EXAMINATION: Primary | ICD-10-CM

## 2019-04-15 DIAGNOSIS — Z12.5 SCREENING FOR PROSTATE CANCER: ICD-10-CM

## 2019-04-15 DIAGNOSIS — Z23 NEED FOR SHINGLES VACCINE: ICD-10-CM

## 2019-04-15 DIAGNOSIS — I10 HYPERTENSION GOAL BP (BLOOD PRESSURE) < 140/90: ICD-10-CM

## 2019-04-15 DIAGNOSIS — K21.9 GASTROESOPHAGEAL REFLUX DISEASE, ESOPHAGITIS PRESENCE NOT SPECIFIED: ICD-10-CM

## 2019-04-15 DIAGNOSIS — H54.7 VISION PROBLEM: ICD-10-CM

## 2019-04-15 DIAGNOSIS — E78.5 HYPERLIPIDEMIA LDL GOAL <70: ICD-10-CM

## 2019-04-15 LAB
BASOPHILS # BLD AUTO: 0 10E9/L (ref 0–0.2)
BASOPHILS NFR BLD AUTO: 0.4 %
DIFFERENTIAL METHOD BLD: ABNORMAL
EOSINOPHIL # BLD AUTO: 0.1 10E9/L (ref 0–0.7)
EOSINOPHIL NFR BLD AUTO: 3 %
ERYTHROCYTE [DISTWIDTH] IN BLOOD BY AUTOMATED COUNT: 13.1 % (ref 10–15)
HCT VFR BLD AUTO: 38.7 % (ref 40–53)
HGB BLD-MCNC: 12.9 G/DL (ref 13.3–17.7)
LYMPHOCYTES # BLD AUTO: 1.6 10E9/L (ref 0.8–5.3)
LYMPHOCYTES NFR BLD AUTO: 34.3 %
MCH RBC QN AUTO: 31.2 PG (ref 26.5–33)
MCHC RBC AUTO-ENTMCNC: 33.3 G/DL (ref 31.5–36.5)
MCV RBC AUTO: 94 FL (ref 78–100)
MONOCYTES # BLD AUTO: 0.4 10E9/L (ref 0–1.3)
MONOCYTES NFR BLD AUTO: 8.6 %
NEUTROPHILS # BLD AUTO: 2.5 10E9/L (ref 1.6–8.3)
NEUTROPHILS NFR BLD AUTO: 53.7 %
PLATELET # BLD AUTO: 217 10E9/L (ref 150–450)
RBC # BLD AUTO: 4.14 10E12/L (ref 4.4–5.9)
WBC # BLD AUTO: 4.7 10E9/L (ref 4–11)

## 2019-04-15 PROCEDURE — G0103 PSA SCREENING: HCPCS | Performed by: FAMILY MEDICINE

## 2019-04-15 PROCEDURE — G0439 PPPS, SUBSEQ VISIT: HCPCS | Performed by: FAMILY MEDICINE

## 2019-04-15 PROCEDURE — 80053 COMPREHEN METABOLIC PANEL: CPT | Performed by: FAMILY MEDICINE

## 2019-04-15 PROCEDURE — 85025 COMPLETE CBC W/AUTO DIFF WBC: CPT | Performed by: FAMILY MEDICINE

## 2019-04-15 PROCEDURE — 84443 ASSAY THYROID STIM HORMONE: CPT | Performed by: FAMILY MEDICINE

## 2019-04-15 PROCEDURE — 80061 LIPID PANEL: CPT | Performed by: FAMILY MEDICINE

## 2019-04-15 PROCEDURE — 36415 COLL VENOUS BLD VENIPUNCTURE: CPT | Performed by: FAMILY MEDICINE

## 2019-04-15 RX ORDER — ATORVASTATIN CALCIUM 40 MG/1
40 TABLET, FILM COATED ORAL DAILY
Qty: 90 TABLET | Refills: 3 | Status: SHIPPED | OUTPATIENT
Start: 2019-04-15 | End: 2020-04-22

## 2019-04-15 RX ORDER — LISINOPRIL 5 MG/1
5 TABLET ORAL DAILY
Qty: 90 TABLET | Refills: 3 | Status: SHIPPED | OUTPATIENT
Start: 2019-04-15 | End: 2020-04-22

## 2019-04-15 ASSESSMENT — ENCOUNTER SYMPTOMS
HEARTBURN: 0
MYALGIAS: 0
DYSURIA: 0
DIZZINESS: 0
WEAKNESS: 0
FREQUENCY: 1
NAUSEA: 0
ARTHRALGIAS: 1
SHORTNESS OF BREATH: 0
DIARRHEA: 0
FEVER: 0
NERVOUS/ANXIOUS: 0
HEMATOCHEZIA: 0
PARESTHESIAS: 0
EYE PAIN: 0
ABDOMINAL PAIN: 0
JOINT SWELLING: 0
SORE THROAT: 0
HEADACHES: 0
HEMATURIA: 0
CHILLS: 1
COUGH: 0
CONSTIPATION: 0
PALPITATIONS: 0

## 2019-04-15 ASSESSMENT — PAIN SCALES - GENERAL: PAINLEVEL: NO PAIN (0)

## 2019-04-15 ASSESSMENT — ACTIVITIES OF DAILY LIVING (ADL): CURRENT_FUNCTION: NO ASSISTANCE NEEDED

## 2019-04-15 NOTE — PROGRESS NOTES
"SUBJECTIVE:   Donn Allen is a 74 year old male who presents for Preventive Visit.      Are you in the first 12 months of your Medicare coverage?  No    Healthy Habits:     In general, how would you rate your overall health?  Good    Frequency of exercise:  6-7 days/week    Duration of exercise:  45-60 minutes    Do you usually eat at least 4 servings of fruit and vegetables a day, include whole grains    & fiber and avoid regularly eating high fat or \"junk\" foods?  Yes    Medication side effects:  Other    Ability to successfully perform activities of daily living:  No assistance needed    Home Safety:  No safety concerns identified    Hearing Impairment:  Need to ask people to speak up or repeat themselves    In the past 6 months, have you been bothered by leaking of urine? Yes    In general, how would you rate your overall mental or emotional health?  Good      PHQ-2 Total Score: 0    Additional concerns today:  No    Do you feel safe in your environment? Yes    Do you have a Health Care Directive? Yes: Advance Directive has been received and scanned.      Fall risk  Fall Risk Assessment not completed.    Cognitive Screening   1) Repeat 3 items (Leader, Season, Table)     2) Clock draw:  NORMAL  3) 3 item recall:  Recalls 3 objects  Results: okay    Mini-CogTM Copyright S Ahmet. Licensed by the author for use in [facility]; reprinted with permission ([email or web address]@.[email or web address]). All rights reserved.      Do you have sleep apnea, excessive snoring or daytime drowsiness?: no    Reviewed and updated as needed this visit by clinical staff  Tobacco  Allergies  Meds  Med Hx  Surg Hx  Fam Hx  Soc Hx        Reviewed and updated as needed this visit by Provider        Social History     Tobacco Use     Smoking status: Former Smoker     Types: Cigarettes     Last attempt to quit: 1972     Years since quittin.2     Smokeless tobacco: Never Used     Tobacco comment: non-smoking household   Substance Use " Topics     Alcohol use: Yes     Comment: Whiskey-1 shots daily      If you drink alcohol do you typically have >3 drinks per day or >7 drinks per week? No    Alcohol Use 4/15/2019   Prescreen: >3 drinks/day or >7 drinks/week? No   Prescreen: >3 drinks/day or >7 drinks/week? -   No flowsheet data found.        none    Current providers sharing in care for this patient include:   Patient Care Team:  Ha Anderson MD as PCP - General  Ha Anderson MD as Assigned PCP  Iesha Pedraza MD as Referring Physician (Emergency Medicine)    The following health maintenance items are reviewed in Epic and correct as of today:  Health Maintenance   Topic Date Due     DTAP/TDAP/TD IMMUNIZATION (1 - Tdap) 03/29/1970     ZOSTER IMMUNIZATION (1 of 2) 03/29/1995     EYE EXAM Q1 YEAR  06/06/2018     MEDICARE ANNUAL WELLNESS VISIT  02/05/2019     FALL RISK ASSESSMENT  01/14/2020     PHQ-2 Q1 YR  04/15/2020     LIPID SCREEN Q5 YR MALE (SYSTEM ASSIGNED)  02/05/2023     COLON CANCER SCREEN (SYSTEM ASSIGNED)  03/18/2024     ADVANCE DIRECTIVE PLANNING Q5 YRS  04/15/2024     INFLUENZA VACCINE  Completed     PNEUMOCOCCAL IMMUNIZATION 65+ LOW/MEDIUM RISK  Completed     AORTIC ANEURYSM SCREENING (SYSTEM ASSIGNED)  Completed     HEPATITIS C SCREENING  Completed     IPV IMMUNIZATION  Aged Out     MENINGITIS IMMUNIZATION  Aged Out             Review of Systems   Constitutional: Positive for chills. Negative for fever.   HENT: Positive for congestion. Negative for ear pain, hearing loss and sore throat.    Eyes: Negative for pain and visual disturbance.   Respiratory: Negative for cough and shortness of breath.    Cardiovascular: Negative for chest pain, palpitations and peripheral edema.   Gastrointestinal: Negative for abdominal pain, constipation, diarrhea, heartburn, hematochezia and nausea.   Genitourinary: Positive for frequency and urgency. Negative for discharge, dysuria, genital sores, hematuria and impotence.  "  Musculoskeletal: Positive for arthralgias. Negative for joint swelling and myalgias.   Skin: Negative for rash.   Neurological: Negative for dizziness, weakness, headaches and paresthesias.   Psychiatric/Behavioral: Negative for mood changes. The patient is not nervous/anxious.      Stream slow but no pain    Lot of coffee daily    Phlegm is from the coffee he states    Cut out bourbon    [place] care of the place up there okay    No constipation recently; not needing miralax recently    Kids in good health    Walks with dogs a lot    Sleeping well    Works on his own place a lot          OBJECTIVE:   /66 (BP Location: Right arm, Patient Position: Chair, Cuff Size: Adult Regular)   Pulse 64   Temp 98.3  F (36.8  C) (Oral)   Wt 61.9 kg (136 lb 8 oz)   BMI 24.49 kg/m   Estimated body mass index is 24.49 kg/m  as calculated from the following:    Height as of 1/14/19: 1.59 m (5' 2.6\").    Weight as of this encounter: 61.9 kg (136 lb 8 oz).  Physical Exam  GENERAL: healthy, alert and no distress  EYES: Eyes grossly normal to inspection, PERRL and conjunctivae and sclerae normal  HENT: ear canals and TM's normal, nose and mouth without ulcers or lesions  NECK: no adenopathy, no asymmetry, masses, or scars and thyroid normal to palpation  RESP: lungs clear to auscultation - no rales, rhonchi or wheezes  CV: regular rate and rhythm, normal S1 S2, no S3 or S4, no murmur, click or rub, no peripheral edema and peripheral pulses strong  ABDOMEN: soft, nontender, no hepatosplenomegaly, no masses and bowel sounds normal   (male): normal male genitalia without lesions or urethral discharge, no hernia  MS: no gross musculoskeletal defects noted, no edema  SKIN: no suspicious lesions or rashes  NEURO: Normal strength and tone, mentation intact and speech normal  PSYCH: mentation appears normal, affect normal/bright    Diagnostic Test Results:  none " "    ASSESSMENT / PLAN:   Donn was seen today for wellness visit and health maintenance.    Diagnoses and all orders for this visit:    Encounter for preventative adult health care examination    Hyperlipidemia LDL goal <70  -     atorvastatin (LIPITOR) 40 MG tablet; Take 1 tablet (40 mg) by mouth daily  -     Lipid panel reflex to direct LDL Non-fasting  -     Comprehensive metabolic panel    Hypertension goal BP (blood pressure) < 140/90  -     lisinopril (PRINIVIL/ZESTRIL) 5 MG tablet; Take 1 tablet (5 mg) by mouth daily    Gastroesophageal reflux disease, esophagitis presence not specified  -     omeprazole (PRILOSEC) 20 MG DR capsule; TAKE ONE CAPSULE BY MOUTH ONCE DAILY    Vision problem  -     OPHTHALMOLOGY ADULT REFERRAL    Need for shingles vaccine  -     Cancel: VACCINE ADMINISTRATION, INITIAL  -     Discontinue: zoster vaccine recombinant adjuvanted (SHINGRIX) injection; Inject 0.5 mLs into the muscle once for 1 dose    Screening for prostate cancer  -     Prostate spec antigen screen    Fatigue, unspecified type  -     TSH with free T4 reflex  -     CBC with platelets differential    Discussed multiple issues with patient  Check labs  Refill meds  Patient sent to pharmacy for shingles shot but apparently not covered or some other issue; did not get today  Patient to schedule eye exam  Keep working on healthy diet/exercise     End of Life Planning:  Patient currently has an advanced directive: Yes.  Practitioner is supportive of decision.    COUNSELING:  Reviewed preventive health counseling, as reflected in patient instructions       Regular exercise       Healthy diet/nutrition       Vision screening       Hearing screening       Dental care       Colon cancer screening       Prostate cancer screening    BP Readings from Last 1 Encounters:   04/15/19 125/66     Estimated body mass index is 24.49 kg/m  as calculated from the following:    Height as of 1/14/19: 1.59 m (5' 2.6\").    Weight as of this " encounter: 61.9 kg (136 lb 8 oz).           reports that he quit smoking about 47 years ago. His smoking use included cigarettes. He has never used smokeless tobacco.      Appropriate preventive services were discussed with this patient, including applicable screening as appropriate for cardiovascular disease, diabetes, osteopenia/osteoporosis, and glaucoma.  As appropriate for age/gender, discussed screening for colorectal cancer, prostate cancer, breast cancer, and cervical cancer. Checklist reviewing preventive services available has been given to the patient.    Reviewed patients plan of care and provided an AVS. The Basic Care Plan (routine screening as documented in Health Maintenance) for Donn meets the Care Plan requirement. This Care Plan has been established and reviewed with the Patient.    Counseling Resources:  ATP IV Guidelines  Pooled Cohorts Equation Calculator  Breast Cancer Risk Calculator  FRAX Risk Assessment  ICSI Preventive Guidelines  Dietary Guidelines for Americans, 2010  USDA's MyPlate  ASA Prophylaxis  Lung CA Screening    Ha Anderson MD  [facility]    Identified Health Risks:

## 2019-04-16 LAB
ALBUMIN SERPL-MCNC: 4 G/DL (ref 3.4–5)
ALP SERPL-CCNC: 100 U/L (ref 40–150)
ALT SERPL W P-5'-P-CCNC: 33 U/L (ref 0–70)
ANION GAP SERPL CALCULATED.3IONS-SCNC: 10 MMOL/L (ref 3–14)
AST SERPL W P-5'-P-CCNC: 25 U/L (ref 0–45)
BILIRUB SERPL-MCNC: 0.4 MG/DL (ref 0.2–1.3)
BUN SERPL-MCNC: 22 MG/DL (ref 7–30)
CALCIUM SERPL-MCNC: 8.9 MG/DL (ref 8.5–10.1)
CHLORIDE SERPL-SCNC: 105 MMOL/L (ref 94–109)
CHOLEST SERPL-MCNC: 141 MG/DL
CO2 SERPL-SCNC: 25 MMOL/L (ref 20–32)
CREAT SERPL-MCNC: 1.04 MG/DL (ref 0.66–1.25)
GFR SERPL CREATININE-BSD FRML MDRD: 70 ML/MIN/{1.73_M2}
GLUCOSE SERPL-MCNC: 90 MG/DL (ref 70–99)
HDLC SERPL-MCNC: 85 MG/DL
LDLC SERPL CALC-MCNC: 46 MG/DL
NONHDLC SERPL-MCNC: 56 MG/DL
POTASSIUM SERPL-SCNC: 4.1 MMOL/L (ref 3.4–5.3)
PROT SERPL-MCNC: 7.6 G/DL (ref 6.8–8.8)
PSA SERPL-ACNC: 2.74 UG/L (ref 0–4)
SODIUM SERPL-SCNC: 140 MMOL/L (ref 133–144)
TRIGL SERPL-MCNC: 51 MG/DL
TSH SERPL DL<=0.005 MIU/L-ACNC: 3.14 MU/L (ref 0.4–4)

## 2019-11-03 ENCOUNTER — TRANSFERRED RECORDS (OUTPATIENT)
Dept: HEALTH INFORMATION MANAGEMENT | Facility: CLINIC | Age: 74
End: 2019-11-03

## 2019-11-12 ENCOUNTER — OFFICE VISIT (OUTPATIENT)
Dept: FAMILY MEDICINE | Facility: CLINIC | Age: 74
End: 2019-11-12
Payer: MEDICARE

## 2019-11-12 VITALS
TEMPERATURE: 97.4 F | SYSTOLIC BLOOD PRESSURE: 139 MMHG | WEIGHT: 132.5 LBS | DIASTOLIC BLOOD PRESSURE: 70 MMHG | HEART RATE: 57 BPM | BODY MASS INDEX: 23.77 KG/M2

## 2019-11-12 DIAGNOSIS — S83.209A TEAR OF MENISCUS OF KNEE, UNSPECIFIED LATERALITY, UNSPECIFIED MENISCUS, UNSPECIFIED TEAR TYPE, UNSPECIFIED WHETHER OLD OR CURRENT TEAR, INITIAL ENCOUNTER: ICD-10-CM

## 2019-11-12 DIAGNOSIS — Z01.818 PREOP GENERAL PHYSICAL EXAM: Primary | ICD-10-CM

## 2019-11-12 LAB
BASOPHILS # BLD AUTO: 0.1 10E9/L (ref 0–0.2)
BASOPHILS NFR BLD AUTO: 0.8 %
CREAT SERPL-MCNC: 0.89 MG/DL (ref 0.66–1.25)
DIFFERENTIAL METHOD BLD: ABNORMAL
EOSINOPHIL # BLD AUTO: 0.3 10E9/L (ref 0–0.7)
EOSINOPHIL NFR BLD AUTO: 4.9 %
ERYTHROCYTE [DISTWIDTH] IN BLOOD BY AUTOMATED COUNT: 12.8 % (ref 10–15)
GFR SERPL CREATININE-BSD FRML MDRD: 84 ML/MIN/{1.73_M2}
HCT VFR BLD AUTO: 39.9 % (ref 40–53)
HGB BLD-MCNC: 12.9 G/DL (ref 13.3–17.7)
LYMPHOCYTES # BLD AUTO: 2 10E9/L (ref 0.8–5.3)
LYMPHOCYTES NFR BLD AUTO: 30.8 %
MCH RBC QN AUTO: 31.1 PG (ref 26.5–33)
MCHC RBC AUTO-ENTMCNC: 32.3 G/DL (ref 31.5–36.5)
MCV RBC AUTO: 96 FL (ref 78–100)
MONOCYTES # BLD AUTO: 0.5 10E9/L (ref 0–1.3)
MONOCYTES NFR BLD AUTO: 7.6 %
NEUTROPHILS # BLD AUTO: 3.7 10E9/L (ref 1.6–8.3)
NEUTROPHILS NFR BLD AUTO: 55.9 %
PLATELET # BLD AUTO: 231 10E9/L (ref 150–450)
POTASSIUM SERPL-SCNC: 4.2 MMOL/L (ref 3.4–5.3)
RBC # BLD AUTO: 4.15 10E12/L (ref 4.4–5.9)
WBC # BLD AUTO: 6.6 10E9/L (ref 4–11)

## 2019-11-12 PROCEDURE — 99215 OFFICE O/P EST HI 40 MIN: CPT | Performed by: FAMILY MEDICINE

## 2019-11-12 PROCEDURE — 82565 ASSAY OF CREATININE: CPT | Performed by: FAMILY MEDICINE

## 2019-11-12 PROCEDURE — 36415 COLL VENOUS BLD VENIPUNCTURE: CPT | Performed by: FAMILY MEDICINE

## 2019-11-12 PROCEDURE — 93010 ELECTROCARDIOGRAM REPORT: CPT | Performed by: INTERNAL MEDICINE

## 2019-11-12 PROCEDURE — 84132 ASSAY OF SERUM POTASSIUM: CPT | Performed by: FAMILY MEDICINE

## 2019-11-12 PROCEDURE — 85025 COMPLETE CBC W/AUTO DIFF WBC: CPT | Performed by: FAMILY MEDICINE

## 2019-11-12 PROCEDURE — 93005 ELECTROCARDIOGRAM TRACING: CPT | Performed by: FAMILY MEDICINE

## 2019-11-12 ASSESSMENT — PAIN SCALES - GENERAL: PAINLEVEL: NO PAIN (0)

## 2019-11-12 NOTE — PROGRESS NOTES
[address] [address]  [phone]  Dept: [phone]    PRE-OP EVALUATION:  Today's date: 2019    Donn Allen (: 1945) presents for pre-operative evaluation assessment as requested by Dr. Ochoa.  He requires evaluation and anesthesia risk assessment prior to undergoing surgery/procedure for treatment of left knee surgery .    Proposed Surgery/ Procedure: Left knee  Date of Surgery/ Procedure: 2019  Time of Surgery/ Procedure:   Hospital/Surgical Facility: [facility]  Fax number for surgical facility:   Primary Physician: Ha Anderson  Type of Anesthesia Anticipated: General    Patient has a Health Care Directive or Living Will:  YES     1. YES - DO YOU HAVE A HISTORY OF HEART ATTACK, STROKE, STENT, BYPASS OR SURGERY ON AN ARTERY IN THE HEAD, NECK, HEART OR LEG? Stents in heart , no problems since  2. NO - Do you ever have any pain or discomfort in your chest?  3. NO - Do you have a history of  Heart Failure?  4. NO - Are you troubled by shortness of breath when: walking on the level, up a slight hill or at night?  5. NO - Do you currently have a cold, bronchitis or other respiratory infection?  6. NO - Do you have a cough, shortness of breath or wheezing?  7. YES - DO YOU SOMETIMES GET PAINS IN THE CALVES OF YOUR LEGS WHEN YOU WALK? Due to knee pain  8. NO - Do you or anyone in your family have previous history of blood clots?  9. NO - Do you or does anyone in your family have a serious bleeding problem such as prolonged bleeding following surgeries or cuts?  10. NO - Have you ever had problems with anemia or been told to take iron pills?  11. NO - Have you had any abnormal blood loss such as black, tarry or bloody stools, or abnormal vaginal bleeding?  12. NO - Have you ever had a blood transfusion?  13. NO - Have you or any of your relatives ever had problems with anesthesia?  14.  NO - Do you have sleep apnea, excessive snoring or daytime drowsiness?  15. NO - Do you have any prosthetic heart valves?  16. NO - Do you have prosthetic joints?  17. NO - Is there any chance that you may be pregnant?      HPI:     HPI related to upcoming procedure: 74 year old male with left knee meniscus tear.  To have arthroscopy.           MEDICAL HISTORY:     Patient Active Problem List    Diagnosis Date Noted     Aphakia of right eye 03/28/2018     Priority: Medium     Pseudophakia, os 11/13/2017     Priority: Medium     Hypertension goal BP (blood pressure) < 140/90 01/05/2017     Priority: Medium     Gastroesophageal reflux disease, esophagitis presence not specified 04/26/2016     Priority: Medium     Lightheadedness 03/11/2015     Priority: Medium     Legal blindness, od 01/31/2015     Priority: Medium     Glaucoma suspect 01/31/2015     Priority: Medium     Target IOP:   Max IOP : 16/16  Family history: No  Trauma history: No  OCT: 12/2016 thin S&I right eye, within normal limits left eye   Hurt visual field (HVF): 12/2016   Right eye - Reliable, sup>inf arcuate defect (similar to 2006)   Left eye - Reliable, Normal  Pachymetry: Average 558/559  C/D: 0.9/0.4  SLT:            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  He was accompanied by Carolyn, his wife, who is the health care agent.  Previous ACP discussions reviewed and questions answered. At this time he came in with a completed directive  that we reviewed as an up to date and legal document. Confirmed/documented designated decision maker(s). See permanent comments of demographics in clinical tab. Confirmed current code status reflects current choices. View document(s) and details by clicking on code status.    Added by Meme Cota on 4/4/2014            Patient states has Advance Directive and will bring in a copy to clinic. 1/11/2012   Padmini Reynaga CMA          Past Medical History:   Diagnosis Date     Aphakia      Arthritis      CAD S/P percutaneous coronary angioplasty 11/4/2014     Combined form of age-related cataract, left eye 10/24/2017     Coronary artery disease      Environmental allergies      Hypertension goal BP (blood pressure) < 140/90 1/5/2017     Lightheadedness 3/11/2015     Retinal detachment 1990s    right eye     Strabismus      Past Surgical History:   Procedure Laterality Date     C FOOT/TOES SURGERY PROC UNLISTED Right 1982    trauma     C STOMACH SURGERY PROCEDURE UNLISTED  1963    Partial gastrectomy-h/o ulcers     CARDIAC CATHERIZATION  Nov 2014    2 stents      CATARACT IOL, RT/LT       COLONOSCOPY  3/18/2014    Procedure: COLONOSCOPY;  COLONOSCOPY SCREEN/ EGD UPPER GI ENDOSCOPY/ LOSS OF WEIGHT/ DEAL;  Surgeon: Nick Underwood MD;  Location: [facility] OR     DESTRUC RETINAL LESN,CRYOTHERAPY Right 1994     ESOPHAGOSCOPY, GASTROSCOPY, DUODENOSCOPY (EGD), COMBINED  3/18/2014    Procedure: COMBINED ESOPHAGOSCOPY, GASTROSCOPY, DUODENOSCOPY (EGD), BIOPSY SINGLE OR MULTIPLE;;  Surgeon: Nick Underwood MD;  Location: [facility] OR     PHACOEMULSIFICATION WITH STANDARD INTRAOCULAR LENS IMPLANT  11/2017    left eye     RETINAL REATTACHMENT       ROTATOR CUFF REPAIR RT/LT Bilateral 1985     VASECTOMY       Current Outpatient Medications   Medication Sig Dispense Refill     aspirin 81 MG tablet Take 1 tablet by mouth daily.       atorvastatin (LIPITOR) 40 MG tablet Take 1 tablet (40 mg) by mouth daily 90 tablet 3      lisinopril (PRINIVIL/ZESTRIL) 5 MG tablet Take 1 tablet (5 mg) by mouth daily 90 tablet 3     omeprazole (PRILOSEC) 20 MG DR capsule TAKE ONE CAPSULE BY MOUTH ONCE DAILY 90 capsule 3     polyethylene glycol (MIRALAX/GLYCOLAX) powder Take 17 g (1 capful) by mouth 3 times daily as needed for constipation 225 g 1     OTC products: None, except as noted above    Allergies   Allergen Reactions     Ibuprofen Sodium      ibu     Tetanus Toxoid       Latex Allergy: NO    Social History     Tobacco Use     Smoking status: Former Smoker     Types: Cigarettes     Last attempt to quit: 1972     Years since quittin.8     Smokeless tobacco: Never Used     Tobacco comment: non-smoking household   Substance Use Topics     Alcohol use: Yes     Comment: occ beer / wine     History   Drug Use No       REVIEW OF SYSTEMS:   Constitutional, neuro, ENT, endocrine, pulmonary, cardiac, gastrointestinal, genitourinary, musculoskeletal, integument and psychiatric systems are negative, except as otherwise noted.    No recent illnesses    Patient also seeing orthopedics for the shoulder        EXAM:   /70 (BP Location: Right arm, Patient Position: Chair, Cuff Size: Adult Regular)   Pulse 57   Temp 97.4  F (36.3  C) (Oral)   Wt 60.1 kg (132 lb 8 oz)   BMI 23.77 kg/m      GENERAL APPEARANCE: healthy, alert and no distress     EYES: EOMI,  PERRL     HENT: ear canals and TM's normal and nose and mouth without ulcers or lesions     NECK: no adenopathy, no asymmetry, masses, or scars and thyroid normal to palpation     RESP: lungs clear to auscultation - no rales, rhonchi or wheezes     CV: regular rates and rhythm, normal S1 S2, no S3 or S4 and no murmur, click or rub     ABDOMEN:  soft, nontender, no HSM or masses and bowel sounds normal     MS: extremities normal- no gross deformities noted, no evidence of inflammation in joints, FROM in all extremities.     SKIN: no suspicious lesions or rashes     NEURO: Normal strength and  tone, sensory exam grossly normal, mentation intact and speech normal     PSYCH: mentation appears normal. and affect normal/bright     LYMPHATICS: No cervical adenopathy    DIAGNOSTICS:      ekg done today.  No change from 2014.  No ischemia seen.    Some labs pending today.      Recent Labs   Lab Test 04/15/19  1026 02/05/18  1115  11/10/14  0737   HGB 12.9* 12.9*   < > 13.5    218   < > 202    138   < > 139   POTASSIUM 4.1 4.7   < > 4.4   CR 1.04 0.82   < > 1.08   A1C  --  5.5  --  5.7    < > = values in this interval not displayed.        IMPRESSION:   Reason for surgery/procedure: 74 year old with left knee meniscus tear  Diagnosis/reason for consult: preop clearance    The proposed surgical procedure is considered INTERMEDIATE risk.    REVISED CARDIAC RISK INDEX  The patient has the following serious cardiovascular risks for perioperative complications such as (MI, PE, VFib and 3  AV Block):  Coronary Artery Disease (MI, positive stress test, angina, Qs on EKG)  INTERPRETATION: 1 risks: Class II (low risk - 0.9% complication rate)    The patient has the following additional risks for perioperative complications:  No identified additional risks      ICD-10-CM    1. Preop general physical exam Z01.818        RECOMMENDATIONS:          --Patient is to take all scheduled medications on the day of surgery EXCEPT for modifications listed below.    Patient to hold aspirin for one week prior.    Patient will hold the lisinopril the am of surgery as per usual anesthesia advice.    Of note, his blood pressure is usually well controlled.        APPROVAL GIVEN to proceed with proposed procedure, without further diagnostic evaluation, providing labs are okay.    Patient has remote history of two cardiac stents 5 years ago but no problems since.  He is very active physically.  No suspicious symptoms at all.    No bleeding / clotting problems.  Hemoglobin tends to run a little bit low, last reading 12.9.    No  history of anesthesia problems.     Patient        Signed Electronically by: Ha Anedrson MD    Copy of this evaluation report is provided to requesting physician.    [facility] Preop Guidelines    Revised Cardiac Risk Index

## 2019-11-12 NOTE — PATIENT INSTRUCTIONS
Before Your Surgery      Call your surgeon if there is any change in your health. This includes signs of a cold or flu (such as a sore throat, runny nose, cough, rash or fever).    Do not smoke, drink alcohol or take over the counter medicine (unless your surgeon or primary care doctor tells you to) for the 24 hours before and after surgery.    If you take prescribed drugs: Follow your doctor s orders about which medicines to take and which to stop until after surgery.    Eating and drinking prior to surgery: follow the instructions from your surgeon    Take a shower or bath the night before surgery. Use the soap your surgeon gave you to gently clean your skin. If you do not have soap from your surgeon, use your regular soap. Do not shave or scrub the surgery site.  Wear clean pajamas and have clean sheets on your bed.         Hold aspirin for a week prior    Hold lisinopril am of procedure    Skip all meds am of procedure    We will send you lab results

## 2019-11-12 NOTE — LETTER
[facility]   [address] [place]  [address]  [phone]                                   November 14, 2019    Donn Allen  [address] [facility] [address]        Dear Donn,    Your pre-op labs are stable.    Results for orders placed or performed in visit on 11/12/19   Creatinine     Status: None   Result Value Ref Range    Creatinine 0.89 0.66 - 1.25 mg/dL    GFR Estimate 84 >60 mL/min/[1.73_m2]    GFR Estimate If Black >90 >60 mL/min/[1.73_m2]   CBC with platelets differential     Status: Abnormal   Result Value Ref Range    WBC 6.6 4.0 - 11.0 10e9/L    RBC Count 4.15 (L) 4.4 - 5.9 10e12/L    Hemoglobin 12.9 (L) 13.3 - 17.7 g/dL    Hematocrit 39.9 (L) 40.0 - 53.0 %    MCV 96 78 - 100 fl    MCH 31.1 26.5 - 33.0 pg    MCHC 32.3 31.5 - 36.5 g/dL    RDW 12.8 10.0 - 15.0 %    Platelet Count 231 150 - 450 10e9/L    % Neutrophils 55.9 %    % Lymphocytes 30.8 %    % Monocytes 7.6 %    % Eosinophils 4.9 %    % Basophils 0.8 %    Absolute Neutrophil 3.7 1.6 - 8.3 10e9/L    Absolute Lymphocytes 2.0 0.8 - 5.3 10e9/L    Absolute Monocytes 0.5 0.0 - 1.3 10e9/L    Absolute Eosinophils 0.3 0.0 - 0.7 10e9/L    Absolute Basophils 0.1 0.0 - 0.2 10e9/L    Diff Method Automated Method    Potassium     Status: None   Result Value Ref Range    Potassium 4.2 3.4 - 5.3 mmol/L       If you have any questions please call the clinic at [phone]    Sincerely,    Ha Anderson MD  bmd

## 2019-11-14 NOTE — RESULT ENCOUNTER NOTE
Your pre-op labs are stable.    Ha Anderson MD    [facility] - please fax to JESUS garcia  Thanks  Ha Anderson MD

## 2019-12-06 ENCOUNTER — OFFICE VISIT (OUTPATIENT)
Dept: FAMILY MEDICINE | Facility: CLINIC | Age: 74
End: 2019-12-06
Payer: MEDICARE

## 2019-12-06 VITALS
TEMPERATURE: 97.3 F | DIASTOLIC BLOOD PRESSURE: 53 MMHG | HEART RATE: 66 BPM | WEIGHT: 135 LBS | OXYGEN SATURATION: 99 % | BODY MASS INDEX: 24.22 KG/M2 | SYSTOLIC BLOOD PRESSURE: 136 MMHG

## 2019-12-06 DIAGNOSIS — Z01.818 PREOP GENERAL PHYSICAL EXAM: Primary | ICD-10-CM

## 2019-12-06 PROCEDURE — 99215 OFFICE O/P EST HI 40 MIN: CPT | Performed by: FAMILY MEDICINE

## 2019-12-06 ASSESSMENT — PAIN SCALES - GENERAL: PAINLEVEL: NO PAIN (0)

## 2019-12-06 NOTE — PATIENT INSTRUCTIONS
Before Your Surgery      Call your surgeon if there is any change in your health. This includes signs of a cold or flu (such as a sore throat, runny nose, cough, rash or fever).    Do not smoke, drink alcohol or take over the counter medicine (unless your surgeon or primary care doctor tells you to) for the 24 hours before and after surgery.    If you take prescribed drugs: Follow your doctor s orders about which medicines to take and which to stop until after surgery.    Eating and drinking prior to surgery: follow the instructions from your surgeon    Take a shower or bath the night before surgery. Use the soap your surgeon gave you to gently clean your skin. If you do not have soap from your surgeon, use your regular soap. Do not shave or scrub the surgery site.  Wear clean pajamas and have clean sheets on your bed.           Take the full aspirin for one more week, then hold for 6 days until after procedure.  Resume full aspirin after procedure, take for 3-4 weeks, then back to 81 mg aspirin.    Hold lisinoril am of procedure

## 2019-12-06 NOTE — PROGRESS NOTES
[address] [address] [phone]  Dept: [phone]    PRE-OP EVALUATION:  Today's date: 2019    Donn Allen (: 1945) presents for pre-operative evaluation assessment as requested by Dr. Juares.  He requires evaluation and anesthesia risk assessment prior to undergoing surgery/procedure for treatment of Shoulder repair .    Fax number for surgical facility: [facility] fax #[phone]  Primary Physician: Ha Anderson  Type of Anesthesia Anticipated: General    Patient has a Health Care Directive or Living Will:  YES     Preop Questions 2019   Who is doing your surgery? Dr Juares   What are you having done? shoulder repair   Date of Surgery/Procedure:    Facility or Hospital where procedure/surgery will be performed: [facility]   1.  Do you have a history of Heart attack, stroke, stent, coronary bypass surgery, or other heart surgery? YES  - 2 stents coronary    2.  Do you ever have any pain or discomfort in your chest? No   3.  Do you have a history of  Heart Failure? No   4.   Are you troubled by shortness of breath when:  walking on a level surface, or up a slight hill, or at night? No   5.  Do you currently have a cold, bronchitis or other respiratory infection? No   6.  Do you have a cough, shortness of breath, or wheezing? No   7.  Do you sometimes get pains in the calves of your legs when you walk? No   8. Do you or anyone in your family have previous history of blood clots? No   9.  Do you or does anyone in your family have a serious bleeding problem such as prolonged bleeding following surgeries or cuts? No   10. Have you ever had problems with anemia or been told to take iron pills? No   11. Have you had any abnormal blood loss such as black, tarry or bloody stools? No   12. Have you ever had a blood transfusion? No   13. Have you or any of your relatives ever had problems with anesthesia? No    14. Do you have sleep apnea, excessive snoring or daytime drowsiness? No   15. Do you have any prosthetic heart valves? No   16. Do you have prosthetic joints? No         HPI:     HPI related to upcoming procedure: 74 year old male to have left shoulder procedure.  Bone spurs.  Per orthopedics.            MEDICAL HISTORY:     Patient Active Problem List    Diagnosis Date Noted     Aphakia of right eye 03/28/2018     Priority: Medium     Pseudophakia, os 11/13/2017     Priority: Medium     Hypertension goal BP (blood pressure) < 140/90 01/05/2017     Priority: Medium     Gastroesophageal reflux disease, esophagitis presence not specified 04/26/2016     Priority: Medium     Lightheadedness 03/11/2015     Priority: Medium     Legal blindness, od 01/31/2015     Priority: Medium     Glaucoma suspect 01/31/2015     Priority: Medium     Target IOP:   Max IOP : 16/16  Family history: No  Trauma history: No  OCT: 12/2016 thin S&I right eye, within normal limits left eye   Hurt visual field (HVF): 12/2016   Right eye - Reliable, sup>inf arcuate defect (similar to 2006)   Left eye - Reliable, Normal  Pachymetry: Average 558/559  C/D: 0.9/0.4  SLT:            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  He was accompanied by Carolyn, his wife, who is the health care agent.  Previous ACP discussions reviewed and questions answered. At this time he came in with a completed directive that we reviewed as an up to date and  legal document. Confirmed/documented designated decision maker(s). See permanent comments of demographics in clinical tab. Confirmed current code status reflects current choices. View document(s) and details by clicking on code status.    Added by Meme Cota on 4/4/2014            Patient states has Advance Directive and will bring in a copy to clinic. 1/11/2012   Padmini Reynaga CMA          Past Medical History:   Diagnosis Date     Aphakia      Arthritis      CAD S/P percutaneous coronary angioplasty 11/4/2014     Combined form of age-related cataract, left eye 10/24/2017     Coronary artery disease      Environmental allergies      Hypertension goal BP (blood pressure) < 140/90 1/5/2017     Lightheadedness 3/11/2015     Retinal detachment 1990s    right eye     Strabismus      Past Surgical History:   Procedure Laterality Date     C FOOT/TOES SURGERY PROC UNLISTED Right 1982    trauma     C STOMACH SURGERY PROCEDURE UNLISTED  1963    Partial gastrectomy-h/o ulcers     CARDIAC CATHERIZATION  Nov 2014    2 stents      CATARACT IOL, RT/LT       COLONOSCOPY  3/18/2014    Procedure: COLONOSCOPY;  COLONOSCOPY SCREEN/ EGD UPPER GI ENDOSCOPY/ LOSS OF WEIGHT/ DEAL;  Surgeon: Nick Underwood MD;  Location: [facility] OR     DESTRUC RETINAL LESN,CRYOTHERAPY Right 1994     ESOPHAGOSCOPY, GASTROSCOPY, DUODENOSCOPY (EGD), COMBINED  3/18/2014    Procedure: COMBINED ESOPHAGOSCOPY, GASTROSCOPY, DUODENOSCOPY (EGD), BIOPSY SINGLE OR MULTIPLE;;  Surgeon: Nick Underwood MD;  Location: [facility] OR     PHACOEMULSIFICATION WITH STANDARD INTRAOCULAR LENS IMPLANT  11/2017    left eye     RETINAL REATTACHMENT       ROTATOR CUFF REPAIR RT/LT Bilateral 1985     VASECTOMY     left knee scope November 2019      Current Outpatient Medications   Medication Sig Dispense Refill     aspirin 81 MG tablet Take 1 tablet by mouth daily.       atorvastatin (LIPITOR) 40 MG tablet Take 1 tablet (40 mg) by mouth daily 90 tablet 3      lisinopril (PRINIVIL/ZESTRIL) 5 MG tablet Take 1 tablet (5 mg) by mouth daily 90 tablet 3     omeprazole (PRILOSEC) 20 MG DR capsule TAKE ONE CAPSULE BY MOUTH ONCE DAILY 90 capsule 3     polyethylene glycol (MIRALAX/GLYCOLAX) powder Take 17 g (1 capful) by mouth 3 times daily as needed for constipation 225 g 1   to be on one full aspirin for 21 days since knee surgery       OTC products: none    Allergies   Allergen Reactions     Ibuprofen Sodium      ibu     Tetanus Toxoid       Latex Allergy: NO    Social History     Tobacco Use     Smoking status: Former Smoker     Types: Cigarettes     Last attempt to quit: 1972     Years since quittin.9     Smokeless tobacco: Never Used     Tobacco comment: non-smoking household   Substance Use Topics     Alcohol use: Yes     Comment: occ beer / wine     History   Drug Use No       REVIEW OF SYSTEMS:   Constitutional, neuro, ENT, endocrine, pulmonary, cardiac, gastrointestinal, genitourinary, musculoskeletal, integument and psychiatric systems are negative, except as otherwise noted.\    No recent illnesses    No heart problems since stents in     No chest or breathing problems    Just had the left knee scope for meniscus a little over a week ago    EXAM:   There were no vitals taken for this visit.    GENERAL APPEARANCE: healthy, alert and no distress     EYES: EOMI,  PERRL     HENT: ear canals and TM's normal and nose and mouth without ulcers or lesions     NECK: no adenopathy, no asymmetry, masses, or scars and thyroid normal to palpation     RESP: lungs clear to auscultation - no rales, rhonchi or wheezes     CV: regular rates and rhythm, normal S1 S2, no S3 or S4 and no murmur, click or rub     ABDOMEN:  soft, nontender, no HSM or masses and bowel sounds normal     MS: extremities normal- no gross deformities noted, no evidence of inflammation in joints, FROM in all extremities.     SKIN: no suspicious lesions or rashes     NEURO: Normal strength and tone,  sensory exam grossly normal, mentation intact and speech normal     PSYCH: mentation appears normal. and affect normal/bright     LYMPHATICS: No cervical adenopathy    DIAGNOSTICS:    see copies of recent ekg and labs    Recent Labs   Lab Test 11/12/19  1122 04/15/19  1026 02/05/18  1115  11/10/14  0737   HGB 12.9* 12.9* 12.9*   < > 13.5    217 218   < > 202   NA  --  140 138   < > 139   POTASSIUM 4.2 4.1 4.7   < > 4.4   CR 0.89 1.04 0.82   < > 1.08   A1C  --   --  5.5  --  5.7    < > = values in this interval not displayed.        IMPRESSION:   Reason for surgery/procedure: left shoulder procedure next week per orthopedics   Diagnosis/reason for consult: pre-op clearance    The proposed surgical procedure is considered INTERMEDIATE risk.    REVISED CARDIAC RISK INDEX  The patient has the following serious cardiovascular risks for perioperative complications such as (MI, PE, VFib and 3  AV Block):  Coronary Artery Disease (MI, positive stress test, angina, Qs on EKG)  INTERPRETATION: 1 risks: Class II (low risk - 0.9% complication rate)    The patient has the following additional risks for perioperative complications:  No identified additional risks      ICD-10-CM    1. Preop general physical exam Z01.818        RECOMMENDATIONS:          --Patient is to take all scheduled medications on the day of surgery EXCEPT for modifications listed below.    Patient just had knee scope 9 days ago.  Was told to take one full aspirin daily after that.  Normally takes the 81 mg asa.    Since he has this shoulder procedure coming up on Dec 19, he will take the full aspirin for another week then hold aspirin from the 14th until after procedure    Also hold lisinopril day of surgery    Remote history of stents 2014 but no heart problems since than    No history of anesthesia problems    No cardiac type symptoms    No bleeding or clotting problems        APPROVAL GIVEN to proceed with proposed procedure, without further  diagnostic evaluation       Signed Electronically by: Ha Anderson MD    Copy of this evaluation report is provided to requesting physician.    [facility] Preop Guidelines    Revised Cardiac Risk Index

## 2019-12-10 ENCOUNTER — TRANSFERRED RECORDS (OUTPATIENT)
Dept: HEALTH INFORMATION MANAGEMENT | Facility: CLINIC | Age: 74
End: 2019-12-10

## 2019-12-16 ENCOUNTER — TRANSFERRED RECORDS (OUTPATIENT)
Dept: HEALTH INFORMATION MANAGEMENT | Facility: CLINIC | Age: 74
End: 2019-12-16

## 2019-12-31 ENCOUNTER — TRANSFERRED RECORDS (OUTPATIENT)
Dept: HEALTH INFORMATION MANAGEMENT | Facility: CLINIC | Age: 74
End: 2019-12-31

## 2020-01-28 ENCOUNTER — TRANSFERRED RECORDS (OUTPATIENT)
Dept: HEALTH INFORMATION MANAGEMENT | Facility: CLINIC | Age: 75
End: 2020-01-28

## 2020-03-06 ENCOUNTER — TRANSFERRED RECORDS (OUTPATIENT)
Dept: HEALTH INFORMATION MANAGEMENT | Facility: CLINIC | Age: 75
End: 2020-03-06

## 2020-04-21 DIAGNOSIS — K21.9 GASTROESOPHAGEAL REFLUX DISEASE, ESOPHAGITIS PRESENCE NOT SPECIFIED: ICD-10-CM

## 2020-04-21 DIAGNOSIS — I10 HYPERTENSION GOAL BP (BLOOD PRESSURE) < 140/90: ICD-10-CM

## 2020-04-21 DIAGNOSIS — E78.5 HYPERLIPIDEMIA LDL GOAL <70: ICD-10-CM

## 2020-04-21 NOTE — TELEPHONE ENCOUNTER
"Requested Prescriptions   Pending Prescriptions Disp Refills     omeprazole (PRILOSEC) 20 MG DR capsule 90 capsule 3     Sig: TAKE ONE CAPSULE BY MOUTH ONCE DAILY   Last Written Prescription Date:  4/15/19  Last Fill Quantity: 90,  # refills: 3   Last office visit: 12/6/2019 with prescribing provider:     Future Office Visit:        PPI Protocol Passed - 4/21/2020 12:04 PM        Passed - Not on Clopidogrel (unless Pantoprazole ordered)        Passed - No diagnosis of osteoporosis on record        Passed - Recent (12 mo) or future (30 days) visit within the authorizing provider's specialty     Patient has had an office visit with the authorizing provider or a provider within the authorizing providers department within the previous 12 mos or has a future within next 30 days. See \"Patient Info\" tab in inbasket, or \"Choose Columns\" in Meds & Orders section of the refill encounter.              Passed - Medication is active on med list        Passed - Patient is age 18 or older             "

## 2020-04-21 NOTE — TELEPHONE ENCOUNTER
"Requested Prescriptions   Pending Prescriptions Disp Refills     lisinopril (ZESTRIL) 5 MG tablet 90 tablet 3     Sig: Take 1 tablet (5 mg) by mouth daily   Last Written Prescription Date:  4/15/19  Last Fill Quantity: 90,  # refills: 3   Last office visit: 12/6/2019 with prescribing provider:     Future Office Visit:      ACE Inhibitors (Including Combos) Protocol Passed - 4/21/2020 12:01 PM        Passed - Blood pressure under 140/90 in past 12 months     BP Readings from Last 3 Encounters:   12/06/19 136/53   11/12/19 139/70   04/15/19 125/66                 Passed - Recent (12 mo) or future (30 days) visit within the authorizing provider's specialty     Patient has had an office visit with the authorizing provider or a provider within the authorizing providers department within the previous 12 mos or has a future within next 30 days. See \"Patient Info\" tab in inbasket, or \"Choose Columns\" in Meds & Orders section of the refill encounter.              Passed - Medication is active on med list        Passed - Patient is age 18 or older        Passed - Normal serum creatinine on file in past 12 months     Recent Labs   Lab Test 11/12/19  1122  01/07/15  0955   CR 0.89   < >  --    CRPOC  --   --  1.1    < > = values in this interval not displayed.       Ok to refill medication if creatinine is low          Passed - Normal serum potassium on file in past 12 months     Recent Labs   Lab Test 11/12/19  1122   POTASSIUM 4.2                  "

## 2020-04-21 NOTE — TELEPHONE ENCOUNTER
"Requested Prescriptions   Pending Prescriptions Disp Refills     atorvastatin (LIPITOR) 40 MG tablet 90 tablet 3     Sig: Take 1 tablet (40 mg) by mouth daily   Last Written Prescription Date:  4/15/19  Last Fill Quantity: 90,  # refills: 3   Last office visit: 12/6/2019 with prescribing provider:     Future Office Visit:      Statins Protocol Failed - 4/21/2020 11:58 AM        Failed - LDL on file in past 12 months     Recent Labs   Lab Test 04/15/19  1026   LDL 46             Passed - No abnormal creatine kinase in past 12 months     Recent Labs   Lab Test 03/03/15  0956   *                Passed - Recent (12 mo) or future (30 days) visit within the authorizing provider's specialty     Patient has had an office visit with the authorizing provider or a provider within the authorizing providers department within the previous 12 mos or has a future within next 30 days. See \"Patient Info\" tab in inbasket, or \"Choose Columns\" in Meds & Orders section of the refill encounter.              Passed - Medication is active on med list        Passed - Patient is age 18 or older             "

## 2020-04-22 RX ORDER — LISINOPRIL 5 MG/1
5 TABLET ORAL DAILY
Qty: 90 TABLET | Refills: 0 | Status: SHIPPED | OUTPATIENT
Start: 2020-04-22 | End: 2020-07-15

## 2020-04-22 RX ORDER — ATORVASTATIN CALCIUM 40 MG/1
40 TABLET, FILM COATED ORAL DAILY
Qty: 90 TABLET | Refills: 1 | Status: SHIPPED | OUTPATIENT
Start: 2020-04-22 | End: 2020-10-08

## 2020-04-22 NOTE — TELEPHONE ENCOUNTER
Prescription approved per [facility] Refill Protocol.  Janessa Jerez, PharmD  Medication Therapy Management Pharmacist  [phone]

## 2020-10-08 DIAGNOSIS — K21.9 GASTROESOPHAGEAL REFLUX DISEASE: ICD-10-CM

## 2020-10-08 DIAGNOSIS — E78.5 HYPERLIPIDEMIA LDL GOAL <70: ICD-10-CM

## 2020-10-08 DIAGNOSIS — I10 HYPERTENSION GOAL BP (BLOOD PRESSURE) < 140/90: ICD-10-CM

## 2020-10-08 RX ORDER — ATORVASTATIN CALCIUM 40 MG/1
TABLET, FILM COATED ORAL
Qty: 90 TABLET | Refills: 0 | Status: SHIPPED | OUTPATIENT
Start: 2020-10-08 | End: 2021-01-07

## 2020-10-08 RX ORDER — LISINOPRIL 5 MG/1
TABLET ORAL
Qty: 90 TABLET | Refills: 0 | Status: SHIPPED | OUTPATIENT
Start: 2020-10-08 | End: 2021-01-14

## 2020-10-08 NOTE — TELEPHONE ENCOUNTER
"    Requested Prescriptions   Pending Prescriptions Disp Refills     lisinopril (ZESTRIL) 5 MG tablet [Pharmacy Med Name: LISINOPRIL 5 MG TABLET] 90 tablet 0     Sig: TAKE 1 TABLET BY MOUTH EVERY DAY       ACE Inhibitors (Including Combos) Protocol Passed - 10/8/2020  1:28 AM        Passed - Blood pressure under 140/90 in past 12 months     BP Readings from Last 3 Encounters:   12/06/19 136/53   11/12/19 139/70   04/15/19 125/66                 Passed - Recent (12 mo) or future (30 days) visit within the authorizing provider's specialty     Patient has had an office visit with the authorizing provider or a provider within the authorizing providers department within the previous 12 mos or has a future within next 30 days. See \"Patient Info\" tab in inbasket, or \"Choose Columns\" in Meds & Orders section of the refill encounter.              Passed - Medication is active on med list        Passed - Patient is age 18 or older        Passed - Normal serum creatinine on file in past 12 months     Recent Labs   Lab Test 11/12/19  1122 01/07/15  0955 01/07/15  0955   CR 0.89   < >  --    CRPOC  --   --  1.1    < > = values in this interval not displayed.       Ok to refill medication if creatinine is low          Passed - Normal serum potassium on file in past 12 months     Recent Labs   Lab Test 11/12/19  1122   POTASSIUM 4.2                omeprazole (PRILOSEC) 20 MG DR capsule [Pharmacy Med Name: OMEPRAZOLE DR 20 MG CAPSULE] 90 capsule 0     Sig: TAKE 1 CAPSULE BY MOUTH EVERY DAY       PPI Protocol Passed - 10/8/2020  1:28 AM        Passed - Not on Clopidogrel (unless Pantoprazole ordered)        Passed - No diagnosis of osteoporosis on record        Passed - Recent (12 mo) or future (30 days) visit within the authorizing provider's specialty     Patient has had an office visit with the authorizing provider or a provider within the authorizing providers department within the previous 12 mos or has a future within next 30 " "days. See \"Patient Info\" tab in inbasket, or \"Choose Columns\" in Meds & Orders section of the refill encounter.              Passed - Medication is active on med list        Passed - Patient is age 18 or older           atorvastatin (LIPITOR) 40 MG tablet [Pharmacy Med Name: ATORVASTATIN 40 MG TABLET] 90 tablet 1     Sig: TAKE 1 TABLET BY MOUTH EVERY DAY       Statins Protocol Failed - 10/8/2020  1:28 AM        Failed - LDL on file in past 12 months     Recent Labs   Lab Test 04/15/19  1026   LDL 46             Passed - No abnormal creatine kinase in past 12 months     Recent Labs   Lab Test 03/03/15  0956   *                Passed - Recent (12 mo) or future (30 days) visit within the authorizing provider's specialty     Patient has had an office visit with the authorizing provider or a provider within the authorizing providers department within the previous 12 mos or has a future within next 30 days. See \"Patient Info\" tab in inbasket, or \"Choose Columns\" in Meds & Orders section of the refill encounter.              Passed - Medication is active on med list        Passed - Patient is age 18 or older               Prescription approved per [facility] Refill Protocol.    Mary Burch RN    "

## 2020-11-17 ENCOUNTER — OFFICE VISIT (OUTPATIENT)
Dept: FAMILY MEDICINE | Facility: CLINIC | Age: 75
End: 2020-11-17
Payer: MEDICARE

## 2020-11-17 VITALS
SYSTOLIC BLOOD PRESSURE: 129 MMHG | BODY MASS INDEX: 24.69 KG/M2 | TEMPERATURE: 97.4 F | HEART RATE: 66 BPM | DIASTOLIC BLOOD PRESSURE: 71 MMHG | WEIGHT: 137.6 LBS | OXYGEN SATURATION: 98 %

## 2020-11-17 DIAGNOSIS — Z23 NEED FOR PROPHYLACTIC VACCINATION AND INOCULATION AGAINST INFLUENZA: ICD-10-CM

## 2020-11-17 DIAGNOSIS — S61.213A LACERATION OF LEFT MIDDLE FINGER WITHOUT FOREIGN BODY WITHOUT DAMAGE TO NAIL, INITIAL ENCOUNTER: Primary | ICD-10-CM

## 2020-11-17 PROCEDURE — G0008 ADMIN INFLUENZA VIRUS VAC: HCPCS | Performed by: FAMILY MEDICINE

## 2020-11-17 PROCEDURE — 99213 OFFICE O/P EST LOW 20 MIN: CPT | Mod: 25 | Performed by: FAMILY MEDICINE

## 2020-11-17 PROCEDURE — 90662 IIV NO PRSV INCREASED AG IM: CPT | Performed by: FAMILY MEDICINE

## 2020-11-17 ASSESSMENT — PAIN SCALES - GENERAL: PAINLEVEL: EXTREME PAIN (8)

## 2020-11-17 NOTE — PROGRESS NOTES
Subjective     Donn Allen is a 75 year old male who presents to clinic today for the following health issues:    HPI         Patient is here with laceration to his left middle finger. He stated that it happened two days ago while taking down a ladder.  And injured his left middle finger 2 days ago.  He reports been putting triple antibiotic, been washing it.  No more bleeding.  No pain, full range of motion with his fingers.    Patient reports he is allergic to tetanus vaccine.    Review of Systems   Constitutional, HEENT, cardiovascular, pulmonary, gi and gu systems are negative, except as otherwise noted.      Objective    There were no vitals taken for this visit.  There is no height or weight on file to calculate BMI.  Physical Exam   GENERAL: healthy, alert and no distress  SKIN: Left middle finger he does have a curved laceration, 1 to 1-1/2 cm in size.  Skin seems to be intact. No bleeding        Assessment & Plan     Laceration of left middle finger without foreign body without damage to nail, initial encounter  I cleaned the wound with hydrogen peroxide.  I applied  A Steri-Strip.  Triple antibiotic, Band-Aid.  Patient was given instruction to clean it every day with hydrogyen peroxide apply triple antibiotic once or twice daily.    Allergic to tetanus vaccine.  Need for prophylactic vaccination and inoculation against influenza  Given today  - FLUZONE HIGH DOSE 65+  [[id number]]        There are no Patient Instructions on file for this visit.    Return in about 4 weeks (around 12/15/2020) for Routine preventive.    Travis Rose MD  [facility]

## 2021-01-05 DIAGNOSIS — E78.5 HYPERLIPIDEMIA LDL GOAL <70: ICD-10-CM

## 2021-01-05 NOTE — LETTER
January 8, 2021    Donn Allen  [address] [address]        Your provider has sent a  maximilian refill of Atorvastatin. You are due for an appointment for further refills.  Please contact the clinic to schedule an appointment for further refills.   [phone].  Team Bakari Reynaga CMA

## 2021-01-07 RX ORDER — ATORVASTATIN CALCIUM 40 MG/1
40 TABLET, FILM COATED ORAL DAILY
Qty: 90 TABLET | Refills: 0 | Status: SHIPPED | OUTPATIENT
Start: 2021-01-07 | End: 2021-02-10

## 2021-01-08 NOTE — TELEPHONE ENCOUNTER
Routing refill request to provider for review/approval because:  Labs not current:    Lab Results   Component Value Date    CHOL 141 04/15/2019     Lab Results   Component Value Date    HDL 85 04/15/2019     Lab Results   Component Value Date    LDL 46 04/15/2019     Lab Results   Component Value Date    TRIG 51 04/15/2019     Lab Results   Component Value Date    CHOLHDLRATIO 1.5 03/03/2015

## 2021-01-08 NOTE — TELEPHONE ENCOUNTER
Okay refill but patient should see us in clinic at [place] in the next 2-3 months    Please inform patient    Ha Anderson MD

## 2021-01-12 DIAGNOSIS — I10 HYPERTENSION GOAL BP (BLOOD PRESSURE) < 140/90: ICD-10-CM

## 2021-01-12 DIAGNOSIS — K21.9 GASTROESOPHAGEAL REFLUX DISEASE: ICD-10-CM

## 2021-01-14 RX ORDER — LISINOPRIL 5 MG/1
5 TABLET ORAL DAILY
Qty: 90 TABLET | Refills: 0 | Status: SHIPPED | OUTPATIENT
Start: 2021-01-14 | End: 2021-02-10

## 2021-01-15 ENCOUNTER — OFFICE VISIT (OUTPATIENT)
Dept: FAMILY MEDICINE | Facility: CLINIC | Age: 76
End: 2021-01-15
Payer: MEDICARE

## 2021-01-15 ENCOUNTER — NURSE TRIAGE (OUTPATIENT)
Dept: FAMILY MEDICINE | Facility: CLINIC | Age: 76
End: 2021-01-15

## 2021-01-15 VITALS
HEIGHT: 63 IN | HEART RATE: 76 BPM | SYSTOLIC BLOOD PRESSURE: 152 MMHG | WEIGHT: 140 LBS | TEMPERATURE: 97.6 F | DIASTOLIC BLOOD PRESSURE: 68 MMHG | BODY MASS INDEX: 24.8 KG/M2

## 2021-01-15 DIAGNOSIS — I25.10 CAD S/P PERCUTANEOUS CORONARY ANGIOPLASTY: ICD-10-CM

## 2021-01-15 DIAGNOSIS — I10 HYPERTENSION GOAL BP (BLOOD PRESSURE) < 140/90: Primary | ICD-10-CM

## 2021-01-15 DIAGNOSIS — R42 DIZZINESS: ICD-10-CM

## 2021-01-15 DIAGNOSIS — Z98.61 CAD S/P PERCUTANEOUS CORONARY ANGIOPLASTY: ICD-10-CM

## 2021-01-15 PROCEDURE — 99213 OFFICE O/P EST LOW 20 MIN: CPT | Performed by: FAMILY MEDICINE

## 2021-01-15 RX ORDER — AMLODIPINE BESYLATE 5 MG/1
5 TABLET ORAL DAILY
Qty: 30 TABLET | Refills: 1 | Status: SHIPPED | OUTPATIENT
Start: 2021-01-15 | End: 2021-02-10

## 2021-01-15 ASSESSMENT — PAIN SCALES - GENERAL: PAINLEVEL: NO PAIN (0)

## 2021-01-15 ASSESSMENT — MIFFLIN-ST. JEOR: SCORE: 1258.78

## 2021-01-15 NOTE — TELEPHONE ENCOUNTER
Blood pressure currently is 189/61.    Dizzy for 3 days, denies feeling like he is going to pass out, nor it it bad enough he feels like he is going to fall.    No, headaches.      Additional Information    Negative: Shock suspected (e.g., cold/pale/clammy skin, too weak to stand, low BP, rapid pulse)    Negative: Difficult to awaken or acting confused (e.g., disoriented, slurred speech)    Negative: Fainted, and still feels dizzy afterwards    Negative: Severe difficulty breathing (e.g., struggling for each breath, speaks in single words)    Negative: Overdose (accidental or intentional) of medications    Negative: New neurologic deficit that is present now: * Weakness of the face, arm, or leg on one side of the body * Numbness of the face, arm, or leg on one side of the body * Loss of speech or garbled speech    Negative: Heart beating < 50 beats per minute OR > 140 beats per minute    Negative: Sounds like a life-threatening emergency to the triager    Negative: Chest pain    Negative: Rectal bleeding, bloody stool, or tarry-black stool    Negative: Vomiting is the main symptom    Negative: Diarrhea is the main symptom    Negative: Headache is the main symptom    Negative: Heat exhaustion suspected (i.e., dehydration from heat exposure)    Negative: Patient states that he/she is having an anxiety/panic attack    Negative: SEVERE dizziness (e.g., unable to stand, requires support to walk, feels like passing out now)    Negative: SEVERE headache or neck pain    Negative: Spinning or tilting sensation (vertigo) present now and one or more stroke risk factors (i.e., hypertension, diabetes, prior stroke/TIA, heart attack, age over 60) (Exception: prior physician evaluation for this AND no different/worse than usual)    Negative: Loss of vision or double vision    Negative: Extra heart beats OR irregular heart beating (i.e., 'palpitations')    Negative: Difficulty breathing    Negative: Drinking very little and has  "signs of dehydration (e.g., no urine > 12 hours, very dry mouth, very lightheaded)    Negative: Follows bleeding (e.g., stomach, rectum, vagina) (Exception: became dizzy from sight of small amount blood)    Negative: Patient sounds very sick or weak to the triager    Negative: Lightheadedness (dizziness) present now, after 2 hours of rest and fluids    Negative: Spinning or tilting sensation (vertigo) present now    Negative: Fever > 103 F (39.4 C)    Negative: Fever > 100.0 F (37.8 C) and has diabetes mellitus or a weak immune system (e.g., HIV positive, cancer chemotherapy, organ transplant, splenectomy, chronic steroids)    Patient wants to be seen    Negative: Taking a medicine that could cause dizziness (e.g., blood pressure medications, diuretics)    Negative: Diabetes    Negative: Dizziness not present now, but is a chronic symptom (recurrent or ongoing AND lasting > 4 weeks)    Negative: Poor fluid intake probably causing dizziness    Negative: Recent heat exposure probably causing the dizziness    Negative: Sudden or prolonged standing probably causing dizziness    Answer Assessment - Initial Assessment Questions  1. DESCRIPTION: \"Describe your dizziness.\"      Feels dizzy. Denies feeling like he is dizzy. Is still able to shovel snow.  2. LIGHTHEADED: \"Do you feel lightheaded?\" (e.g., somewhat faint, woozy, weak upon standing)      Denies feeling lightheaded or pre-syncopal.  3. VERTIGO: \"Do you feel like either you or the room is spinning or tilting?\" (i.e. vertigo)      Denies feeling like room is spining.  4. SEVERITY: \"How bad is it?\"  \"Do you feel like you are going to faint?\" \"Can you stand and walk?\"    - MILD - walking normally    - MODERATE - interferes with normal activities (e.g., work, school)     - SEVERE - unable to stand, requires support to walk, feels like passing out now.       Mild  5. ONSET:  \"When did the dizziness begin?\"      Three days ago.  6. AGGRAVATING FACTORS: \"Does anything " "make it worse?\" (e.g., standing, change in head position)      Early in AM when he wakes up.  7. HEART RATE: \"Can you tell me your heart rate?\" \"How many beats in 15 seconds?\"  (Note: not all patients can do this)        Unsure.  8. CAUSE: \"What do you think is causing the dizziness?\"      Unsure  9. RECURRENT SYMPTOM: \"Have you had dizziness before?\" If so, ask: \"When was the last time?\" \"What happened that time?\"      Yes.  10. OTHER SYMPTOMS: \"Do you have any other symptoms?\" (e.g., fever, chest pain, vomiting, diarrhea, bleeding)        Denies any pain, SOB, vomiting, chest pain, denies palpitations.  11. PREGNANCY: \"Is there any chance you are pregnant?\" \"When was your last menstrual period?\"        N/A    Protocols used: DIZZINESS-A-OH      Transferred patient to central scheduling to make appointment.    Domenic Pedraza RN, BSN      "

## 2021-01-15 NOTE — PATIENT INSTRUCTIONS
Start amlodipine 5 mg daily     Continue lisinoprol and other meds    Low sodium diet    See us in 1-2 months, sooner if needed

## 2021-01-15 NOTE — PROGRESS NOTES
"       Subjective     Donn is a 75 year old who presents to clinic today for the following health issues     HPI       Hypertension Follow-up      Do you check your blood pressure regularly outside of the clinic? Yes     Are you following a low salt diet? Yes    Are your blood pressures ever more than 140 on the top number (systolic) OR more   than 90 on the bottom number (diastolic), for example 140/90? Yes      How many servings of fruits and vegetables do you eat daily?  2-3    On average, how many sweetened beverages do you drink each day (Examples: soda, juice, sweet tea, etc.  Do NOT count diet or artificially sweetened beverages)?   0    How many days per week do you exercise enough to make your heart beat faster? 7    How many minutes a day do you exercise enough to make your heart beat faster? depends    How many days per week do you miss taking your medication? 0         Review of Systems   Dizzy last night, took blood pressure, 220 systolic    This am 160, later today 190    189/96  Heart rate 61    No pain  At all    Breathing  Okay    Runny nose    No fever/ chills/ cough    No illnesses    Eating and drinking fine    Was getting leg cramps but quit night wine and resolved     Just tore down a garage in [place]      Objective    BP (!) 161/73 (BP Location: Left arm, Patient Position: Chair, Cuff Size: Adult Regular)   Pulse 76   Temp 97.6  F (36.4  C) (Oral)   Ht 1.59 m (5' 2.6\")   Wt 63.5 kg (140 lb)   BMI 25.12 kg/m    Body mass index is 25.12 kg/m .  Physical Exam  Constitutional:       Appearance: He is well-developed.   HENT:      Head: Normocephalic and atraumatic.   Eyes:      Conjunctiva/sclera: Conjunctivae normal.   Neck:      Vascular: No carotid bruit.   Cardiovascular:      Rate and Rhythm: Normal rate and regular rhythm.      Heart sounds: Normal heart sounds.   Pulmonary:      Effort: Pulmonary effort is normal. No respiratory distress.      Breath sounds: Normal breath sounds. "   Abdominal:      Palpations: Abdomen is soft.      Tenderness: There is no abdominal tenderness.   Neurological:      Mental Status: He is alert and oriented to person, place, and time.      Cranial Nerves: No cranial nerve deficit.   Psychiatric:         Speech: Speech normal.         Behavior: Behavior normal.           ASSESSMENT / PLAN:  (I10) Hypertension goal BP (blood pressure) < 140/90  (primary encounter diagnosis)  Comment: needs additional blood pressure med. Keep taking lisinopril, add amlodipine.    Plan: amLODIPine (NORVASC) 5 MG tablet        See us in 1-2 months    (I25.10,  Z98.61) CAD S/P percutaneous coronary angioplasty  Comment: no anginal symptoms   Plan: monitor     (R42) Dizziness  Comment: this comes with the high blood pressure readings, so should resolve with better control   Plan: as above.  If not resolving let us know    Be seen promptly if symptoms acutely worsen       I reviewed the patient's medications, allergies, medical history, family history, and social history.    Ha Anderson MD

## 2021-02-05 ENCOUNTER — AMBULATORY - HEALTHEAST (OUTPATIENT)
Dept: NURSING | Facility: CLINIC | Age: 76
End: 2021-02-05

## 2021-02-10 ENCOUNTER — OFFICE VISIT (OUTPATIENT)
Dept: FAMILY MEDICINE | Facility: CLINIC | Age: 76
End: 2021-02-10
Payer: MEDICARE

## 2021-02-10 VITALS
WEIGHT: 139.5 LBS | SYSTOLIC BLOOD PRESSURE: 131 MMHG | DIASTOLIC BLOOD PRESSURE: 65 MMHG | BODY MASS INDEX: 24.72 KG/M2 | HEART RATE: 57 BPM | TEMPERATURE: 97.6 F | HEIGHT: 63 IN

## 2021-02-10 DIAGNOSIS — K21.9 GASTROESOPHAGEAL REFLUX DISEASE, UNSPECIFIED WHETHER ESOPHAGITIS PRESENT: ICD-10-CM

## 2021-02-10 DIAGNOSIS — E78.5 HYPERLIPIDEMIA LDL GOAL <70: ICD-10-CM

## 2021-02-10 DIAGNOSIS — I25.10 CAD S/P PERCUTANEOUS CORONARY ANGIOPLASTY: ICD-10-CM

## 2021-02-10 DIAGNOSIS — Z98.61 CAD S/P PERCUTANEOUS CORONARY ANGIOPLASTY: ICD-10-CM

## 2021-02-10 DIAGNOSIS — R73.01 IMPAIRED FASTING GLUCOSE: ICD-10-CM

## 2021-02-10 DIAGNOSIS — H54.7 VISION PROBLEM: ICD-10-CM

## 2021-02-10 DIAGNOSIS — Z00.00 ROUTINE GENERAL MEDICAL EXAMINATION AT A HEALTH CARE FACILITY: Primary | ICD-10-CM

## 2021-02-10 DIAGNOSIS — I10 HYPERTENSION GOAL BP (BLOOD PRESSURE) < 140/90: ICD-10-CM

## 2021-02-10 DIAGNOSIS — R53.83 FATIGUE, UNSPECIFIED TYPE: ICD-10-CM

## 2021-02-10 LAB
ALBUMIN SERPL-MCNC: 4 G/DL (ref 3.4–5)
ALP SERPL-CCNC: 92 U/L (ref 40–150)
ALT SERPL W P-5'-P-CCNC: 26 U/L (ref 0–70)
ANION GAP SERPL CALCULATED.3IONS-SCNC: 5 MMOL/L (ref 3–14)
AST SERPL W P-5'-P-CCNC: 23 U/L (ref 0–45)
BASOPHILS # BLD AUTO: 0 10E9/L (ref 0–0.2)
BASOPHILS NFR BLD AUTO: 0.3 %
BILIRUB SERPL-MCNC: 0.4 MG/DL (ref 0.2–1.3)
BUN SERPL-MCNC: 19 MG/DL (ref 7–30)
CALCIUM SERPL-MCNC: 9.1 MG/DL (ref 8.5–10.1)
CHLORIDE SERPL-SCNC: 105 MMOL/L (ref 94–109)
CHOLEST SERPL-MCNC: 145 MG/DL
CO2 SERPL-SCNC: 29 MMOL/L (ref 20–32)
CREAT SERPL-MCNC: 0.98 MG/DL (ref 0.66–1.25)
DIFFERENTIAL METHOD BLD: ABNORMAL
EOSINOPHIL # BLD AUTO: 0.1 10E9/L (ref 0–0.7)
EOSINOPHIL NFR BLD AUTO: 2.2 %
ERYTHROCYTE [DISTWIDTH] IN BLOOD BY AUTOMATED COUNT: 13.6 % (ref 10–15)
GFR SERPL CREATININE-BSD FRML MDRD: 75 ML/MIN/{1.73_M2}
GLUCOSE SERPL-MCNC: 90 MG/DL (ref 70–99)
HBA1C MFR BLD: 5.8 % (ref 0–5.6)
HCT VFR BLD AUTO: 40.4 % (ref 40–53)
HDLC SERPL-MCNC: 84 MG/DL
HGB BLD-MCNC: 13.2 G/DL (ref 13.3–17.7)
LDLC SERPL CALC-MCNC: 51 MG/DL
LYMPHOCYTES # BLD AUTO: 1.8 10E9/L (ref 0.8–5.3)
LYMPHOCYTES NFR BLD AUTO: 30.6 %
MCH RBC QN AUTO: 30.1 PG (ref 26.5–33)
MCHC RBC AUTO-ENTMCNC: 32.7 G/DL (ref 31.5–36.5)
MCV RBC AUTO: 92 FL (ref 78–100)
MONOCYTES # BLD AUTO: 0.4 10E9/L (ref 0–1.3)
MONOCYTES NFR BLD AUTO: 6.6 %
NEUTROPHILS # BLD AUTO: 3.6 10E9/L (ref 1.6–8.3)
NEUTROPHILS NFR BLD AUTO: 60.3 %
NONHDLC SERPL-MCNC: 61 MG/DL
PLATELET # BLD AUTO: 226 10E9/L (ref 150–450)
POTASSIUM SERPL-SCNC: 4.6 MMOL/L (ref 3.4–5.3)
PROT SERPL-MCNC: 7.6 G/DL (ref 6.8–8.8)
RBC # BLD AUTO: 4.39 10E12/L (ref 4.4–5.9)
SODIUM SERPL-SCNC: 139 MMOL/L (ref 133–144)
TRIGL SERPL-MCNC: 52 MG/DL
TSH SERPL DL<=0.005 MIU/L-ACNC: 3.14 MU/L (ref 0.4–4)
WBC # BLD AUTO: 5.9 10E9/L (ref 4–11)

## 2021-02-10 PROCEDURE — 80061 LIPID PANEL: CPT | Performed by: FAMILY MEDICINE

## 2021-02-10 PROCEDURE — G0439 PPPS, SUBSEQ VISIT: HCPCS | Performed by: FAMILY MEDICINE

## 2021-02-10 PROCEDURE — 85027 COMPLETE CBC AUTOMATED: CPT | Performed by: FAMILY MEDICINE

## 2021-02-10 PROCEDURE — 84443 ASSAY THYROID STIM HORMONE: CPT | Performed by: FAMILY MEDICINE

## 2021-02-10 PROCEDURE — 99213 OFFICE O/P EST LOW 20 MIN: CPT | Mod: 25 | Performed by: FAMILY MEDICINE

## 2021-02-10 PROCEDURE — 80053 COMPREHEN METABOLIC PANEL: CPT | Performed by: FAMILY MEDICINE

## 2021-02-10 PROCEDURE — 83036 HEMOGLOBIN GLYCOSYLATED A1C: CPT | Performed by: FAMILY MEDICINE

## 2021-02-10 PROCEDURE — 36415 COLL VENOUS BLD VENIPUNCTURE: CPT | Performed by: FAMILY MEDICINE

## 2021-02-10 RX ORDER — LISINOPRIL 5 MG/1
5 TABLET ORAL DAILY
Qty: 90 TABLET | Refills: 3 | Status: SHIPPED | OUTPATIENT
Start: 2021-02-10 | End: 2022-03-07

## 2021-02-10 RX ORDER — ATORVASTATIN CALCIUM 40 MG/1
40 TABLET, FILM COATED ORAL DAILY
Qty: 90 TABLET | Refills: 3 | Status: SHIPPED | OUTPATIENT
Start: 2021-02-10 | End: 2022-03-07

## 2021-02-10 RX ORDER — AMLODIPINE BESYLATE 5 MG/1
5 TABLET ORAL DAILY
Qty: 90 TABLET | Refills: 3 | Status: SHIPPED | OUTPATIENT
Start: 2021-02-10 | End: 2022-01-20

## 2021-02-10 ASSESSMENT — ENCOUNTER SYMPTOMS
SORE THROAT: 0
JOINT SWELLING: 0
CHILLS: 1
COUGH: 0
HEADACHES: 0
MYALGIAS: 1
ARTHRALGIAS: 0
DIZZINESS: 1
CONSTIPATION: 0
HEMATURIA: 0
FREQUENCY: 1
DYSURIA: 0
ABDOMINAL PAIN: 0
NAUSEA: 0
NERVOUS/ANXIOUS: 0
PARESTHESIAS: 0
WEAKNESS: 0
SHORTNESS OF BREATH: 0
PALPITATIONS: 0
EYE PAIN: 0
HEMATOCHEZIA: 0
FEVER: 0
DIARRHEA: 0
HEARTBURN: 0

## 2021-02-10 ASSESSMENT — MIFFLIN-ST. JEOR: SCORE: 1256.51

## 2021-02-10 ASSESSMENT — ACTIVITIES OF DAILY LIVING (ADL): CURRENT_FUNCTION: NO ASSISTANCE NEEDED

## 2021-02-10 ASSESSMENT — PAIN SCALES - GENERAL: PAINLEVEL: NO PAIN (0)

## 2021-02-10 NOTE — PATIENT INSTRUCTIONS
We will send you lab results    Keep working on healthy diet/exercise     Schedule eye exam     Call / return to clinic with problems/ questions

## 2021-02-10 NOTE — LETTER
"February 11, 2021      Donn Allen  [address] [address]        Dear ,    We are writing to inform you of your test results.    The hemoglobin a1c is barely into the \"prediabetes\" range.  No medications needed for this.  Just keep working on healthy diet/exercise.     Red blood count ( hemoglobin ) is back to almost normal.     Other labs are fine.       Resulted Orders   Lipid panel reflex to direct LDL Fasting   Result Value Ref Range    Cholesterol 145 <200 mg/dL    Triglycerides 52 <150 mg/dL      Comment:      Fasting specimen    HDL Cholesterol 84 >39 mg/dL    LDL Cholesterol Calculated 51 <100 mg/dL      Comment:      Desirable:       <100 mg/dl    Non HDL Cholesterol 61 <130 mg/dL   Comprehensive metabolic panel   Result Value Ref Range    Sodium 139 133 - 144 mmol/L    Potassium 4.6 3.4 - 5.3 mmol/L    Chloride 105 94 - 109 mmol/L    Carbon Dioxide 29 20 - 32 mmol/L    Anion Gap 5 3 - 14 mmol/L    Glucose 90 70 - 99 mg/dL      Comment:      Fasting specimen    Urea Nitrogen 19 7 - 30 mg/dL    Creatinine 0.98 0.66 - 1.25 mg/dL    GFR Estimate 75 >60 mL/min/[1.73_m2]      Comment:      Non  GFR Calc  Starting 12/18/2018, serum creatinine based estimated GFR (eGFR) will be   calculated using the Chronic Kidney Disease Epidemiology Collaboration   (CKD-EPI) equation.      GFR Estimate If Black 87 >60 mL/min/[1.73_m2]      Comment:       GFR Calc  Starting 12/18/2018, serum creatinine based estimated GFR (eGFR) will be   calculated using the Chronic Kidney Disease Epidemiology Collaboration   (CKD-EPI) equation.      Calcium 9.1 8.5 - 10.1 mg/dL    Bilirubin Total 0.4 0.2 - 1.3 mg/dL    Albumin 4.0 3.4 - 5.0 g/dL    Protein Total 7.6 6.8 - 8.8 g/dL    Alkaline Phosphatase 92 40 - 150 U/L    ALT 26 0 - 70 U/L    AST 23 0 - 45 U/L   TSH with free T4 reflex   Result Value Ref Range    TSH 3.14 0.40 - 4.00 mU/L   CBC with platelets differential "   Result Value Ref Range    WBC 5.9 4.0 - 11.0 10e9/L    RBC Count 4.39 (L) 4.4 - 5.9 10e12/L    Hemoglobin 13.2 (L) 13.3 - 17.7 g/dL    Hematocrit 40.4 40.0 - 53.0 %    MCV 92 78 - 100 fl    MCH 30.1 26.5 - 33.0 pg    MCHC 32.7 31.5 - 36.5 g/dL    RDW 13.6 10.0 - 15.0 %    Platelet Count 226 150 - 450 10e9/L    % Neutrophils 60.3 %    % Lymphocytes 30.6 %    % Monocytes 6.6 %    % Eosinophils 2.2 %    % Basophils 0.3 %    Absolute Neutrophil 3.6 1.6 - 8.3 10e9/L    Absolute Lymphocytes 1.8 0.8 - 5.3 10e9/L    Absolute Monocytes 0.4 0.0 - 1.3 10e9/L    Absolute Eosinophils 0.1 0.0 - 0.7 10e9/L    Absolute Basophils 0.0 0.0 - 0.2 10e9/L    Diff Method Automated Method    Hemoglobin A1c   Result Value Ref Range    Hemoglobin A1C 5.8 (H) 0 - 5.6 %      Comment:      Normal <5.7% Prediabetes 5.7-6.4%  Diabetes 6.5% or higher - adopted from ADA   consensus guidelines.         If you have any questions or concerns, please call the clinic at the number listed above.       Sincerely,      Ha Anderson MD/[place]

## 2021-02-10 NOTE — PROGRESS NOTES
"SUBJECTIVE:   Donn Allen is a 75 year old male who presents for Preventive Visit.       Patient has been advised of split billing requirements and indicates understanding: Yes   Are you in the first 12 months of your Medicare coverage?  No    Healthy Habits:     In general, how would you rate your overall health?  Good    Frequency of exercise:  4-5 days/week    Duration of exercise:  15-30 minutes    Do you usually eat at least 4 servings of fruit and vegetables a day, include whole grains    & fiber and avoid regularly eating high fat or \"junk\" foods?  No    Taking medications regularly:  Yes    Medication side effects:  None    Ability to successfully perform activities of daily living:  No assistance needed    Home Safety:  No safety concerns identified    Hearing Impairment:  Difficulty following a conversation in a noisy restaurant or crowded room, feel that people are mumbling or not speaking clearly, need to ask people to speak up or repeat themselves and difficulty understanding soft or whispered speech    In the past 6 months, have you been bothered by leaking of urine? Yes    In general, how would you rate your overall mental or emotional health?  Fair      PHQ-2 Total Score: 0    Additional concerns today:  No    Do you feel safe in your environment? Yes    Have you ever done Advance Care Planning? (For example, a Health Directive, POLST, or a discussion with a medical provider or your loved ones about your wishes): Yes, advance care planning is on file.       Fall risk       Cognitive Screening   1) Repeat 3 items (Leader, Season, Table)     2) Clock draw:  ABNORMAL   3) 3 item recall:  Recalls 3 objects  Results: ABNORMAL clock, 1-2 items recalled: PROBABLE COGNITIVE IMPAIRMENT, **INFORM PROVIDER**    Mini-CogTM Copyright LINDA Leo. Licensed by the author for use in [facility]; reprinted with permission (hardeep@.[facility]). All rights reserved.      Do you have sleep apnea, excessive snoring " or daytime drowsiness?: no    Reviewed and updated as needed this visit by clinical staff  Tobacco  Allergies  Meds   Med Hx  Surg Hx  Fam Hx  Soc Hx        Reviewed and updated as needed this visit by Provider                Social History     Tobacco Use     Smoking status: Former Smoker     Types: Cigarettes     Quit date: 1972     Years since quittin.1     Smokeless tobacco: Never Used     Tobacco comment: non-smoking household   Substance Use Topics     Alcohol use: Yes     Comment: occ beer / wine     If you drink alcohol do you typically have >3 drinks per day or >7 drinks per week? Yes      Alcohol Use 2/10/2021   Prescreen: >3 drinks/day or >7 drinks/week? No   Prescreen: >3 drinks/day or >7 drinks/week? -   No flowsheet data found.             Current providers sharing in care for this patient include:     Patient Care Team:  Ha Anderson MD as PCP - Iesha Ugalde MD as Referring Physician (Emergency Medicine)  Ha Anderson MD as Assigned PCP    The following health maintenance items are reviewed in Epic and correct as of today:  Health Maintenance   Topic Date Due     DTAP/TDAP/TD IMMUNIZATION (1 - Tdap) 1970     ZOSTER IMMUNIZATION (1 of 2) 1995     EYE EXAM  2018     COVID-19 Vaccine (2 of 2 - Pfizer series) 2021     FALL RISK ASSESSMENT  01/15/2022     MEDICARE ANNUAL WELLNESS VISIT  02/10/2022     COLORECTAL CANCER SCREENING  2024     LIPID  04/15/2024     ADVANCE CARE PLANNING  02/10/2026     HEPATITIS C SCREENING  Completed     PHQ-2  Completed     INFLUENZA VACCINE  Completed     Pneumococcal Vaccine: 65+ Years  Completed     AORTIC ANEURYSM SCREENING (SYSTEM ASSIGNED)  Completed     Pneumococcal Vaccine: Pediatrics (0 to 5 Years) and At-Risk Patients (6 to 64 Years)  Aged Out     IPV IMMUNIZATION  Aged Out     MENINGITIS IMMUNIZATION  Aged Out     HEPATITIS B IMMUNIZATION  Aged Out            Review of Systems  "  Constitutional: Positive for chills. Negative for fever.   HENT: Positive for hearing loss. Negative for ear pain and sore throat.    Eyes: Negative for pain and visual disturbance.   Respiratory: Negative for cough and shortness of breath.    Cardiovascular: Positive for chest pain. Negative for palpitations and peripheral edema.   Gastrointestinal: Negative for abdominal pain, constipation, diarrhea, heartburn, hematochezia and nausea.   Genitourinary: Positive for frequency and urgency. Negative for discharge, dysuria, genital sores, hematuria and impotence.   Musculoskeletal: Positive for myalgias. Negative for arthralgias and joint swelling.   Skin: Negative for rash.   Neurological: Positive for dizziness. Negative for weakness, headaches and paresthesias.   Psychiatric/Behavioral: Negative for mood changes. The patient is not nervous/anxious.      We added the amlodipine last time in jan  No  Side effects  Not dizzy or lightheaded  Currently     Staying hydrated    Some  Leg aches, worse if drinks wine    Very little  etoh now    Drinks  A lot of 1/2  caf coffee  Urinates a lot     Stream okay    Dribbles some      Wears pad , changes once daily    No chest pain  Recently    None with exertion    Hearing the same  No hearing aids  Not interested      [place]    Got 1st shot last week    Kids  In good health    6 or 7 grandkids    Stomach good as long as takes the omeprazole    Drinks tea    Uses stool softener as needed     Wt higher in winter, lower in summer    Gets lots of sleep    No skin problems per patient       OBJECTIVE:   /65 (BP Location: Right arm, Patient Position: Chair, Cuff Size: Adult Regular)   Pulse 57   Temp 97.6  F (36.4  C) (Oral)   Ht 1.59 m (5' 2.6\")   Wt 63.3 kg (139 lb 8 oz)   BMI 25.03 kg/m   Estimated body mass index is 25.03 kg/m  as calculated from the following:    Height as of this encounter: 1.59 m (5' 2.6\").    Weight as of this encounter: " 63.3 kg (139 lb 8 oz).  Physical Exam  GENERAL: healthy, alert and no distress  EYES: Eyes grossly normal to inspection, PERRL and conjunctivae and sclerae normal  HENT: ear canals and TM's normal, nose and mouth without ulcers or lesions  NECK: no adenopathy, no asymmetry, masses, or scars and thyroid normal to palpation  RESP: lungs clear to auscultation - no rales, rhonchi or wheezes  CV: regular rate and rhythm, normal S1 S2, no S3 or S4, no murmur, click or rub, no peripheral edema and peripheral pulses strong  ABDOMEN: soft, nontender, no hepatosplenomegaly, no masses and bowel sounds normal  MS: no gross musculoskeletal defects noted, no edema  SKIN: no suspicious lesions or rashes  NEURO: Normal strength and tone, mentation intact and speech normal  PSYCH: mentation appears normal, affect normal/bright    Diagnostic Test Results:  Labs reviewed in Epic    ASSESSMENT / PLAN:   Donn was seen today for wellness visit and health maintenance.    Diagnoses and all orders for this visit:    Routine general medical examination at a health care facility    Hypertension goal BP (blood pressure) < 140/90  -     amLODIPine (NORVASC) 5 MG tablet; Take 1 tablet (5 mg) by mouth daily  -     lisinopril (ZESTRIL) 5 MG tablet; Take 1 tablet (5 mg) by mouth daily    Gastroesophageal reflux disease, unspecified whether esophagitis present  -     omeprazole (PRILOSEC) 20 MG DR capsule; 1 po daily    Hyperlipidemia LDL goal <70  -     atorvastatin (LIPITOR) 40 MG tablet; Take 1 tablet (40 mg) by mouth daily  -     Lipid panel reflex to direct LDL Fasting  -     Comprehensive metabolic panel    CAD S/P percutaneous coronary angioplasty    Vision problem  -     EYE ADULT REFERRAL; Future    Fatigue, unspecified type  -     TSH with free T4 reflex  -     CBC with platelets differential    Impaired fasting glucose  -     Hemoglobin A1c    discussed multiple issues with patient  Blood pressure better after addition of  "amlodipine  Refill this and the lisinopril  gerd very well controlled on ppi; refill this  Reviewed past scope exam results  Refill statin  No anginal symptoms ( no hear problems since he got the stents a few years ago )  Encouraged patient to schedule eye exam    At one point in past hemoglobin a1c up to 6.0  Recheck that today    Patient has been advised of split billing requirements and indicates understanding: Yes  COUNSELING:  Reviewed preventive health counseling, as reflected in patient instructions       Regular exercise       Healthy diet/nutrition       Vision screening       Hearing screening       Dental care    Estimated body mass index is 25.03 kg/m  as calculated from the following:    Height as of this encounter: 1.59 m (5' 2.6\").    Weight as of this encounter: 63.3 kg (139 lb 8 oz).        He reports that he quit smoking about 49 years ago. His smoking use included cigarettes. He has never used smokeless tobacco.      Appropriate preventive services were discussed with this patient, including applicable screening as appropriate for cardiovascular disease, diabetes, osteopenia/osteoporosis, and glaucoma.  As appropriate for age/gender, discussed screening for colorectal cancer, prostate cancer, breast cancer, and cervical cancer. Checklist reviewing preventive services available has been given to the patient.    Reviewed patients plan of care and provided an AVS. The Basic Care Plan (routine screening as documented in Health Maintenance) for Donn meets the Care Plan requirement. This Care Plan has been established and reviewed with the Patient.    Counseling Resources:  ATP IV Guidelines  Pooled Cohorts Equation Calculator  Breast Cancer Risk Calculator  Breast Cancer: Medication to Reduce Risk  FRAX Risk Assessment  ICSI Preventive Guidelines  Dietary Guidelines for Americans, 2010  [employer]'s MyPlate  ASA Prophylaxis  Lung CA Screening    Ha Anderson MD  [facility] " [facility]    Identified Health Risks:

## 2021-02-11 NOTE — RESULT ENCOUNTER NOTE
"The hemoglobin a1c is barely into the \"prediabetes\" range.  No medications needed for this.  Just keep working on healthy diet/exercise.     Red blood count ( hemoglobin ) is back to almost normal.     Other labs are fine.    Ha Anderson MD  "

## 2021-02-26 ENCOUNTER — AMBULATORY - HEALTHEAST (OUTPATIENT)
Dept: NURSING | Facility: CLINIC | Age: 76
End: 2021-02-26
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
## 2021-04-09 ENCOUNTER — OFFICE VISIT (OUTPATIENT)
Dept: OPHTHALMOLOGY | Facility: CLINIC | Age: 76
End: 2021-04-09
Payer: MEDICARE

## 2021-04-09 DIAGNOSIS — Z98.890 HISTORY OF VITRECTOMY: ICD-10-CM

## 2021-04-09 DIAGNOSIS — H52.4 PRESBYOPIA: ICD-10-CM

## 2021-04-09 DIAGNOSIS — Z96.1 PSEUDOPHAKIA: Primary | ICD-10-CM

## 2021-04-09 DIAGNOSIS — H27.01 APHAKIA OF RIGHT EYE: ICD-10-CM

## 2021-04-09 DIAGNOSIS — H35.371 EPIRETINAL MEMBRANE, RIGHT EYE: ICD-10-CM

## 2021-04-09 DIAGNOSIS — Z01.01 ENCOUNTER FOR EXAMINATION OF EYES AND VISION WITH ABNORMAL FINDINGS: ICD-10-CM

## 2021-04-09 DIAGNOSIS — H35.412 LATTICE DEGENERATION OF LEFT RETINA: ICD-10-CM

## 2021-04-09 DIAGNOSIS — H40.001 GLAUCOMA SUSPECT OF RIGHT EYE: ICD-10-CM

## 2021-04-09 DIAGNOSIS — H43.812 POSTERIOR VITREOUS DETACHMENT OF LEFT EYE: ICD-10-CM

## 2021-04-09 PROCEDURE — 92004 COMPRE OPH EXAM NEW PT 1/>: CPT | Performed by: OPHTHALMOLOGY

## 2021-04-09 PROCEDURE — 92015 DETERMINE REFRACTIVE STATE: CPT | Mod: GY | Performed by: OPHTHALMOLOGY

## 2021-04-09 ASSESSMENT — REFRACTION_MANIFEST
OS_ADD: +3.00
OS_SPHERE: -2.50
OD_CYLINDER: +1.00
OD_SPHERE: +8.00
OD_AXIS: 080
OS_CYLINDER: +0.50
OS_AXIS: 180
OD_ADD: +3.00

## 2021-04-09 ASSESSMENT — REFRACTION_WEARINGRX
OD_SPHERE: PLANO
OS_ADD: +3.00
SPECS_TYPE: BIFOCAL
OD_CYLINDER: SPHERE
OS_CYLINDER: +0.75
OS_SPHERE: -2.25
OD_ADD: +3.00
OS_AXIS: 047

## 2021-04-09 ASSESSMENT — VISUAL ACUITY
OS_CC: 20/30+1
OS_CC: J1
CORRECTION_TYPE: GLASSES
METHOD: SNELLEN - LINEAR
OD_SC: CF

## 2021-04-09 ASSESSMENT — TONOMETRY
IOP_METHOD: APPLANATION
OD_IOP_MMHG: 14
OS_IOP_MMHG: 12

## 2021-04-09 ASSESSMENT — CONF VISUAL FIELD
OS_NORMAL: 1
OD_SUPERIOR_NASAL_RESTRICTION: 1

## 2021-04-09 NOTE — LETTER
4/9/2021         RE: Donn Allen  [address] [address]        Dear Colleague,    Thank you for referring your patient, Donn Allen, to the [facility]. Please see a copy of my visit note below.     Current Eye Medications:  no     Subjective:  Comprehensive eye exam.   Pt reports that his vision left eye is more blurred, vision right eye just a blurry.    Never got CL right eye.     Objective:  See Ophthalmology Exam.       Assessment:  New posterior vitreous detachment left eye and mild posterior capsule opacity left eye; otherwise stable eye exam.      ICD-10-CM    1. Pseudophakia, os  Z96.1    2. Aphakia of right eye  H27.01    3. 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 Epiretinal membrane, right eye  H35.371    6. History of vitrectomy, od   Z98.890    7. Posterior vitreous detachment of left eye  H43.812    8. Encounter for examination of eyes and vision with abnormal findings  Z01.01    9. Presbyopia  H52.4 REFRACTIVE STATUS     EYE EXAM (SIMPLE-NONBILLABLE)        Plan:  Glasses Rx given - optional  Use artificial tears up to 4 times daily both eyes as needed (Refresh Tears, Systane Ultra/Balance, or Theratears)   Possible clouding of posterior capsule left eye discussed.   Return visit 6 months for an intraocular pressure check, glaucoma OCT, retinal OCT, and Hurt Visual Field.  Donn Price M.D.  [phone]           Again, thank you for allowing me to participate in the care of your patient.        Sincerely,        Donn Price MD

## 2021-04-09 NOTE — PATIENT INSTRUCTIONS
Glasses Rx given - optional  Use artificial tears up to 4 times daily both eyes as needed (Refresh Tears, Systane Ultra/Balance, or Theratears)   Possible clouding of posterior capsule left eye discussed.   Return visit 6 months for an intraocular pressure check, glaucoma OCT, retinal OCT, and Hurt Visual Field.  Donn Price M.D.  [phone]

## 2021-04-09 NOTE — PROGRESS NOTES
Current Eye Medications:  no     Subjective:  Comprehensive eye exam.   Pt reports that his vision left eye is more blurred, vision right eye just a blurry.    Never got CL right eye.     Objective:  See Ophthalmology Exam.       Assessment:  New posterior vitreous detachment left eye and mild posterior capsule opacity left eye; otherwise stable eye exam.      ICD-10-CM    1. Pseudophakia, os  Z96.1    2. Aphakia of right eye  H27.01    3. 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 Epiretinal membrane, right eye  H35.371    6. History of vitrectomy, od   Z98.890    7. Posterior vitreous detachment of left eye  H43.812    8. Encounter for examination of eyes and vision with abnormal findings  Z01.01    9. Presbyopia  H52.4 REFRACTIVE STATUS     EYE EXAM (SIMPLE-NONBILLABLE)        Plan:  Glasses Rx given - optional  Use artificial tears up to 4 times daily both eyes as needed (Refresh Tears, Systane Ultra/Balance, or Theratears)   Possible clouding of posterior capsule left eye discussed.   Return visit 6 months for an intraocular pressure check, glaucoma OCT, retinal OCT, and Hurt Visual Field.  Donn Price M.D.  [phone]

## 2021-04-17 PROBLEM — H43.812 POSTERIOR VITREOUS DETACHMENT OF LEFT EYE: Status: ACTIVE | Noted: 2021-04-17

## 2021-04-17 ASSESSMENT — PACHYMETRY
OD_CT(UM): .558
OS_CT(UM): .559
EXAM_DATE: 12/5/2016

## 2021-04-17 ASSESSMENT — CUP TO DISC RATIO
OD_RATIO: 0.9
OS_RATIO: 0.4

## 2021-04-17 ASSESSMENT — EXTERNAL EXAM - LEFT EYE: OS_EXAM: 3+ BROW PTOSIS

## 2021-04-17 ASSESSMENT — SLIT LAMP EXAM - LIDS
COMMENTS: 1+ DERMATOCHALASIS - UPPER LID
COMMENTS: 1+ DERMATOCHALASIS - UPPER LID

## 2021-04-17 ASSESSMENT — EXTERNAL EXAM - RIGHT EYE: OD_EXAM: 3+ BROW PTOSIS

## 2021-09-05 ENCOUNTER — HEALTH MAINTENANCE LETTER (OUTPATIENT)
Age: 76
End: 2021-09-05

## 2021-11-19 ENCOUNTER — OFFICE VISIT (OUTPATIENT)
Dept: OPHTHALMOLOGY | Facility: CLINIC | Age: 76
End: 2021-11-19
Payer: MEDICARE

## 2021-11-19 DIAGNOSIS — R68.89 SUSPECTED GLAUCOMA OF RIGHT EYE: Primary | ICD-10-CM

## 2021-11-19 DIAGNOSIS — H35.371 EPIRETINAL MEMBRANE, RIGHT EYE: ICD-10-CM

## 2021-11-19 PROCEDURE — 92083 EXTENDED VISUAL FIELD XM: CPT | Performed by: OPHTHALMOLOGY

## 2021-11-19 PROCEDURE — 92012 INTRM OPH EXAM EST PATIENT: CPT | Performed by: OPHTHALMOLOGY

## 2021-11-19 ASSESSMENT — VISUAL ACUITY
METHOD: SNELLEN - LINEAR
CORRECTION_TYPE: GLASSES
OS_CC: 20/20

## 2021-11-19 ASSESSMENT — SLIT LAMP EXAM - LIDS
COMMENTS: 1+ DERMATOCHALASIS - UPPER LID
COMMENTS: 1+ DERMATOCHALASIS - UPPER LID

## 2021-11-19 ASSESSMENT — TONOMETRY
OD_IOP_MMHG: 15
IOP_METHOD: APPLANATION
OS_IOP_MMHG: 12

## 2021-11-19 ASSESSMENT — EXTERNAL EXAM - RIGHT EYE: OD_EXAM: 3+ BROW PTOSIS

## 2021-11-19 ASSESSMENT — EXTERNAL EXAM - LEFT EYE: OS_EXAM: 3+ BROW PTOSIS

## 2021-11-19 NOTE — PROGRESS NOTES
Current Eye Medications:  none     Subjective: here for an intraocular pressure check, glaucoma OCT, retinal OCT, and Hurt Visual Field for glaucoma suspect. Restarted VF left eye, wasn't picking up a blind spot at first.     Objective:  See Ophthalmology Exam.       Assessment:  Mild worsening of NFL right eye on glaucoma OCT; would like intraocular pressure lower.  Left eye all within normal limits.      Plan:  Start Timolol: One drop right eye every morning.  Return visit 5 months for a complete exam.    Donn Price M.D.  [phone]

## 2021-11-19 NOTE — LETTER
11/19/2021         RE: Donn Allen  [address] [address]        Dear Colleague,    Thank you for referring your patient, Donn Allen, to the [facility]. Please see a copy of my visit note below.     Current Eye Medications:  none     Subjective: here for an intraocular pressure check, glaucoma OCT, retinal OCT, and Hurt Visual Field for glaucoma suspect. Restarted VF left eye, wasn't picking up a blind spot at first.     Objective:  See Ophthalmology Exam.       Assessment:  Mild worsening of NFL right eye on glaucoma OCT; would like intraocular pressure lower.  Left eye all within normal limits.      Plan:  Start Timolol: One drop right eye every morning.  Return visit 5 months for a complete exam.    Donn Price M.D.  [phone]             Again, thank you for allowing me to participate in the care of your patient.        Sincerely,        Donn Price MD

## 2021-11-19 NOTE — PATIENT INSTRUCTIONS
Start Timolol: One drop right eye every morning.  Return visit 5 months for a complete exam.    Donn Price M.D.  [phone]

## 2021-11-21 RX ORDER — TIMOLOL MALEATE 5 MG/ML
1 SOLUTION/ DROPS OPHTHALMIC DAILY
Qty: 5 ML | Refills: 11 | Status: SHIPPED | OUTPATIENT
Start: 2021-11-21 | End: 2022-11-02

## 2021-11-21 ASSESSMENT — PACHYMETRY
OD_CT(UM): .558
OS_CT(UM): .559
EXAM_DATE: 12/5/2016

## 2022-01-19 DIAGNOSIS — I10 HYPERTENSION GOAL BP (BLOOD PRESSURE) < 140/90: ICD-10-CM

## 2022-01-20 RX ORDER — AMLODIPINE BESYLATE 5 MG/1
TABLET ORAL
Qty: 90 TABLET | Refills: 0 | Status: SHIPPED | OUTPATIENT
Start: 2022-01-20 | End: 2022-03-07

## 2022-01-20 NOTE — TELEPHONE ENCOUNTER
Prescription approved per [facility] Refill Protocol.      Savita MEDEIROS RN  [facility]

## 2022-02-28 ASSESSMENT — ENCOUNTER SYMPTOMS
JOINT SWELLING: 0
SHORTNESS OF BREATH: 0
EYE PAIN: 0
DIZZINESS: 0
FEVER: 0
MYALGIAS: 1
PALPITATIONS: 0
NERVOUS/ANXIOUS: 0
PARESTHESIAS: 0
COUGH: 0
WEAKNESS: 0
NAUSEA: 0
CONSTIPATION: 0
CHILLS: 0
HEMATURIA: 0
HEARTBURN: 0
HEMATOCHEZIA: 0
DIARRHEA: 0
ARTHRALGIAS: 0
HEADACHES: 0
ABDOMINAL PAIN: 0
SORE THROAT: 0
FREQUENCY: 1

## 2022-02-28 ASSESSMENT — ACTIVITIES OF DAILY LIVING (ADL): CURRENT_FUNCTION: NO ASSISTANCE NEEDED

## 2022-03-07 ENCOUNTER — OFFICE VISIT (OUTPATIENT)
Dept: FAMILY MEDICINE | Facility: CLINIC | Age: 77
End: 2022-03-07
Payer: MEDICARE

## 2022-03-07 VITALS
BODY MASS INDEX: 27.47 KG/M2 | WEIGHT: 149.25 LBS | HEIGHT: 62 IN | TEMPERATURE: 98.2 F | DIASTOLIC BLOOD PRESSURE: 75 MMHG | HEART RATE: 58 BPM | SYSTOLIC BLOOD PRESSURE: 138 MMHG

## 2022-03-07 DIAGNOSIS — G47.62 NOCTURNAL LEG CRAMPS: ICD-10-CM

## 2022-03-07 DIAGNOSIS — Z23 ENCOUNTER FOR IMMUNIZATION: ICD-10-CM

## 2022-03-07 DIAGNOSIS — R53.83 FATIGUE, UNSPECIFIED TYPE: ICD-10-CM

## 2022-03-07 DIAGNOSIS — K21.9 GASTROESOPHAGEAL REFLUX DISEASE, UNSPECIFIED WHETHER ESOPHAGITIS PRESENT: ICD-10-CM

## 2022-03-07 DIAGNOSIS — Z00.00 ROUTINE GENERAL MEDICAL EXAMINATION AT A HEALTH CARE FACILITY: Primary | ICD-10-CM

## 2022-03-07 DIAGNOSIS — K29.70 GASTRITIS WITHOUT BLEEDING, UNSPECIFIED CHRONICITY, UNSPECIFIED GASTRITIS TYPE: ICD-10-CM

## 2022-03-07 DIAGNOSIS — R73.01 IMPAIRED FASTING GLUCOSE: ICD-10-CM

## 2022-03-07 DIAGNOSIS — I10 HYPERTENSION GOAL BP (BLOOD PRESSURE) < 140/90: ICD-10-CM

## 2022-03-07 DIAGNOSIS — E78.5 HYPERLIPIDEMIA LDL GOAL <70: ICD-10-CM

## 2022-03-07 LAB
ALBUMIN SERPL-MCNC: 3.6 G/DL (ref 3.4–5)
ALP SERPL-CCNC: 120 U/L (ref 40–150)
ALT SERPL W P-5'-P-CCNC: 27 U/L (ref 0–70)
ANION GAP SERPL CALCULATED.3IONS-SCNC: 6 MMOL/L (ref 3–14)
AST SERPL W P-5'-P-CCNC: 21 U/L (ref 0–45)
BASOPHILS # BLD AUTO: 0 10E3/UL (ref 0–0.2)
BASOPHILS NFR BLD AUTO: 1 %
BILIRUB SERPL-MCNC: 0.2 MG/DL (ref 0.2–1.3)
BUN SERPL-MCNC: 23 MG/DL (ref 7–30)
CALCIUM SERPL-MCNC: 8.5 MG/DL (ref 8.5–10.1)
CHLORIDE BLD-SCNC: 111 MMOL/L (ref 94–109)
CHOLEST SERPL-MCNC: 123 MG/DL
CO2 SERPL-SCNC: 25 MMOL/L (ref 20–32)
CREAT SERPL-MCNC: 0.91 MG/DL (ref 0.66–1.25)
EOSINOPHIL # BLD AUTO: 0.2 10E3/UL (ref 0–0.7)
EOSINOPHIL NFR BLD AUTO: 3 %
ERYTHROCYTE [DISTWIDTH] IN BLOOD BY AUTOMATED COUNT: 13.3 % (ref 10–15)
FASTING STATUS PATIENT QL REPORTED: NO
GFR SERPL CREATININE-BSD FRML MDRD: 87 ML/MIN/1.73M2
GLUCOSE BLD-MCNC: 102 MG/DL (ref 70–99)
HBA1C MFR BLD: 6.1 % (ref 0–5.6)
HCT VFR BLD AUTO: 36.3 % (ref 40–53)
HDLC SERPL-MCNC: 71 MG/DL
HGB BLD-MCNC: 11.8 G/DL (ref 13.3–17.7)
LDLC SERPL CALC-MCNC: 42 MG/DL
LYMPHOCYTES # BLD AUTO: 1.8 10E3/UL (ref 0.8–5.3)
LYMPHOCYTES NFR BLD AUTO: 31 %
MCH RBC QN AUTO: 30.5 PG (ref 26.5–33)
MCHC RBC AUTO-ENTMCNC: 32.5 G/DL (ref 31.5–36.5)
MCV RBC AUTO: 94 FL (ref 78–100)
MONOCYTES # BLD AUTO: 0.5 10E3/UL (ref 0–1.3)
MONOCYTES NFR BLD AUTO: 8 %
NEUTROPHILS # BLD AUTO: 3.4 10E3/UL (ref 1.6–8.3)
NEUTROPHILS NFR BLD AUTO: 58 %
NONHDLC SERPL-MCNC: 52 MG/DL
PLATELET # BLD AUTO: 232 10E3/UL (ref 150–450)
POTASSIUM BLD-SCNC: 4.1 MMOL/L (ref 3.4–5.3)
PROT SERPL-MCNC: 7 G/DL (ref 6.8–8.8)
RBC # BLD AUTO: 3.87 10E6/UL (ref 4.4–5.9)
SODIUM SERPL-SCNC: 142 MMOL/L (ref 133–144)
TRIGL SERPL-MCNC: 51 MG/DL
TSH SERPL DL<=0.005 MIU/L-ACNC: 3.08 MU/L (ref 0.4–4)
WBC # BLD AUTO: 5.9 10E3/UL (ref 4–11)

## 2022-03-07 PROCEDURE — G0439 PPPS, SUBSEQ VISIT: HCPCS | Performed by: FAMILY MEDICINE

## 2022-03-07 PROCEDURE — 80061 LIPID PANEL: CPT | Performed by: FAMILY MEDICINE

## 2022-03-07 PROCEDURE — 83036 HEMOGLOBIN GLYCOSYLATED A1C: CPT | Performed by: FAMILY MEDICINE

## 2022-03-07 PROCEDURE — 84443 ASSAY THYROID STIM HORMONE: CPT | Performed by: FAMILY MEDICINE

## 2022-03-07 PROCEDURE — 80053 COMPREHEN METABOLIC PANEL: CPT | Performed by: FAMILY MEDICINE

## 2022-03-07 PROCEDURE — 90662 IIV NO PRSV INCREASED AG IM: CPT | Performed by: FAMILY MEDICINE

## 2022-03-07 PROCEDURE — G0008 ADMIN INFLUENZA VIRUS VAC: HCPCS | Performed by: FAMILY MEDICINE

## 2022-03-07 PROCEDURE — 36415 COLL VENOUS BLD VENIPUNCTURE: CPT | Performed by: FAMILY MEDICINE

## 2022-03-07 PROCEDURE — 99214 OFFICE O/P EST MOD 30 MIN: CPT | Mod: 25 | Performed by: FAMILY MEDICINE

## 2022-03-07 PROCEDURE — 85025 COMPLETE CBC W/AUTO DIFF WBC: CPT | Performed by: FAMILY MEDICINE

## 2022-03-07 RX ORDER — ATORVASTATIN CALCIUM 40 MG/1
40 TABLET, FILM COATED ORAL DAILY
Qty: 90 TABLET | Refills: 3 | Status: SHIPPED | OUTPATIENT
Start: 2022-03-07 | End: 2023-03-13

## 2022-03-07 RX ORDER — AMLODIPINE BESYLATE 5 MG/1
5 TABLET ORAL DAILY
Qty: 90 TABLET | Refills: 3 | Status: SHIPPED | OUTPATIENT
Start: 2022-03-07 | End: 2023-04-11

## 2022-03-07 RX ORDER — LISINOPRIL 5 MG/1
5 TABLET ORAL DAILY
Qty: 90 TABLET | Refills: 3 | Status: SHIPPED | OUTPATIENT
Start: 2022-03-07 | End: 2023-03-13

## 2022-03-07 ASSESSMENT — ENCOUNTER SYMPTOMS
CHILLS: 0
HEMATOCHEZIA: 0
PARESTHESIAS: 0
NERVOUS/ANXIOUS: 0
NAUSEA: 0
PALPITATIONS: 0
HEMATURIA: 0
WEAKNESS: 0
MYALGIAS: 1
ARTHRALGIAS: 0
SHORTNESS OF BREATH: 0
DIZZINESS: 0
ABDOMINAL PAIN: 0
SORE THROAT: 0
FREQUENCY: 1
COUGH: 0
EYE PAIN: 0
JOINT SWELLING: 0
FEVER: 0
HEADACHES: 0
CONSTIPATION: 0
DIARRHEA: 0
HEARTBURN: 0

## 2022-03-07 ASSESSMENT — ACTIVITIES OF DAILY LIVING (ADL): CURRENT_FUNCTION: NO ASSISTANCE NEEDED

## 2022-03-07 ASSESSMENT — PAIN SCALES - GENERAL: PAINLEVEL: NO PAIN (0)

## 2022-03-07 NOTE — PATIENT INSTRUCTIONS
Try tonic water at night to help leg cramps    Stay well hydrated    Keep working on healthy diet/exercise     Continue same medications    Decrease caffeine     Follow up as needed based on symptoms

## 2022-03-07 NOTE — PROGRESS NOTES
"SUBJECTIVE:   Donn Allen is a 76 year old male who presents for Preventive Visit.       Patient has been advised of split billing requirements and indicates understanding: Yes  Are you in the first 12 months of your Medicare coverage?  No    Healthy Habits:     In general, how would you rate your overall health?  Good    Frequency of exercise:  4-5 days/week    Duration of exercise:  15-30 minutes    Do you usually eat at least 4 servings of fruit and vegetables a day, include whole grains    & fiber and avoid regularly eating high fat or \"junk\" foods?  No    Taking medications regularly:  Yes    Medication side effects:  Muscle aches    Ability to successfully perform activities of daily living:  No assistance needed    Home Safety:  No safety concerns identified    Hearing Impairment:  Need to ask people to speak up or repeat themselves    In the past 6 months, have you been bothered by leaking of urine? Yes    In general, how would you rate your overall mental or emotional health?  Fair      PHQ-2 Total Score: 0    Additional concerns today:  No    Do you feel safe in your environment? Yes    Have you ever done Advance Care Planning? (For example, a Health Directive, POLST, or a discussion with a medical provider or your loved ones about your wishes): Yes, advance care planning is on file.       Fall risk  Fallen 2 or more times in the past year?: No  Any fall with injury in the past year?: No    Cognitive Screening   1) Repeat 3 items (Leader, Season, Table)     2) Clock draw:   ABNORMAL see picture  3) 3 item recall:   Recalls 2 objects   Results: ABNORMAL clock, 1-2 items recalled: PROBABLE COGNITIVE IMPAIRMENT, **INFORM PROVIDER**    Mini-CogTM Copyright LINDA Leo. Licensed by the author for use in [facility]; reprinted with permission (hardeep@.[facility]). All rights reserved.      Do you have sleep apnea, excessive snoring or daytime drowsiness?: no    Reviewed and updated as needed this visit by " clinical staff   Tobacco  Allergies  Meds   Med Hx  Surg Hx  Fam Hx  Soc Hx        Reviewed and updated as needed this visit by Provider                 Social History     Tobacco Use     Smoking status: Former Smoker     Types: Cigarettes     Quit date: 1972     Years since quittin.1     Smokeless tobacco: Never Used     Tobacco comment: non-smoking household   Substance Use Topics     Alcohol use: Yes     Comment: occ beer / wine     If you drink alcohol do you typically have >3 drinks per day or >7 drinks per week? Yes       No flowsheet data found.             Current providers sharing in care for this patient include:    Patient Care Team:  Ha Anderson MD as PCP - General  PedrazaIesha MD as Referring Physician (Emergency Medicine)  Ha Anderson MD as Assigned PCP  Donn Price MD as Assigned Surgical Provider    The following health maintenance items are reviewed in Epic and correct as of today:  Health Maintenance Due   Topic Date Due     ANNUAL REVIEW OF HM ORDERS  Never done     DTAP/TDAP/TD IMMUNIZATION (1 - Tdap) Never done     ZOSTER IMMUNIZATION (1 of 2) Never done     INFLUENZA VACCINE (1) 2021                Review of Systems   Constitutional: Negative for chills and fever.   HENT: Negative for congestion, ear pain, hearing loss and sore throat.    Eyes: Negative for pain and visual disturbance.   Respiratory: Negative for cough and shortness of breath.    Cardiovascular: Positive for chest pain. Negative for palpitations and peripheral edema.   Gastrointestinal: Negative for abdominal pain, constipation, diarrhea, heartburn, hematochezia and nausea.   Genitourinary: Positive for frequency and urgency. Negative for genital sores, hematuria, impotence and penile discharge.   Musculoskeletal: Positive for myalgias. Negative for arthralgias and joint swelling.   Skin: Negative for rash.   Neurological: Negative for dizziness, weakness, headaches and  "paresthesias.   Psychiatric/Behavioral: Negative for mood changes. The patient is not nervous/anxious.       drinking a lot more coffee than used to   Gets some abd/ chest discomfort  No acid taste    No exertional chest pain at all    If cuts out coffee feels better/ pain goes away     Pain in legs when stretching at night    Not with walking    If gets and walks, pain goes away in legs    [place]    Has two bulldozers, rebuilding that    Building place in [place]    Lots of sleep            OBJECTIVE:   BP (!) 153/79 (BP Location: Right arm, Patient Position: Chair, Cuff Size: Adult Regular)   Pulse 58   Temp 98.2  F (36.8  C) (Temporal)   Ht 1.582 m (5' 2.3\")   Wt 67.7 kg (149 lb 4 oz)   BMI 27.03 kg/m   Estimated body mass index is 27.03 kg/m  as calculated from the following:    Height as of this encounter: 1.582 m (5' 2.3\").    Weight as of this encounter: 67.7 kg (149 lb 4 oz).  Physical Exam  GENERAL: healthy, alert and no distress  EYES: Eyes grossly normal to inspection, PERRL and conjunctivae and sclerae normal  HENT: ear canals and TM's normal, nose and mouth without ulcers or lesions  NECK: no adenopathy, no asymmetry, masses, or scars and thyroid normal to palpation  RESP: lungs clear to auscultation - no rales, rhonchi or wheezes  CV: regular rate and rhythm, normal S1 S2, no S3 or S4, no murmur, click or rub, no peripheral edema and peripheral pulses strong  ABDOMEN: soft, nontender, no hepatosplenomegaly, no masses and bowel sounds normal  MS: no gross musculoskeletal defects noted, no edema  SKIN: no suspicious lesions or rashes  NEURO: Normal strength and tone, mentation intact and speech normal  PSYCH: mentation appears normal, affect normal/bright    Diagnostic Test Results:  Labs reviewed in Epic    ASSESSMENT / PLAN:   Donn was seen today for wellness visit and health maintenance.    Diagnoses and all orders for this visit:    Routine general medical examination at a [facility]" "care facility    Hypertension goal BP (blood pressure) < 140/90  -     amLODIPine (NORVASC) 5 MG tablet; Take 1 tablet (5 mg) by mouth daily  -     lisinopril (ZESTRIL) 5 MG tablet; Take 1 tablet (5 mg) by mouth daily    Hyperlipidemia LDL goal <70  -     atorvastatin (LIPITOR) 40 MG tablet; Take 1 tablet (40 mg) by mouth daily  -     Lipid panel reflex to direct LDL Non-fasting; Future  -     Comprehensive metabolic panel; Future  -     Comprehensive metabolic panel  -     Lipid panel reflex to direct LDL Non-fasting    Gastroesophageal reflux disease, unspecified whether esophagitis present  -     omeprazole (PRILOSEC) 20 MG DR capsule; 1 po daily    Encounter for immunization  -     SD FLU VACCINE, INCREASED ANTIGEN, PRESV FREE ([id number])  -     SCREENING QUESTIONS FOR ADULT IMMUNIZATIONS    Impaired fasting glucose  -     Hemoglobin A1c; Future  -     Hemoglobin A1c    Fatigue, unspecified type  -     TSH with free T4 reflex; Future  -     CBC with Platelets & Differential; Future  -     CBC with Platelets & Differential  -     TSH with free T4 reflex    Nocturnal leg cramps    Gastritis without bleeding, unspecified chronicity, unspecified gastritis type    Discussed multiple issues with patient  Check labs not quite fasting  Refill meds  Try tonic water for the nocturnal leg cramps  Keep working on healthy diet/exercise   Cut down the coffee/acid beverages   Continue ppi  Follow up prn symptoms  He has no exertional symptoms at all     Patient has been advised of split billing requirements and indicates understanding: Yes    COUNSELING:  Reviewed preventive health counseling, as reflected in patient instructions       Regular exercise       Healthy diet/nutrition       Vision screening       Colon cancer screening       Prostate cancer screening    Estimated body mass index is 27.03 kg/m  as calculated from the following:    Height as of this encounter: 1.582 m (5' 2.3\").    Weight as of this encounter: 67.7 kg " (149 lb 4 oz).    Weight management plan: Discussed healthy diet and exercise guidelines    He reports that he quit smoking about 50 years ago. His smoking use included cigarettes. He has never used smokeless tobacco.      Appropriate preventive services were discussed with this patient, including applicable screening as appropriate for cardiovascular disease, diabetes, osteopenia/osteoporosis, and glaucoma.  As appropriate for age/gender, discussed screening for colorectal cancer, prostate cancer, breast cancer, and cervical cancer. Checklist reviewing preventive services available has been given to the patient.    Reviewed patients plan of care and provided an AVS. The Basic Care Plan (routine screening as documented in Health Maintenance) for Donn meets the Care Plan requirement. This Care Plan has been established and reviewed with the Patient.    Counseling Resources:  ATP IV Guidelines  Pooled Cohorts Equation Calculator  Breast Cancer Risk Calculator  Breast Cancer: Medication to Reduce Risk  FRAX Risk Assessment  ICSI Preventive Guidelines  Dietary Guidelines for Americans, 2010  USDA's MyPlate  ASA Prophylaxis  Lung CA Screening    Ha Anderson MD  [facility]    Identified Health Risks:

## 2022-03-08 NOTE — RESULT ENCOUNTER NOTE
"The hemoglobin a1c is into the \"prediabetes\" range.  No medication needed for this.  Just keep working on healthy diet/exercise.     The hemoglobin ( red blood count ) is lower than last time.  This should be rechecked in a few months.    Other labs are fine.    Ha Anderson MD  "

## 2022-04-12 NOTE — TELEPHONE ENCOUNTER
Prescription approved per [facility] Refill Protocol.  Janessa Jerez, PharmD  Medication Therapy Management Pharmacist  [phone]     [Large Joint Injection] : Large joint injection [Right] : of the right [Knee] : knee [Pain] : pain [Inflammation] : inflammation [X-ray evidence of Osteoarthritis on this or prior visit] : x-ray evidence of Osteoarthritis on this or prior visit [Betadine] : betadine [Ethyl Chloride sprayed topically] : ethyl chloride sprayed topically [Sterile technique used] : sterile technique used [___ cc    6mg] :  Betamethasone (Celestone) ~Vcc of 6mg [___ cc    0.5%] : Bupivacaine (Marcaine) ~Vcc of 0.5%  [Call if redness, pain or fever occur] : call if redness, pain or fever occur [Apply ice for 15min out of every hour for the next 12-24 hours as tolerated] : apply ice for 15 minutes out of every hour for the next 12-24 hours as tolerated [Previous OTC use and PT nontherapeutic] : patient has tried OTC's including aspirin, Ibuprofen, Aleve, etc or prescription NSAIDS, and/or exercises at home and/or physical therapy without satisfactory response [Patient had decreased mobility in the joint] : patient had decreased mobility in the joint [Risks, benefits, alternatives discussed / Verbal consent obtained] : the risks benefits, and alternatives have been discussed, and verbal consent was obtained [Precise injection in area of tear] : precise injection in area of tear [All ultrasound images have been permanently captured and stored accordingly in our picture archiving and communication system] : All ultrasound images have been permanently captured and stored accordingly in our picture archiving and communication system [Visualization of the needle and placement of injection was performed without complication] : visualization of the needle and placement of injection was performed without complication

## 2022-04-20 ENCOUNTER — OFFICE VISIT (OUTPATIENT)
Dept: OPHTHALMOLOGY | Facility: CLINIC | Age: 77
End: 2022-04-20
Payer: MEDICARE

## 2022-04-20 DIAGNOSIS — H35.371 EPIRETINAL MEMBRANE, RIGHT EYE: ICD-10-CM

## 2022-04-20 DIAGNOSIS — H43.812 POSTERIOR VITREOUS DETACHMENT OF LEFT EYE: ICD-10-CM

## 2022-04-20 DIAGNOSIS — H40.001 GLAUCOMA SUSPECT OF RIGHT EYE: ICD-10-CM

## 2022-04-20 DIAGNOSIS — H35.412 LATTICE DEGENERATION OF LEFT RETINA: ICD-10-CM

## 2022-04-20 DIAGNOSIS — H52.4 PRESBYOPIA: ICD-10-CM

## 2022-04-20 DIAGNOSIS — H27.01 APHAKIA OF RIGHT EYE: Primary | ICD-10-CM

## 2022-04-20 DIAGNOSIS — Z98.890 HISTORY OF VITRECTOMY: ICD-10-CM

## 2022-04-20 DIAGNOSIS — Z01.01 ENCOUNTER FOR EXAMINATION OF EYES AND VISION WITH ABNORMAL FINDINGS: ICD-10-CM

## 2022-04-20 DIAGNOSIS — Z96.1 PSEUDOPHAKIA: ICD-10-CM

## 2022-04-20 PROCEDURE — 92014 COMPRE OPH EXAM EST PT 1/>: CPT | Performed by: OPHTHALMOLOGY

## 2022-04-20 PROCEDURE — 92015 DETERMINE REFRACTIVE STATE: CPT | Mod: GY | Performed by: OPHTHALMOLOGY

## 2022-04-20 ASSESSMENT — EXTERNAL EXAM - RIGHT EYE: OD_EXAM: 3+ BROW PTOSIS

## 2022-04-20 ASSESSMENT — REFRACTION_MANIFEST
OD_CYLINDER: +0.50
OD_ADD: +2.75
OS_CYLINDER: +0.50
OS_AXIS: 180
OS_SPHERE: -2.25
OD_SPHERE: +8.00
OD_AXIS: 080
OS_ADD: +2.75

## 2022-04-20 ASSESSMENT — VISUAL ACUITY
METHOD: SNELLEN - LINEAR
CORRECTION_TYPE: GLASSES
OS_CC: 20/20
OD_CC: CF @ 2'

## 2022-04-20 ASSESSMENT — REFRACTION_WEARINGRX
OS_CYLINDER: +0.75
SPECS_TYPE: BIFOCAL
OD_SPHERE: PLANO
OS_SPHERE: -2.25
OS_AXIS: 047
OD_ADD: +3.00
OS_ADD: +3.00
OD_CYLINDER: SPHERE

## 2022-04-20 ASSESSMENT — TONOMETRY
OS_IOP_MMHG: 12
OD_IOP_MMHG: 14
IOP_METHOD: APPLANATION

## 2022-04-20 ASSESSMENT — SLIT LAMP EXAM - LIDS
COMMENTS: 1+ DERMATOCHALASIS - UPPER LID
COMMENTS: 1+ DERMATOCHALASIS - UPPER LID

## 2022-04-20 ASSESSMENT — CONF VISUAL FIELD
OS_NORMAL: 1
OD_SUPERIOR_NASAL_RESTRICTION: 1

## 2022-04-20 ASSESSMENT — CUP TO DISC RATIO
OD_RATIO: 0.9
OS_RATIO: 0.4

## 2022-04-20 ASSESSMENT — EXTERNAL EXAM - LEFT EYE: OS_EXAM: 3+ BROW PTOSIS

## 2022-04-20 NOTE — LETTER
4/20/2022         RE: Donn Allen  [address] [place] [address]        Dear Colleague,    Thank you for referring your patient, Donn Allen, to the [facility]. Please see a copy of my visit note below.     Current Eye Medications:  Timolol every morning right eye, yesterday at 7:30 am. Forgot to take this morning, doesn't usually forget.     Subjective:  Complete eye exam. Vision is poor right eye, been that way for long time. Vision is doing well left eye in the distance and up close. Patient itches eyes a lot, knows he is not supposed to. No eye pain in either eye.      Objective:  See Ophthalmology Exam.       Assessment:  Stable eye exam.  Intraocular pressure modestly improved with Timolol right eye, but no drop this morning.      ICD-10-CM    1. Aphakia of right eye  H27.01    2. Pseudophakia, os  Z96.1    3. History of vitrectomy, od   Z98.890    4. 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 Epiretinal membrane, right eye  H35.371    6. 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 Posterior vitreous detachment of left eye  H43.812    8. Encounter for examination of eyes and vision with abnormal findings  Z01.01    9. Presbyopia  H52.4 EYE EXAM (SIMPLE-NONBILLABLE)     REFRACTION        Plan:  Glasses Rx given - optional  Continue same medications.  May use artificial tears up to 4 times daily both eyes. (Refresh Tears, Systane Ultra/Balance, or Theratears)   Possible clouding of posterior capsule left eye discussed.  Possible posterior vitreous detachment (sudden onset large floater and/or flashing lights) right eye discussed.   Return visit 6 months for an intraocular pressure check, glaucoma OCT, retinal OCT, and Hurt Visual Field.   Donn Price M.D.  [phone]            Again, thank you for allowing me to participate in the care of your patient.        Sincerely,        Donn Price MD

## 2022-04-20 NOTE — PROGRESS NOTES
Current Eye Medications:  Timolol every morning right eye, yesterday at 7:30 am. Forgot to take this morning, doesn't usually forget.     Subjective:  Complete eye exam. Vision is poor right eye, been that way for long time. Vision is doing well left eye in the distance and up close. Patient itches eyes a lot, knows he is not supposed to. No eye pain in either eye.      Objective:  See Ophthalmology Exam.       Assessment:  Stable eye exam.  Intraocular pressure modestly improved with Timolol right eye, but no drop this morning.      ICD-10-CM    1. Aphakia of right eye  H27.01    2. Pseudophakia, os  Z96.1    3. History of vitrectomy, od   Z98.890    4. 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 Epiretinal membrane, right eye  H35.371    6. 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 Posterior vitreous detachment of left eye  H43.812    8. Encounter for examination of eyes and vision with abnormal findings  Z01.01    9. Presbyopia  H52.4 EYE EXAM (SIMPLE-NONBILLABLE)     REFRACTION        Plan:  Glasses Rx given - optional  Continue same medications.  May use artificial tears up to 4 times daily both eyes. (Refresh Tears, Systane Ultra/Balance, or Theratears)   Possible clouding of posterior capsule left eye discussed.  Possible posterior vitreous detachment (sudden onset large floater and/or flashing lights) right eye discussed.   Return visit 6 months for an intraocular pressure check, glaucoma OCT, retinal OCT, and Hurt Visual Field.   Donn Price M.D.  [phone]

## 2022-04-20 NOTE — PATIENT INSTRUCTIONS
Glasses Rx given - optional  Continue same medications.  May use artificial tears up to 4 times daily both eyes. (Refresh Tears, Systane Ultra/Balance, or Theratears)   Possible clouding of posterior capsule left eye discussed.  Possible posterior vitreous detachment (sudden onset large floater and/or flashing lights) right eye discussed.   Return visit 6 months for an intraocular pressure check, glaucoma OCT, retinal OCT, and Hurt Visual Field.   Donn Price M.D.  [phone]

## 2022-07-13 ENCOUNTER — TELEPHONE (OUTPATIENT)
Dept: OPHTHALMOLOGY | Facility: CLINIC | Age: 77
End: 2022-07-13

## 2022-07-13 NOTE — TELEPHONE ENCOUNTER
Headache with short sharp pain above right eye (above eye brow) that comes and goes for last two weeks.  Patient is on [facility] on [place]. Dr. Price recommended patient be seen. Donn will go to [facility] for inflammation and pressure check.

## 2022-07-13 NOTE — TELEPHONE ENCOUNTER
Patient called regarding his eyedrop RX Timolol . Patient developed a sharp pain in right eye. He is up in [place] and could not come in for an appointment. He is asking for return phone call to discuss symptoms. PHONE: [phone]. Please L/M if you can not contact him directly

## 2022-08-12 ENCOUNTER — TELEPHONE (OUTPATIENT)
Dept: OPHTHALMOLOGY | Facility: CLINIC | Age: 77
End: 2022-08-12

## 2022-08-12 NOTE — TELEPHONE ENCOUNTER
Patient is requesting last appointment notes to be faxed to [facility], Fax number : [phone]. Any questions please call patient at [phone].

## 2022-10-23 ENCOUNTER — HEALTH MAINTENANCE LETTER (OUTPATIENT)
Age: 77
End: 2022-10-23

## 2022-11-01 NOTE — PATIENT INSTRUCTIONS
Continue Timolol (yellow top) every morning right eye.  Return visit in 6 months for a complete exam.   Donn Price M.D.  [phone]

## 2022-11-02 ENCOUNTER — OFFICE VISIT (OUTPATIENT)
Dept: OPHTHALMOLOGY | Facility: CLINIC | Age: 77
End: 2022-11-02
Payer: MEDICARE

## 2022-11-02 DIAGNOSIS — H35.371 EPIRETINAL MEMBRANE, RIGHT EYE: ICD-10-CM

## 2022-11-02 DIAGNOSIS — H40.001 GLAUCOMA SUSPECT OF RIGHT EYE: Primary | ICD-10-CM

## 2022-11-02 PROCEDURE — 92134 CPTRZ OPH DX IMG PST SGM RTA: CPT | Performed by: OPHTHALMOLOGY

## 2022-11-02 PROCEDURE — 92012 INTRM OPH EXAM EST PATIENT: CPT | Performed by: OPHTHALMOLOGY

## 2022-11-02 PROCEDURE — 92083 EXTENDED VISUAL FIELD XM: CPT | Performed by: OPHTHALMOLOGY

## 2022-11-02 RX ORDER — TIMOLOL MALEATE 5 MG/ML
1 SOLUTION/ DROPS OPHTHALMIC DAILY
Qty: 5 ML | Refills: 11 | Status: SHIPPED | OUTPATIENT
Start: 2022-11-02 | End: 2024-04-02

## 2022-11-02 ASSESSMENT — VISUAL ACUITY
CORRECTION_TYPE: GLASSES
OS_CC: 20/20
METHOD: SNELLEN - LINEAR
OD_CC: CF@5

## 2022-11-02 ASSESSMENT — EXTERNAL EXAM - LEFT EYE: OS_EXAM: 3+ BROW PTOSIS

## 2022-11-02 ASSESSMENT — SLIT LAMP EXAM - LIDS
COMMENTS: 1+ DERMATOCHALASIS - UPPER LID
COMMENTS: 1+ DERMATOCHALASIS - UPPER LID

## 2022-11-02 ASSESSMENT — TONOMETRY
IOP_METHOD: APPLANATION
OD_IOP_MMHG: 11
OS_IOP_MMHG: 11

## 2022-11-02 ASSESSMENT — EXTERNAL EXAM - RIGHT EYE: OD_EXAM: 3+ BROW PTOSIS

## 2022-11-02 NOTE — PROGRESS NOTES
Current Eye Medications:  Timoptic daily right eye     Subjective: here for HVF and OCT mac and nerve for glaucoma. Needs refills sent today. Nothing has changed with the vision, but this summer had brow headache over the right eye. Had a pressure check in [place], it was a little high then.  Hasn't missed drops for quite awhile.      Objective:  See Ophthalmology Exam.       Assessment:  Stable intraocular pressure, glaucoma OCT, retinal OCT and Hurt Visual Field both eyes in patient who is a treated glaucoma suspect of the right eye.      Plan:  Continue Timolol (yellow top) every morning right eye.  Return visit in 6 months for a complete exam.   Donn Price M.D.  [phone]

## 2022-11-02 NOTE — LETTER
11/2/2022         RE: Donn Allen  [address] [facility] [address]        Dear Colleague,    Thank you for referring your patient, Donn Allen, to the [facility]. Please see a copy of my visit note below.     Current Eye Medications:  Timoptic daily right eye     Subjective: here for HVF and OCT mac and nerve for glaucoma. Needs refills sent today. Nothing has changed with the vision, but this summer had brow headache over the right eye. Had a pressure check in [facility], it was a little high then.  Hasn't missed drops for quite awhile.      Objective:  See Ophthalmology Exam.       Assessment:  Stable intraocular pressure, glaucoma OCT, retinal OCT and Hurt Visual Field both eyes in patient who is a treated glaucoma suspect of the right eye.      Plan:  Continue Timolol (yellow top) every morning right eye.  Return visit in 6 months for a complete exam.   Donn Price M.D.  [phone]          Again, thank you for allowing me to participate in the care of your patient.        Sincerely,        Donn Price MD

## 2022-11-03 ASSESSMENT — PACHYMETRY
OS_CT(UM): .559
EXAM_DATE: 12/5/2016
OD_CT(UM): .558

## 2022-11-17 ENCOUNTER — OFFICE VISIT (OUTPATIENT)
Dept: FAMILY MEDICINE | Facility: CLINIC | Age: 77
End: 2022-11-17
Payer: MEDICARE

## 2022-11-17 ENCOUNTER — HOSPITAL ENCOUNTER (OUTPATIENT)
Facility: HOSPITAL | Age: 77
Setting detail: OBSERVATION
Discharge: HOME | End: 2022-11-18
Attending: STUDENT IN AN ORGANIZED HEALTH CARE EDUCATION/TRAINING PROGRAM | Admitting: STUDENT IN AN ORGANIZED HEALTH CARE EDUCATION/TRAINING PROGRAM
Payer: MEDICARE

## 2022-11-17 ENCOUNTER — APPOINTMENT (OUTPATIENT)
Dept: RADIOLOGY | Facility: HOSPITAL | Age: 77
End: 2022-11-17
Attending: EMERGENCY MEDICINE
Payer: MEDICARE

## 2022-11-17 VITALS
RESPIRATION RATE: 16 BRPM | HEIGHT: 62 IN | BODY MASS INDEX: 23.37 KG/M2 | SYSTOLIC BLOOD PRESSURE: 146 MMHG | HEART RATE: 63 BPM | DIASTOLIC BLOOD PRESSURE: 68 MMHG | OXYGEN SATURATION: 98 % | TEMPERATURE: 98.1 F | WEIGHT: 127 LBS

## 2022-11-17 DIAGNOSIS — R07.9 CHEST PAIN, UNSPECIFIED TYPE: ICD-10-CM

## 2022-11-17 DIAGNOSIS — R07.9 CHEST PAIN, UNSPECIFIED TYPE: Primary | ICD-10-CM

## 2022-11-17 LAB
ANION GAP SERPL CALCULATED.3IONS-SCNC: 9 MMOL/L (ref 7–15)
ATRIAL RATE - MUSE: 60 BPM
BASOPHILS # BLD AUTO: 0 10E3/UL (ref 0–0.2)
BASOPHILS NFR BLD AUTO: 0 %
BUN SERPL-MCNC: 27.5 MG/DL (ref 8–23)
CALCIUM SERPL-MCNC: 8.8 MG/DL (ref 8.8–10.2)
CHLORIDE SERPL-SCNC: 104 MMOL/L (ref 98–107)
CREAT SERPL-MCNC: 0.83 MG/DL (ref 0.67–1.17)
DEPRECATED HCO3 PLAS-SCNC: 27 MMOL/L (ref 22–29)
DIASTOLIC BLOOD PRESSURE - MUSE: NORMAL MMHG
EOSINOPHIL # BLD AUTO: 0.2 10E3/UL (ref 0–0.7)
EOSINOPHIL NFR BLD AUTO: 3 %
ERYTHROCYTE [DISTWIDTH] IN BLOOD BY AUTOMATED COUNT: 12.3 % (ref 10–15)
GFR SERPL CREATININE-BSD FRML MDRD: 90 ML/MIN/1.73M2
GLUCOSE SERPL-MCNC: 111 MG/DL (ref 70–99)
HCT VFR BLD AUTO: 37.4 % (ref 40–53)
HGB BLD-MCNC: 12.1 G/DL (ref 13.3–17.7)
IMM GRANULOCYTES # BLD: 0 10E3/UL
IMM GRANULOCYTES NFR BLD: 0 %
INTERPRETATION ECG - MUSE: NORMAL
LYMPHOCYTES # BLD AUTO: 2.2 10E3/UL (ref 0.8–5.3)
LYMPHOCYTES NFR BLD AUTO: 31 %
MCH RBC QN AUTO: 30.3 PG (ref 26.5–33)
MCHC RBC AUTO-ENTMCNC: 32.4 G/DL (ref 31.5–36.5)
MCV RBC AUTO: 94 FL (ref 78–100)
MONOCYTES # BLD AUTO: 0.6 10E3/UL (ref 0–1.3)
MONOCYTES NFR BLD AUTO: 8 %
NEUTROPHILS # BLD AUTO: 4.2 10E3/UL (ref 1.6–8.3)
NEUTROPHILS NFR BLD AUTO: 58 %
NRBC # BLD AUTO: 0 10E3/UL
NRBC BLD AUTO-RTO: 0 /100
NT-PROBNP SERPL-MCNC: 79 PG/ML (ref 0–1800)
P AXIS - MUSE: 13 DEGREES
PLATELET # BLD AUTO: 268 10E3/UL (ref 150–450)
POTASSIUM SERPL-SCNC: 4.3 MMOL/L (ref 3.4–5.3)
PR INTERVAL - MUSE: 128 MS
QRS DURATION - MUSE: 82 MS
QT - MUSE: 384 MS
QTC - MUSE: 384 MS
R AXIS - MUSE: 30 DEGREES
RBC # BLD AUTO: 4 10E6/UL (ref 4.4–5.9)
SARS-COV-2 RNA RESP QL NAA+PROBE: NEGATIVE
SODIUM SERPL-SCNC: 140 MMOL/L (ref 136–145)
SYSTOLIC BLOOD PRESSURE - MUSE: NORMAL MMHG
T AXIS - MUSE: 34 DEGREES
TROPONIN T SERPL HS-MCNC: 7 NG/L
TROPONIN T SERPL HS-MCNC: 7 NG/L
VENTRICULAR RATE- MUSE: 60 BPM
WBC # BLD AUTO: 7.1 10E3/UL (ref 4–11)

## 2022-11-17 PROCEDURE — 36415 COLL VENOUS BLD VENIPUNCTURE: CPT | Performed by: STUDENT IN AN ORGANIZED HEALTH CARE EDUCATION/TRAINING PROGRAM

## 2022-11-17 PROCEDURE — U0005 INFEC AGEN DETEC AMPLI PROBE: HCPCS | Performed by: EMERGENCY MEDICINE

## 2022-11-17 PROCEDURE — 85025 COMPLETE CBC W/AUTO DIFF WBC: CPT | Performed by: EMERGENCY MEDICINE

## 2022-11-17 PROCEDURE — 99219 PR INITIAL OBSERVATION CARE,LEVEL II: CPT | Performed by: INTERNAL MEDICINE

## 2022-11-17 PROCEDURE — 93005 ELECTROCARDIOGRAM TRACING: CPT | Performed by: EMERGENCY MEDICINE

## 2022-11-17 PROCEDURE — 93005 ELECTROCARDIOGRAM TRACING: CPT | Performed by: FAMILY MEDICINE

## 2022-11-17 PROCEDURE — 84484 ASSAY OF TROPONIN QUANT: CPT | Performed by: STUDENT IN AN ORGANIZED HEALTH CARE EDUCATION/TRAINING PROGRAM

## 2022-11-17 PROCEDURE — C9803 HOPD COVID-19 SPEC COLLECT: HCPCS

## 2022-11-17 PROCEDURE — 99215 OFFICE O/P EST HI 40 MIN: CPT | Performed by: FAMILY MEDICINE

## 2022-11-17 PROCEDURE — G0378 HOSPITAL OBSERVATION PER HR: HCPCS

## 2022-11-17 PROCEDURE — 84484 ASSAY OF TROPONIN QUANT: CPT | Performed by: EMERGENCY MEDICINE

## 2022-11-17 PROCEDURE — 36415 COLL VENOUS BLD VENIPUNCTURE: CPT | Performed by: EMERGENCY MEDICINE

## 2022-11-17 PROCEDURE — 250N000013 HC RX MED GY IP 250 OP 250 PS 637: Performed by: INTERNAL MEDICINE

## 2022-11-17 PROCEDURE — 93010 ELECTROCARDIOGRAM REPORT: CPT | Mod: OFF | Performed by: INTERNAL MEDICINE

## 2022-11-17 PROCEDURE — 71045 X-RAY EXAM CHEST 1 VIEW: CPT

## 2022-11-17 PROCEDURE — 83880 ASSAY OF NATRIURETIC PEPTIDE: CPT | Performed by: STUDENT IN AN ORGANIZED HEALTH CARE EDUCATION/TRAINING PROGRAM

## 2022-11-17 PROCEDURE — 80048 BASIC METABOLIC PNL TOTAL CA: CPT | Performed by: EMERGENCY MEDICINE

## 2022-11-17 PROCEDURE — 99285 EMERGENCY DEPT VISIT HI MDM: CPT | Mod: 25

## 2022-11-17 RX ORDER — MAGNESIUM HYDROXIDE/ALUMINUM HYDROXICE/SIMETHICONE 120; 1200; 1200 MG/30ML; MG/30ML; MG/30ML
30 SUSPENSION ORAL EVERY 4 HOURS PRN
Status: DISCONTINUED | OUTPATIENT
Start: 2022-11-17 | End: 2022-11-18 | Stop reason: HOSPADM

## 2022-11-17 RX ORDER — ASPIRIN 81 MG/1
324 TABLET, CHEWABLE ORAL ONCE
Status: COMPLETED | OUTPATIENT
Start: 2022-11-17 | End: 2022-11-17

## 2022-11-17 RX ORDER — PANTOPRAZOLE SODIUM 40 MG/1
40 TABLET, DELAYED RELEASE ORAL
Status: DISCONTINUED | OUTPATIENT
Start: 2022-11-18 | End: 2022-11-18 | Stop reason: HOSPADM

## 2022-11-17 RX ORDER — LISINOPRIL 5 MG/1
5 TABLET ORAL DAILY
Status: DISCONTINUED | OUTPATIENT
Start: 2022-11-18 | End: 2022-11-17

## 2022-11-17 RX ORDER — TIMOLOL MALEATE 5 MG/ML
1 SOLUTION/ DROPS OPHTHALMIC DAILY
Status: DISCONTINUED | OUTPATIENT
Start: 2022-11-18 | End: 2022-11-18 | Stop reason: HOSPADM

## 2022-11-17 RX ORDER — ASPIRIN 325 MG
325 TABLET ORAL ONCE
Status: DISCONTINUED | OUTPATIENT
Start: 2022-11-17 | End: 2022-11-17

## 2022-11-17 RX ORDER — NITROGLYCERIN 0.4 MG/1
0.4 TABLET SUBLINGUAL EVERY 5 MIN PRN
Status: DISCONTINUED | OUTPATIENT
Start: 2022-11-17 | End: 2022-11-18

## 2022-11-17 RX ORDER — LISINOPRIL 5 MG/1
5 TABLET ORAL EVERY EVENING
Status: DISCONTINUED | OUTPATIENT
Start: 2022-11-17 | End: 2022-11-18 | Stop reason: HOSPADM

## 2022-11-17 RX ORDER — NITROGLYCERIN 0.4 MG/1
0.4 TABLET SUBLINGUAL EVERY 5 MIN PRN
Status: DISCONTINUED | OUTPATIENT
Start: 2022-11-17 | End: 2022-11-18 | Stop reason: HOSPADM

## 2022-11-17 RX ORDER — AMLODIPINE BESYLATE 5 MG/1
5 TABLET ORAL EVERY EVENING
Status: DISCONTINUED | OUTPATIENT
Start: 2022-11-17 | End: 2022-11-18 | Stop reason: HOSPADM

## 2022-11-17 RX ORDER — ATORVASTATIN CALCIUM 40 MG/1
40 TABLET, FILM COATED ORAL EVERY EVENING
Status: DISCONTINUED | OUTPATIENT
Start: 2022-11-17 | End: 2022-11-18 | Stop reason: HOSPADM

## 2022-11-17 RX ORDER — AMLODIPINE BESYLATE 5 MG/1
5 TABLET ORAL DAILY
Status: DISCONTINUED | OUTPATIENT
Start: 2022-11-18 | End: 2022-11-17

## 2022-11-17 RX ORDER — ACETAMINOPHEN 325 MG/1
650 TABLET ORAL EVERY 6 HOURS PRN
Status: DISCONTINUED | OUTPATIENT
Start: 2022-11-17 | End: 2022-11-18 | Stop reason: HOSPADM

## 2022-11-17 RX ORDER — ASPIRIN 81 MG/1
81 TABLET ORAL DAILY
Status: DISCONTINUED | OUTPATIENT
Start: 2022-11-18 | End: 2022-11-18 | Stop reason: HOSPADM

## 2022-11-17 RX ORDER — ACETAMINOPHEN 650 MG/1
650 SUPPOSITORY RECTAL EVERY 6 HOURS PRN
Status: DISCONTINUED | OUTPATIENT
Start: 2022-11-17 | End: 2022-11-18 | Stop reason: HOSPADM

## 2022-11-17 RX ORDER — ASPIRIN 81 MG/1
81 TABLET, CHEWABLE ORAL ONCE
Status: CANCELLED | OUTPATIENT
Start: 2022-11-17 | End: 2022-11-17

## 2022-11-17 RX ADMIN — ASPIRIN 324 MG: 81 TABLET, CHEWABLE ORAL at 16:40

## 2022-11-17 RX ADMIN — LISINOPRIL 5 MG: 5 TABLET ORAL at 22:35

## 2022-11-17 RX ADMIN — ATORVASTATIN CALCIUM 40 MG: 40 TABLET, FILM COATED ORAL at 22:36

## 2022-11-17 RX ADMIN — AMLODIPINE BESYLATE 5 MG: 5 TABLET ORAL at 22:36

## 2022-11-17 ASSESSMENT — PATIENT HEALTH QUESTIONNAIRE - PHQ9
10. IF YOU CHECKED OFF ANY PROBLEMS, HOW DIFFICULT HAVE THESE PROBLEMS MADE IT FOR YOU TO DO YOUR WORK, TAKE CARE OF THINGS AT HOME, OR GET ALONG WITH OTHER PEOPLE: NOT DIFFICULT AT ALL
SUM OF ALL RESPONSES TO PHQ QUESTIONS 1-9: 2
SUM OF ALL RESPONSES TO PHQ QUESTIONS 1-9: 2

## 2022-11-17 ASSESSMENT — ACTIVITIES OF DAILY LIVING (ADL)
ADLS_ACUITY_SCORE: 35

## 2022-11-17 NOTE — ED TRIAGE NOTES
amb to triage.  Pt referred to ED by clinic for CP. Pt states has been having pain for about 1 week in R uper chest and to mid sternal area.  States has hx of 2 stents.  Pt unable to describe pain.  BP elevated in triage.  Pt denies SOB or HA.       Triage Assessment     Row Name 11/17/22 [id number]       Triage Assessment (Adult)    Airway WDL WDL       Respiratory WDL    Respiratory WDL WDL       Skin Circulation/Temperature WDL    Skin Circulation/Temperature WDL WDL       Cardiac WDL    Cardiac WDL X;chest pain       Chest Pain Assessment    Chest Pain Location midsternal       Peripheral/Neurovascular WDL    Peripheral Neurovascular WDL WDL       Cognitive/Neuro/Behavioral WDL    Cognitive/Neuro/Behavioral WDL WDL

## 2022-11-17 NOTE — ED PROVIDER NOTES
Emergency Department Encounter         FINAL IMPRESSION:  CHEST PAIN        ED COURSE AND MEDICAL DECISION MAKING       ED Course as of 11/17/22 1930   Thu Nov 17, 2022   4178 EKG is sinus rhythm, no acute signs of ischemia, no inversions no depressions no STEMI criteria , essentially unchanged from previous EKG.   1928 77 male history of hypertension hyperlipidemia and 2 stents that were placed in 2014, here with chest pain for the past 5 days.  Difficult to determine whether or not the chest pain is exactly exertional.  No fevers, chills, nausea or vomiting.  States that he has no associated symptoms with the chest pain including diaphoresis shortness of breath or presyncope.  However he did remember that the pain got worse today after shoveling.  Went to the doctor.  Sent here for further evaluation.  Arrival patient is asymptomatic.  Vitals are stable.  Labs out of triage are unremarkable.   -Labs reassuring.  Chest x-ray is clear.  Patient's heart score is 5 at this time.  Plan for admission.      7:15 PM I met with the patient to gather history and to perform my initial exam. I discussed the plan for care while in the Emergency Department.   8:52 PM Spoke with hospitalist, Dr. Julio.     Medical Decision Making    Supplemental history from: N/A    External Record(s) Reviewed: N/A    Differential Diagnosis: See MDM charting for differential considered.     I performed an independent interpretation of the: EKG: See my documentation.    Discussed with radiology regarding test interpretation: N/A    Discussion of management with another provider: See chart documentation, if applicable and Hospitalist    The following testing was considered but ultimately not selected: None    I considered prescription management with: N/A    The patient's care impacted: None    Consideration of Admission/Observation: N/A - Patient admitted    Care significantly affected by Social Determinants of Health including: N/A       At the conclusion of the encounter I discussed the results of all the tests and the disposition. The questions were answered. The patient or family acknowledged understanding and was agreeable with the care plan.      MEDICATIONS GIVEN IN THE EMERGENCY DEPARTMENT:  Medications - No data to display    NEW PRESCRIPTIONS STARTED AT TODAY'S ED VISIT:  New Prescriptions    No medications on file       HPI     Patient information obtained from: Patient    Use of Interpretor: N/A     Donn Allen is a 77 year old male with a pertinent history of CAD, hyperlipidemia, hypertension who presents to this ED by walk-in for evaluation of chest pain.    Patient endorses having off and on chest pain that started on his right side and migrated to the center. He notes it does not radiate to his shoulders or arms. Patient states it worsens when he is walking, and gets better when he sits down. He also states the chest pain doesn't worsen when he coughs or pushes on his chest. Today, patient was shoveling his driveway when the pain worsened, which is why he presented today. Patient states other than the chest pain he feels fine. Patient endorses a history of stents and heart disease. Patient notes he takes aspirin daily. Patient denies taking blood thinners. Patient denies shortness of breath, or any other complaints at this time.     REVIEW OF SYSTEMS:  Review of Systems   Constitutional: Negative for fever, malaise  HEENT: Negative runny nose, sore throat, ear pain, neck pain  Respiratory: Negative for shortness of breath, cough, congestion  Cardiovascular: Negative for leg edema. Positive for chest pain   Gastrointestinal: Negative for abdominal distention, abdominal pain, constipation, vomiting, nausea, diarrhea  Genitourinary: Negative for dysuria and hematuria.   Integument: Negative for rash, skin breakdown  Neurological: Negative for paresthesias, weakness, headache.  Musculoskeletal: Negative for joint pain, joint  swelling      All other systems reviewed and are negative.          MEDICAL HISTORY     Past Medical History:   Diagnosis Date     Aphakia      Arthritis      CAD S/P percutaneous coronary angioplasty 2014     Combined form of age-related cataract, left eye 10/24/2017     Coronary artery disease      Environmental allergies      Glaucoma (increased eye pressure)      Hypertension goal BP (blood pressure) < 140/90 2017     Lightheadedness 2015     Lightheadedness      Retinal detachment 1990s     Strabismus        Past Surgical History:   Procedure Laterality Date     CARDIAC CATHERIZATION  2014    2 stents      CATARACT IOL, RT/LT       COLONOSCOPY  3/18/2014    Procedure: COLONOSCOPY;  COLONOSCOPY SCREEN/ EGD UPPER GI ENDOSCOPY/ LOSS OF WEIGHT/ DEAL;  Surgeon: Nick Underwood MD;  Location: [facility] OR     DESTRUC RETINAL LESN,CRYOTHERAPY Right      ESOPHAGOSCOPY, GASTROSCOPY, DUODENOSCOPY (EGD), COMBINED  3/18/2014    Procedure: COMBINED ESOPHAGOSCOPY, GASTROSCOPY, DUODENOSCOPY (EGD), BIOPSY SINGLE OR MULTIPLE;;  Surgeon: Nick Underwood MD;  Location: [facility] OR     KNEE SURGERY      left meniscus 2019     PHACOEMULSIFICATION WITH STANDARD INTRAOCULAR LENS IMPLANT  2017    left eye     RETINAL REATTACHMENT       ROTATOR CUFF REPAIR RT/LT Bilateral      VASECTOMY       Z FOOT/TOES SURGERY PROC UNLISTED Right     trauma     Z STOMACH SURGERY PROCEDURE UNLISTED      Partial gastrectomy-h/o ulcers       Social History     Tobacco Use     Smoking status: Former     Types: Cigarettes     Quit date: 1972     Years since quittin.8     Smokeless tobacco: Never     Tobacco comments:     non-smoking household   Substance Use Topics     Alcohol use: Yes     Comment: occ beer / wine     Drug use: No       amLODIPine (NORVASC) 5 MG tablet  aspirin 81 MG tablet  atorvastatin (LIPITOR) 40 MG tablet  lisinopril (ZESTRIL) 5 MG tablet  omeprazole (PRILOSEC) 20 MG   "capsule  timolol maleate (TIMOPTIC) 0.5 % ophthalmic solution            PHYSICAL EXAM     BP (!) 177/122   Pulse 63   Temp 97.6  F (36.4  C) (Temporal)   Resp 20   Ht 1.626 m (5' 4\")   Wt 57.6 kg (127 lb)   SpO2 98%   BMI 21.80 kg/m        PHYSICAL EXAM:     General: Patient appears well, nontoxic, comfortable  HEENT: Moist mucous membranes, no tongue swelling.  No head trauma.  No midline neck pain.  Cardiovascular: Normal rate, normal rhythm, no extremity edema.  No appreciable murmur.  Respiratory: No signs of respiratory distress, lungs are clear to auscultation bilaterally with no wheezes rhonchi or rales.  Abdominal: Soft, nontender, nondistended, no palpable masses, no guarding, no rebound  Musculoskeletal: Full range of motion of joints, no deformities appreciated.  Neurological: Alert and oriented, grossly neurologically intact.  Psychological: Normal affect and mood.  Integument: No rashes appreciated      RESULTS       Labs Ordered and Resulted from Time of ED Arrival to Time of ED Departure   BASIC METABOLIC PANEL - Abnormal       Result Value    Sodium 140      Potassium 4.3      Chloride 104      Carbon Dioxide (CO2) 27      Anion Gap 9      Urea Nitrogen 27.5 (*)     Creatinine 0.83      Calcium 8.8      Glucose 111 (*)     GFR Estimate 90     CBC WITH PLATELETS AND DIFFERENTIAL - Abnormal    WBC Count 7.1      RBC Count 4.00 (*)     Hemoglobin 12.1 (*)     Hematocrit 37.4 (*)     MCV 94      MCH 30.3      MCHC 32.4      RDW 12.3      Platelet Count 268      % Neutrophils 58      % Lymphocytes 31      % Monocytes 8      % Eosinophils 3      % Basophils 0      % Immature Granulocytes 0      NRBCs per 100 WBC 0      Absolute Neutrophils 4.2      Absolute Lymphocytes 2.2      Absolute Monocytes 0.6      Absolute Eosinophils 0.2      Absolute Basophils 0.0      Absolute Immature Granulocytes 0.0      Absolute NRBCs 0.0     TROPONIN T, HIGH SENSITIVITY - Normal    Troponin T, High Sensitivity 7   "   COVID-19 VIRUS (CORONAVIRUS) BY PCR - Normal    SARS CoV2 PCR Negative     NT PROBNP INPATIENT - Normal    N terminal Pro BNP Inpatient 79     TROPONIN T, HIGH SENSITIVITY       XR Chest Port 1 View   Final Result   IMPRESSION: Lungs are clear. Heart and pulmonary vascularity are normal. No signs of acute disease.      NM Lexiscan stress test (nuc card)    (Results Pending)       PROCEDURES:  Procedures:  Procedures       I, Sadia Kramer am serving as a scribe to document services personally performed by Benton Campa DO, based on my observations and the provider's statements to me.  I, Benton Campa DO, attest that Sadia Kramer is acting in a scribe capacity, has observed my performance of the services and has documented them in accordance with my direction.    Benton Campa DO  Emergency Medicine  [facility] EMERGENCY DEPARTMENT     Benton Campa DO  11/17/22 [id number]

## 2022-11-17 NOTE — ED NOTES
Expected Patient Referral to ED  4:08 PM    Referring Clinic/Provider:  [facility]    Reason for referral/Clinical facts:  Chest pain, hx of stents, chest pain with exertion    Recommendations provided:  Send to ED for further evaluation    Caller was informed that this institution does possess the capabilities and/or resources to provide for patient and should be transferred to our facility.    Discussed that if direct admit is sought and any hurdles are encountered, this ED would be happy to see the patient and evaluate.    Informed caller that recommendations provided are recommendations based only on the facts provided and that they responsible to accept or reject the advice, or to seek a formal in person consultation as needed and that this ED will see/treat patient should they arrive.      Benton Campa DO  [facility] EMERGENCY DEPARTMENT  [address] [place] [id number] [phone]       Benton Campa DO  11/17/22 2611

## 2022-11-17 NOTE — PROGRESS NOTES
"      Subjective   Donn is a 77 year old  presenting for the following health issues:  Chest Pain (Started about a week ago.  Has slight pain today.  No SOB or dizziness. Nothing makes it better or worse.  Comes and goes.  Shoveled snow today and felt fine.  But later the pain was still there.  Fell off his utility trailer, landed on right side about 2 weeks ago. He caught himself.  Did not hurt himself.  )    Achy pain that he forgets about when doing stuff.  Pain presently at 2-3/10.  Over the central chest.  Cough, deep breath and movement don't seem to affect it.  Walks the dogs about 2 mi a day and pain will go away and then he may notice it later.  Shoveled snow today and he felt ok doing that.   Has a h/o two drug eluding stents in 4 Nov 2014.  (mid LAD stented; also RCA disease)  The discomfort now does not feel similar to his sxs back then.  Does not carry nitros as they tend to give him a bad headache.       History of Present Illness       Reason for visit:  Chest pain    He eats 2-3 servings of fruits and vegetables daily.He consumes 1 sweetened beverage(s) daily.He exercises with enough effort to increase his heart rate 60 or more minutes per day.  He exercises with enough effort to increase his heart rate 5 days per week.   He is taking medications regularly.    Today's PHQ-9         PHQ-9 Total Score: 2    PHQ-9 Q9 Thoughts of better off dead/self-harm past 2 weeks :   Not at all    How difficult have these problems made it for you to do your work, take care of things at home, or get along with other people: Not difficult at all             Objective    BP (!) 146/68   Pulse 63   Temp 98.1  F (36.7  C) (Oral)   Resp 16   Ht 1.568 m (5' 1.75\")   Wt 57.6 kg (127 lb)   SpO2 98%   BMI 23.42 kg/m    Body mass index is 23.42 kg/m .  Physical Exam   GENERAL: healthy, alert and no distress  RESP: lungs clear to auscultation - no rales, rhonchi or wheezes  CV: regular rate and rhythm, normal S1 S2, no " S3 or S4, no murmur, click or rub, no peripheral edema and peripheral pulses strong  CHEST:  No tx to palpation.  MS: no gross musculoskeletal defects noted, no edema    EKG:  NSR nonspecific ST-T changes.        Encounter Diagnosis   Name Primary?     Chest pain, unspecified type, Smoldering discomfort, h/o FARAZ times two in 2014. Yes        PLAN:        To the ER for eval of atypical chest pain in a patient with a h/o two FARAZ (2014)  and current smoldering symptoms of chest pain.     Give additional 324 mg aspirin now.      Case reviewed with the ER at [facility] and pt sent via ambulance to address smoldering chest pain.  approx 40 min spent with pt and in completing history, exam, EKG, conversation regarding disposition with pt and with ER

## 2022-11-17 NOTE — PATIENT INSTRUCTIONS
To the ER for eval of atypical chest pain in a patient with a h/o two FARAZ (2014)  and current smoldering symptoms of chest pain.     Give additional 324 mg aspirin now.

## 2022-11-18 ENCOUNTER — TELEPHONE (OUTPATIENT)
Dept: FAMILY MEDICINE | Facility: CLINIC | Age: 77
End: 2022-11-18

## 2022-11-18 VITALS
WEIGHT: 127 LBS | HEART RATE: 62 BPM | OXYGEN SATURATION: 98 % | SYSTOLIC BLOOD PRESSURE: 121 MMHG | TEMPERATURE: 98.2 F | HEIGHT: 64 IN | BODY MASS INDEX: 21.68 KG/M2 | DIASTOLIC BLOOD PRESSURE: 67 MMHG | RESPIRATION RATE: 22 BRPM

## 2022-11-18 LAB
ANION GAP SERPL CALCULATED.3IONS-SCNC: 9 MMOL/L (ref 7–15)
BASOPHILS # BLD AUTO: 0 10E3/UL (ref 0–0.2)
BASOPHILS NFR BLD AUTO: 1 %
BUN SERPL-MCNC: 21.6 MG/DL (ref 8–23)
CALCIUM SERPL-MCNC: 9.2 MG/DL (ref 8.8–10.2)
CHLORIDE SERPL-SCNC: 103 MMOL/L (ref 98–107)
CREAT SERPL-MCNC: 0.76 MG/DL (ref 0.67–1.17)
D DIMER PPP FEU-MCNC: 1.26 UG/ML FEU (ref 0–0.5)
DEPRECATED HCO3 PLAS-SCNC: 29 MMOL/L (ref 22–29)
EOSINOPHIL # BLD AUTO: 0.2 10E3/UL (ref 0–0.7)
EOSINOPHIL NFR BLD AUTO: 3 %
ERYTHROCYTE [DISTWIDTH] IN BLOOD BY AUTOMATED COUNT: 12.6 % (ref 10–15)
GFR SERPL CREATININE-BSD FRML MDRD: >90 ML/MIN/1.73M2
GLUCOSE SERPL-MCNC: 93 MG/DL (ref 70–99)
HCT VFR BLD AUTO: 38.2 % (ref 40–53)
HGB BLD-MCNC: 12.4 G/DL (ref 13.3–17.7)
IMM GRANULOCYTES # BLD: 0 10E3/UL
IMM GRANULOCYTES NFR BLD: 0 %
LYMPHOCYTES # BLD AUTO: 2.1 10E3/UL (ref 0.8–5.3)
LYMPHOCYTES NFR BLD AUTO: 35 %
MCH RBC QN AUTO: 30.4 PG (ref 26.5–33)
MCHC RBC AUTO-ENTMCNC: 32.5 G/DL (ref 31.5–36.5)
MCV RBC AUTO: 94 FL (ref 78–100)
MONOCYTES # BLD AUTO: 0.5 10E3/UL (ref 0–1.3)
MONOCYTES NFR BLD AUTO: 8 %
NEUTROPHILS # BLD AUTO: 3.2 10E3/UL (ref 1.6–8.3)
NEUTROPHILS NFR BLD AUTO: 53 %
NRBC # BLD AUTO: 0 10E3/UL
NRBC BLD AUTO-RTO: 0 /100
PLATELET # BLD AUTO: 264 10E3/UL (ref 150–450)
POTASSIUM SERPL-SCNC: 4.2 MMOL/L (ref 3.4–5.3)
RBC # BLD AUTO: 4.08 10E6/UL (ref 4.4–5.9)
SODIUM SERPL-SCNC: 141 MMOL/L (ref 136–145)
WBC # BLD AUTO: 5.9 10E3/UL (ref 4–11)

## 2022-11-18 PROCEDURE — 36415 COLL VENOUS BLD VENIPUNCTURE: CPT | Performed by: INTERNAL MEDICINE

## 2022-11-18 PROCEDURE — 250N000013 HC RX MED GY IP 250 OP 250 PS 637: Performed by: INTERNAL MEDICINE

## 2022-11-18 PROCEDURE — 99217 PR OBSERVATION CARE DISCHARGE: CPT | Performed by: HOSPITALIST

## 2022-11-18 PROCEDURE — G0378 HOSPITAL OBSERVATION PER HR: HCPCS

## 2022-11-18 PROCEDURE — 85025 COMPLETE CBC W/AUTO DIFF WBC: CPT | Performed by: INTERNAL MEDICINE

## 2022-11-18 PROCEDURE — 85379 FIBRIN DEGRADATION QUANT: CPT | Performed by: INTERNAL MEDICINE

## 2022-11-18 PROCEDURE — 80048 BASIC METABOLIC PNL TOTAL CA: CPT | Performed by: INTERNAL MEDICINE

## 2022-11-18 RX ADMIN — PANTOPRAZOLE SODIUM 40 MG: 40 TABLET, DELAYED RELEASE ORAL at 07:48

## 2022-11-18 RX ADMIN — Medication 81 MG: at 07:48

## 2022-11-18 ASSESSMENT — ACTIVITIES OF DAILY LIVING (ADL)
ADLS_ACUITY_SCORE: 35

## 2022-11-18 NOTE — ED NOTES
"[facility] ED Handoff Report    ED Chief Complaint: Chest pain    ED Diagnosis:  (R07.9) Chest pain, unspecified type  Comment:   Plan: Continue to monitor.      PMH:    Past Medical History:   Diagnosis Date    Aphakia     Arthritis     CAD S/P percutaneous coronary angioplasty 11/04/2014    Combined form of age-related cataract, left eye 10/24/2017    Coronary artery disease     Environmental allergies     Glaucoma (increased eye pressure)     Hypertension goal BP (blood pressure) < 140/90 01/05/2017    Lightheadedness 03/11/2015    Lightheadedness     Retinal detachment 1990s    right eye    Strabismus         Code Status:  Full Code     Falls Risk: No Band: Not applicable    Current Living Situation/Residence: lives with a significant other     Elimination Status: Continent: Yes     Activity Level: Independent    Patients Preferred Language:  English     Needed: No    Vital Signs:  /59   Pulse 56   Temp 97.6  F (36.4  C) (Temporal)   Resp 16   Ht 1.626 m (5' 4\")   Wt 57.6 kg (127 lb)   SpO2 98%   BMI 21.80 kg/m       Cardiac Rhythm: NSR  Pain Score: 0/10    Is the Patient Confused:  No    Last Food or Drink: 11/17/22 at 2030 hours.    Focused Assessment:  Cardiac; NSR    Tests Performed: Done: Labs and Imaging    Treatments Provided:      Family Dynamics/Concerns: No    Family Updated On Visitor Policy: Yes    Plan of Care Communicated to Family: Yes    Who Was Updated about Plan of Care: Patient    Belongings Checklist Done and Signed by Patient: No    Medications sent with patient:     Covid: asymptomatic , negative    Additional Information: Intermittent CP, has only lasted aprx 5 minutes    RN: Mary Lou Castro RN   11/17/2022 9:30 PM      "

## 2022-11-18 NOTE — DISCHARGE SUMMARY
[facility] MEDICINE  DISCHARGE SUMMARY     Primary Care Physician: Ha Anderson  Admission Date: 11/17/2022   Discharge Provider: Christopher Mitchell MD Discharge Date: 11/18/2022   Diet: None      Code Status: Prior   Activity: DCACTIVITY: Activity as tolerated        Condition at Discharge: Left AMA     REASON FOR PRESENTATION(See Admission Note for Details)   Chest pain    PRINCIPAL & ACTIVE DISCHARGE DIAGNOSES     Active Problems:    * No active hospital problems. *    Chest pain    PENDING LABS     Unresulted Labs Ordered in the Past 30 Days of this Admission     No orders found for last 31 day(s).            PROCEDURES ( this hospitalization only)          RECOMMENDATIONS TO OUTPATIENT PROVIDER FOR F/U VISIT             DISPOSITION     Left AMA    SUMMARY OF HOSPITAL COURSE:      The patient is a 77-year-old  male with a past medical history CAD essential hypertension hyperlipidemia who presented with chest pain is admitted to rule out ACS his initial troponin was negative and EKG was nonischemic.  Patient was ordered a stress test however due to the national shortage of the nuclear tracer and was changed to a treadmill study.  However the patient proceeded to leave AGAINST MEDICAL ADVICE prior to completion of his work-up or having a stress test done.    Discharge Medications with Med changes:     Discharge Medication List as of 11/18/2022  9:16 AM      CONTINUE these medications which have NOT CHANGED    Details   amLODIPine (NORVASC) 5 MG tablet Take 1 tablet (5 mg) by mouth daily, Disp-90 tablet, R-3, E-Prescribe      aspirin 81 MG tablet Take 1 tablet by mouth daily., Historical      atorvastatin (LIPITOR) 40 MG tablet Take 1 tablet (40 mg) by mouth daily, Disp-90 tablet, R-3, E-Prescribe      lisinopril (ZESTRIL) 5 MG tablet Take 1 tablet (5 mg) by mouth daily, Disp-90 tablet, R-3, E-Prescribe      omeprazole (PRILOSEC) 20 MG DR capsule 1 po daily, Disp-90  capsule, R-3, E-Prescribe      timolol maleate (TIMOPTIC) 0.5 % ophthalmic solution Place 1 drop into the right eye daily, Disp-5 mL, R-11, E-Prescribe                   Rationale for medication changes:      Left AMA prior to completion of work-up        Consults   Cardiology     CARDIOLOGY IP CONSULT    Immunizations given this encounter     Most Recent Immunizations   Administered Date(s) Administered     COVID-19,PF,Pfizer (12+ Yrs) 10/14/2021     COVID-19,PF,Pfizer 12+ Yrs (2022 and After) 04/13/2022     Influenza (High Dose) 3 valent vaccine 10/15/2019     Influenza (IIV3) PF 12/07/2007     Influenza, Quad, High Dose, Pf, 65yr+ (Fluzone HD) 03/07/2022     Pneumo Conj 13-V (2010&after) 04/27/2016     Pneumococcal 23 valent 04/17/2013           Anticoagulation Information      Recent INR results: No results for input(s): INR in the last 168 hours.  Warfarin doses (if applicable) or name of other anticoagulant:       SIGNIFICANT IMAGING FINDINGS     Results for orders placed or performed during the hospital encounter of 11/17/22   XR Chest Port 1 View    Impression    IMPRESSION: Lungs are clear. Heart and pulmonary vascularity are normal. No signs of acute disease.       SIGNIFICANT LABORATORY FINDINGS     Most Recent 3 Troponin's:Recent Labs   Lab Test 11/04/14  2109   TROPONIN 0.01       Change    Discharge Orders     No discharge procedures on file.    Examination   Physical Exam   Temp:  [97.6  F (36.4  C)-98.2  F (36.8  C)] 98.2  F (36.8  C)  Pulse:  [56-73] 62  Resp:  [15-24] 22  BP: (116-177)/() 121/67  SpO2:  [98 %-99 %] 98 %  Wt Readings from Last 1 Encounters:   11/17/22 57.6 kg (127 lb)       Left AMA    Please see EMR for more detailed significant labs, imaging, consultant notes etc.    Christopher WEISS MD, personally saw the patient today and spent less than or equal to 30 minutes discharging this patient.    Christopher Mitchell MD  [facility]    CC:Ha Anderson

## 2022-11-18 NOTE — TELEPHONE ENCOUNTER
Reason for Call:  Appointment Request    Patient requesting this type of appt:  Hospital/ED Follow-Up     Requested provider: Ha Anderson    Reason patient unable to be scheduled: Not with their preferred provider    When does patient want to be seen/preferred time: 1-2 days    Comments: Pt was at [facility] on 11.17.22, due to pain in right chest. Was dischged and hospital advised that he should have a stress test but they did not have that available     Could we send this information to you in [facility] or would you prefer to receive a phone call?:   Patient would prefer a phone call   Okay to leave a detailed message?: Yes at Cell number on file:    Telephone Information:   Mobile [phone]       Call taken on 11/18/2022 at 9:39 AM by Trinidad Walls

## 2022-11-18 NOTE — PLAN OF CARE
Pt still refusing Tele. He says he will stay overnight for the stress test tomorrow, but he will absolutely not wear the tele monitor. Upset and swearing when discussed.

## 2022-11-18 NOTE — H&P
[facility]    History and Physical - Hospitalist Service       Date of Admission:  11/17/2022    Assessment & Plan      Donn Allen is admitted on 11/17/2022. He is a 76-year-old man with PMH of CAD, hyperlipidemia and hypertension who presented with chest pain.  Initial evaluation for chest pain rule out grossly negative however with high ADE score and extensive CAD history, patient is admitted for chest pain to rule out ACS     #Chest pain to rule out ACS  Complete serial cardiac enzymes + cardiac monitoring  Resume home dose of aspirin 81 mg daily  Nitroglycerin as needed for intermittent ischemic chest pain  Resume PTA CAD medications- ACE inhibitor and statin  N.p.o. after midnight for stress test in the morning  Possible cardiology evaluation as per primary team with results of stress test    #Hypertension + hyperlipidemia  Resume other home medication as per comorbidities.         Diet: NPO for Medical/Clinical Reasons Except for: Meds, Ice Chips  DVT Prophylaxis: SCD   Cullen Catheter: Not present  Central Lines: None  Cardiac Monitoring: ACTIVE order. Indication: Chest pain/ ACS rule out (24 hours)  Code Status: Full Code    Clinically Significant Risk Factors Present on Admission                  # Hypertension: home medication list includes antihypertensive(s)             Disposition Plan      Expected Discharge Date: 11/18/2022                The patient's care was discussed with the Bedside Nurse and Patient.    Vanessa Julio MD  Hospitalist Service  [facility]  Securely message with the Vocera Web Console (learn more here)  Text page via [employer] Paging/Directory         ______________________________________________________________________    Chief Complaint   Chest pain     History is obtained from the patient    History of Present Illness   Donn Allen is a 77 year old man with PMH of CAD, hyperlipidemia and hypertension who presented with chest  pain.  Chest pain started 5 days ago, sudden in onset intermittent in nature initially on the right side of her chest and radiates to the central region with no radiation to the shoulder, jaw or left arm.  Chest pain is also said to be exacerbated by extension and improve with rest.  Each episode is said to last from few seconds to less than 5 minutes.  Patient denies associated diaphoresis or shortness of breath with chest pain.  He however endorses increasing frequency therefore the reason for presentation.    Of note, patient had mid LAD and RCA stent placed in 2014, stress test done in 2017 was grossly within normal limits.  However he endorses 50% stenosis in one of the artery from prior angiogram that is being managed medically.  He denies cough, fever, palpitation, shortness of breath, leg swelling, orthopnea or PND.  He is compliant to his medication, takes aspirin 81 mg daily and no recent change in medication.    Review of other system grossly at baseline.      Review of Systems    The 10 point Review of Systems is negative other than noted in the HPI or here.    Past Medical History    I have reviewed this patient's medical history and updated it with pertinent information if needed.   Past Medical History:   Diagnosis Date     Aphakia      Arthritis      CAD S/P percutaneous coronary angioplasty 11/04/2014     Combined form of age-related cataract, left eye 10/24/2017     Coronary artery disease      Environmental allergies      Glaucoma (increased eye pressure)      Hypertension goal BP (blood pressure) < 140/90 01/05/2017     Lightheadedness 03/11/2015     Lightheadedness      Retinal detachment 1990s    right eye     Strabismus        Past Surgical History   I have reviewed this patient's surgical history and updated it with pertinent information if needed.  Past Surgical History:   Procedure Laterality Date     CARDIAC CATHERIZATION  Nov 2014    2 stents      CATARACT IOL, RT/LT       COLONOSCOPY   3/18/2014    Procedure: COLONOSCOPY;  COLONOSCOPY SCREEN/ EGD UPPER GI ENDOSCOPY/ LOSS OF WEIGHT/ DEAL;  Surgeon: Nick Underwood MD;  Location: [facility] OR     DESTRUC RETINAL LESN,CRYOTHERAPY Right      ESOPHAGOSCOPY, GASTROSCOPY, DUODENOSCOPY (EGD), COMBINED  3/18/2014    Procedure: COMBINED ESOPHAGOSCOPY, GASTROSCOPY, DUODENOSCOPY (EGD), BIOPSY SINGLE OR MULTIPLE;;  Surgeon: Nick Underwood MD;  Location: [facility] OR     KNEE SURGERY      left meniscus 2019     PHACOEMULSIFICATION WITH STANDARD INTRAOCULAR LENS IMPLANT  2017    left eye     RETINAL REATTACHMENT       ROTATOR CUFF REPAIR RT/LT Bilateral      VASECTOMY       [email or web address] FOOT/TOES SURGERY PROC UNLISTED Right     trauma     Z STOMACH SURGERY PROCEDURE UNLISTED      Partial gastrectomy-h/o ulcers       Social History   I have reviewed this patient's social history and updated it with pertinent information if needed.  Social History     Tobacco Use     Smoking status: Former     Types: Cigarettes     Quit date: 1972     Years since quittin.8     Smokeless tobacco: Never     Tobacco comments:     non-smoking household   Substance Use Topics     Alcohol use: Yes     Comment: occ beer / wine     Drug use: No       Family History   I have reviewed this patient's family history and updated it with pertinent information if needed.  Family History   Problem Relation Age of Onset     Cancer Father 73        lymphoma     Cataracts Father      Cancer Brother 73        pancreatic cancer 1/2 brother     Cancer Brother 83        stomach? 1/2 brother     Cancer Brother         lung or kidney?     Diabetes Maternal Aunt      Cerebrovascular Disease No family hx of      C.A.D. No family hx of      Alzheimer Disease No family hx of      Neurologic Disorder No family hx of      Glaucoma No family hx of      Macular Degeneration No family hx of        Prior to Admission Medications   Prior to Admission Medications   Prescriptions Last  Dose Informant Patient Reported? Taking?   amLODIPine (NORVASC) 5 MG tablet 2022  No Yes   Sig: Take 1 tablet (5 mg) by mouth daily   aspirin 81 MG tablet 2022 at am  Yes Yes   Sig: Take 1 tablet by mouth daily.   atorvastatin (LIPITOR) 40 MG tablet 2022 at pm  No Yes   Sig: Take 1 tablet (40 mg) by mouth daily   lisinopril (ZESTRIL) 5 MG tablet 2022 at pm  No Yes   Sig: Take 1 tablet (5 mg) by mouth daily   omeprazole (PRILOSEC) 20 MG DR capsule 2022 at am  No Yes   Si po daily   timolol maleate (TIMOPTIC) 0.5 % ophthalmic solution 2022 at am  No Yes   Sig: Place 1 drop into the right eye daily      Facility-Administered Medications Last Administration Doses Remaining   aspirin (ASA) chewable tablet 324 mg 2022  4:40 PM 0        Allergies   Allergies   Allergen Reactions     Ibuprofen      Other reaction(s): Unknown/Not Verified     Ibuprofen Sodium      ibu     Seasonal Allergies      Tetanus Immune Globulin Unknown     Tetanus Toxoid        Physical Exam   Vital Signs: Temp: 98  F (36.7  C) Temp src: Oral BP: 133/59 Pulse: 59   Resp: 16 SpO2: 99 % O2 Device: None (Room air)    Weight: 127 lbs 0 oz    Constitutional: awake, alert, cooperative, no apparent distress, and appears stated age  Respiratory: No increased work of breathing, good air exchange, clear to auscultation bilaterally, no crackles or wheezing  Cardiovascular: S1S2 heard   GI: No scars, normal bowel sounds, soft, non-distended, non-tender, no masses palpated, no hepatosplenomegally  Skin: no bruising or bleeding and normal skin color, texture, turgor  Musculoskeletal: There is no redness, warmth, or swelling of the joints.  Full range of motion noted.  Motor strength is 5 out of 5 all extremities bilaterally.  Tone is normal.  Neurologic: Awake, alert, oriented to name, place and time.  Cranial nerves II-XII are grossly intact.  Motor is 5 out of 5 bilaterally.  Cerebellar finger to nose, heel to shin  intact.  Sensory is intact.  Babinski down going, Romberg negative, and gait is normal.    Data   Data reviewed today: I reviewed all medications, new labs and imaging results over the last 24 hours. I personally reviewed the EKG tracing showing NSR .    Recent Labs   Lab 11/17/22  1803   WBC 7.1   HGB 12.1*   MCV 94         POTASSIUM 4.3   CHLORIDE 104   CO2 27   BUN 27.5*   CR 0.83   ANIONGAP 9   MICAELA 8.8   *     7.1    \    12.1 (L)    /    268   N 58    L N/A    140    104    27.5 (H) /   ------------------------------------ 111 (H)   ALT N/A   AST N/A   AP N/A   ALB N/A   Ca 8.8  4.3    27    0.83 \    % RETIC N/A    LDH N/A  Troponin N/A    BNP N/A    CK N/A  INR N/A   PTT N/A    D-dimer N/A    Fibrinogen N/A    Antithrombin N/A  Ferritin N/A  CRP N/A    IL-6 N/A  Recent Results (from the past 24 hour(s))   XR Chest Port 1 View    Narrative    EXAM: XR CHEST PORT 1 VIEW  LOCATION: [facility]  DATE/TIME: 11/17/2022 6:14 PM    INDICATION: chest pain  COMPARISON: 01/14/2019      Impression    IMPRESSION: Lungs are clear. Heart and pulmonary vascularity are normal. No signs of acute disease.

## 2022-11-18 NOTE — PLAN OF CARE
"PRIMARY DIAGNOSIS: \"GENERIC\" NURSING  OUTPATIENT/OBSERVATION GOALS TO BE MET BEFORE DISCHARGE:  1. ADLs back to baseline: Yes    2. Activity and level of assistance: Ambulating independently.    3. Pain status: Pain free.    4. Return to near baseline physical activity: Yes     Discharge Planner Nurse   Safe discharge environment identified: Yes  Barriers to discharge: Yes       Entered by: Parth Castillo RN 11/18/2022 7:25 AM     Please review provider order for any additional goals.   Nurse to notify provider when observation goals have been met and patient is ready for discharge.    Pt reported no chest pain overnight.   A/O 4x, ambulates independently.  Tele, NSR.  Refused telemetry and insisted on not being bothered overnight, or he was planning on leaving AMA. Hosptalist notified.   "

## 2022-11-18 NOTE — PHARMACY-ADMISSION MEDICATION HISTORY
Pharmacy Note - Admission Medication History     ______________________________________________________________________    Prior To Admission (PTA) med list completed and updated in EMR.       Current Facility-Administered Medications for the 11/17/22 encounter (Hospital Encounter)   Medication     [COMPLETED] aspirin (ASA) chewable tablet 324 mg     PTA Med List   Medication Sig Last Dose     amLODIPine (NORVASC) 5 MG tablet Take 1 tablet (5 mg) by mouth daily 11/16/2022     aspirin 81 MG tablet Take 1 tablet by mouth daily. 11/17/2022 at am     atorvastatin (LIPITOR) 40 MG tablet Take 1 tablet (40 mg) by mouth daily 11/16/2022 at pm     lisinopril (ZESTRIL) 5 MG tablet Take 1 tablet (5 mg) by mouth daily 11/16/2022 at pm     omeprazole (PRILOSEC) 20 MG DR capsule 1 po daily 11/17/2022 at am     timolol maleate (TIMOPTIC) 0.5 % ophthalmic solution Place 1 drop into the right eye daily 11/17/2022 at am       Information source(s): Patient and CareEverywhere/SureScripts  Method of interview communication: in-person    Summary of Changes to PTA Med List  New: n/a  Discontinued: n/a  Changed: n/a    Patient was asked about OTC/herbal products specifically.  PTA med list reflects this.    In the past week, patient estimated taking medication this percent of the time:  greater than 90%.    Allergies were reviewed, assessed, and updated with the patient.      Patient did not bring any medications to the hospital and can't retrieve from home. No multi-dose medications are available for use during hospital stay.     The information provided in this note is only as accurate as the sources available at the time of the update(s).    Thank you for the opportunity to participate in the care of this patient.    Lamar Gonzalez [facility]  11/17/2022 7:51 PM

## 2022-11-18 NOTE — PLAN OF CARE
"PRIMARY DIAGNOSIS: \"GENERIC\" NURSING  OUTPATIENT/OBSERVATION GOALS TO BE MET BEFORE DISCHARGE:  1. ADLs back to baseline: Yes    2. Activity and level of assistance: Ambulating independently.    3. Pain status: Pain free.    4. Return to near baseline physical activity: Yes     Discharge Planner Nurse   Safe discharge environment identified: Yes  Barriers to discharge: Yes       Entered by: Parth Castillo RN 11/18/2022 7:23 AM     Please review provider order for any additional goals.   Nurse to notify provider when observation goals have been met and patient is ready for discharge.    Pt reported no pain tonight.    "

## 2022-11-18 NOTE — PLAN OF CARE
"leaving AMA, refused to sign AMA paperwork. frustrated that he could not have Nucular stress test this am. plans to follow up with his primary in [place] to arrange stress test. will come to ER if chest pain recurrs.   States he \"has things to do and dogs that will be tearing up the place\"  Lady Shau RN    Problem: Chest Pain  Goal: Resolution of Chest Pain Symptoms  Outcome: Progressing     "

## 2022-11-18 NOTE — ED NOTES
"Patient stating to multiple staff members, if he has to be hooked up to heart monitor and BP, he will \"rip everything off and go home\".  Patient told a different staff member the same thing. Patient stated he won't be able to sleep and that had he known he would need to have telemetry, he would have gone home. Dr. Campa stated he would speak to the patient.   "

## 2022-11-20 NOTE — TELEPHONE ENCOUNTER
Returned patient's call regarding his request to have [facility] Patient was instructed to call the medical records office ([phone]) to have [facility]. Patient understands and plans to call the office.       Dacia Wilson, F-AAA   Clinical Audiologist, [place] #[id number]   5/1/2017     no diarrhea/no dysuria

## 2022-11-21 ENCOUNTER — OFFICE VISIT (OUTPATIENT)
Dept: FAMILY MEDICINE | Facility: CLINIC | Age: 77
End: 2022-11-21
Payer: MEDICARE

## 2022-11-21 VITALS
HEART RATE: 72 BPM | OXYGEN SATURATION: 100 % | WEIGHT: 129 LBS | DIASTOLIC BLOOD PRESSURE: 68 MMHG | TEMPERATURE: 98.3 F | SYSTOLIC BLOOD PRESSURE: 138 MMHG | BODY MASS INDEX: 22.14 KG/M2

## 2022-11-21 DIAGNOSIS — Z23 ENCOUNTER FOR IMMUNIZATION: ICD-10-CM

## 2022-11-21 DIAGNOSIS — R07.9 CHEST PAIN, UNSPECIFIED TYPE: Primary | ICD-10-CM

## 2022-11-21 DIAGNOSIS — Z98.61 CAD S/P PERCUTANEOUS CORONARY ANGIOPLASTY: ICD-10-CM

## 2022-11-21 DIAGNOSIS — I10 HYPERTENSION GOAL BP (BLOOD PRESSURE) < 140/90: ICD-10-CM

## 2022-11-21 DIAGNOSIS — K21.9 GASTROESOPHAGEAL REFLUX DISEASE, UNSPECIFIED WHETHER ESOPHAGITIS PRESENT: ICD-10-CM

## 2022-11-21 DIAGNOSIS — I25.10 CAD S/P PERCUTANEOUS CORONARY ANGIOPLASTY: ICD-10-CM

## 2022-11-21 PROCEDURE — 0124A COVID-19,PF,PFIZER BOOSTER BIVALENT: CPT | Performed by: FAMILY MEDICINE

## 2022-11-21 PROCEDURE — G0008 ADMIN INFLUENZA VIRUS VAC: HCPCS | Performed by: FAMILY MEDICINE

## 2022-11-21 PROCEDURE — 99214 OFFICE O/P EST MOD 30 MIN: CPT | Mod: 25 | Performed by: FAMILY MEDICINE

## 2022-11-21 PROCEDURE — 90662 IIV NO PRSV INCREASED AG IM: CPT | Performed by: FAMILY MEDICINE

## 2022-11-21 PROCEDURE — 91312 COVID-19,PF,PFIZER BOOSTER BIVALENT: CPT | Performed by: FAMILY MEDICINE

## 2022-11-21 RX ORDER — NITROGLYCERIN 0.4 MG/1
TABLET SUBLINGUAL
Qty: 20 TABLET | Refills: 1 | Status: SHIPPED | OUTPATIENT
Start: 2022-11-21 | End: 2023-01-17

## 2022-11-21 ASSESSMENT — PAIN SCALES - GENERAL: PAINLEVEL: NO PAIN (0)

## 2022-11-21 NOTE — PATIENT INSTRUCTIONS
Call [phone] to schedule stress echo test       Hold timolol day before and morning of stress test    Nitroglycerin to carry with you    Stay on daily omeprazole    Could use tums or famotidine as needed for possible heartburn   Problem: Skin Injury Risk (Adult)  Goal: Skin Health and Integrity  Outcome: Outcome(s) achieved Date Met: 03/25/18      Problem: Patient Care Overview  Goal: Plan of Care Review  Outcome: Outcome(s) achieved Date Met: 03/25/18 03/25/18 1423   Coping/Psychosocial   Plan of Care Reviewed With patient   Plan of Care Review   Progress improving   OTHER   Outcome Summary No c/o pain or soa. Pt discharging back to [facility].     Goal: Individualization and Mutuality  Outcome: Outcome(s) achieved Date Met: 03/25/18    Goal: Discharge Needs Assessment  Outcome: Outcome(s) achieved Date Met: 03/25/18    Goal: Interprofessional Rounds/Family Conf  Outcome: Outcome(s) achieved Date Met: 03/25/18      Problem: Fall Risk (Adult)  Goal: Absence of Fall  Outcome: Outcome(s) achieved Date Met: 03/25/18

## 2022-11-21 NOTE — PROGRESS NOTES
Subjective   Donn is a 77 year old , presenting for the following health issues:  Emergency Department      HPI     ED/UC Followup:    Facility:  [facility]  Date of visit: 11/17/2022 was sent directly by ambulance from the clinic  Reason for visit: chest pain  Current Status: stable still having the pain but he did leave the hospital AMA         Review of Systems      Reviewed the emergency room note in detail    Pain was not worse with shoveling snow    Fell off trailer, caught self   Patient thought he strained chest     Walk dogs twice daily    Chest pain not changing with walking    2 stents in heart            Objective    BP (!) 178/93 (BP Location: Right arm, Patient Position: Chair, Cuff Size: Adult Regular)   Pulse 72   Temp 98.3  F (36.8  C) (Temporal)   Wt 58.5 kg (129 lb)   SpO2 100%   BMI 22.14 kg/m    Body mass index is 22.14 kg/m .  Physical Exam  Constitutional:       Appearance: He is well-developed and well-nourished.   HENT:      Head: Normocephalic and atraumatic.   Eyes:      Conjunctiva/sclera: Conjunctivae normal.   Neck:      Vascular: No carotid bruit.   Cardiovascular:      Rate and Rhythm: Normal rate and regular rhythm.      Pulses: Intact distal pulses.      Heart sounds: Normal heart sounds.   Pulmonary:      Effort: Pulmonary effort is normal. No respiratory distress.      Breath sounds: Normal breath sounds.   Musculoskeletal:         General: No edema.   Neurological:      Mental Status: He is alert and oriented to person, place, and time.      Cranial Nerves: No cranial nerve deficit.   Psychiatric:         Mood and Affect: Mood and affect normal.         Speech: Speech normal.         Behavior: Behavior normal.         abd soft  No edema   Radials symmetric         ASSESSMENT / PLAN:  (R07.9) Chest pain, unspecified type  (primary encounter diagnosis)  Comment: patient needs stress test.  Nuclear option at hospital not doable but he can exercise fine.    Plan:  Echocardiogram Exercise Stress         Patient to schedule this.     (I25.10,  Z98.61) CAD S/P percutaneous coronary angioplasty  Comment: prescription given.  Carry this with.   Plan: nitroGLYcerin (NITROSTAT) 0.4 MG sublingual         tablet             (Z23) Encounter for immunization  Comment: shots updated   Plan: INFLUENZA, QUAD, HD, PF, 65+ (FLUZONE HD),         COVID-19, PF Pfizer Booster Bivalent 12+,         SCREENING QUESTIONS FOR ADULT IMMUNIZATIONS             (I10) Hypertension goal BP (blood pressure) < 140/90  Comment: blood pressure at goal on recheck   Plan: as above     (K21.9) Gastroesophageal reflux disease, unspecified whether esophagitis present  Comment: stay on daily ppi  Plan: as above.  Can use h2 or tums prn.       I reviewed the patient's medications, allergies, medical history, family history, and social history.    Ha Anderson MD

## 2022-11-29 ENCOUNTER — HOSPITAL ENCOUNTER (OUTPATIENT)
Dept: CARDIOLOGY | Facility: CLINIC | Age: 77
Discharge: HOME | End: 2022-11-29
Attending: FAMILY MEDICINE | Admitting: FAMILY MEDICINE
Payer: MEDICARE

## 2022-11-29 DIAGNOSIS — R07.9 CHEST PAIN, UNSPECIFIED TYPE: ICD-10-CM

## 2022-11-29 PROCEDURE — 93325 DOPPLER ECHO COLOR FLOW MAPG: CPT | Mod: 26 | Performed by: INTERNAL MEDICINE

## 2022-11-29 PROCEDURE — 93016 CV STRESS TEST SUPVJ ONLY: CPT | Performed by: INTERNAL MEDICINE

## 2022-11-29 PROCEDURE — 93321 DOPPLER ECHO F-UP/LMTD STD: CPT | Mod: 26 | Performed by: INTERNAL MEDICINE

## 2022-11-29 PROCEDURE — C8928 TTE W OR W/O FOL W/CON,STRES: HCPCS

## 2022-11-29 PROCEDURE — 93017 CV STRESS TEST TRACING ONLY: CPT

## 2022-11-29 PROCEDURE — 93018 CV STRESS TEST I&R ONLY: CPT | Performed by: INTERNAL MEDICINE

## 2022-11-29 PROCEDURE — 255N000002 HC RX 255 OP 636: Performed by: INTERNAL MEDICINE

## 2022-11-29 PROCEDURE — 93350 STRESS TTE ONLY: CPT | Mod: 26 | Performed by: INTERNAL MEDICINE

## 2022-11-29 RX ADMIN — PERFLUTREN 5 ML: 6.52 INJECTION, SUSPENSION INTRAVENOUS at 10:12

## 2022-11-29 NOTE — RESULT ENCOUNTER NOTE
Good news.  Heart stress test was normal.      Okay to increase exercise as able.    Follow up as needed based on symptoms.    Ha Anderson MD

## 2023-01-05 ENCOUNTER — OFFICE VISIT (OUTPATIENT)
Dept: FAMILY MEDICINE | Facility: CLINIC | Age: 78
End: 2023-01-05
Payer: MEDICARE

## 2023-01-05 VITALS
RESPIRATION RATE: 18 BRPM | HEIGHT: 64 IN | DIASTOLIC BLOOD PRESSURE: 60 MMHG | HEART RATE: 56 BPM | BODY MASS INDEX: 22.02 KG/M2 | SYSTOLIC BLOOD PRESSURE: 118 MMHG | TEMPERATURE: 97.7 F | WEIGHT: 129 LBS | OXYGEN SATURATION: 97 %

## 2023-01-05 DIAGNOSIS — H40.001 GLAUCOMA SUSPECT OF RIGHT EYE: ICD-10-CM

## 2023-01-05 DIAGNOSIS — Z86.69 H/O RETINAL DETACHMENT: ICD-10-CM

## 2023-01-05 DIAGNOSIS — R52 PAIN: Primary | ICD-10-CM

## 2023-01-05 LAB — ERYTHROCYTE [SEDIMENTATION RATE] IN BLOOD BY WESTERGREN METHOD: 16 MM/HR (ref 0–20)

## 2023-01-05 PROCEDURE — 85652 RBC SED RATE AUTOMATED: CPT | Performed by: FAMILY MEDICINE

## 2023-01-05 PROCEDURE — 99213 OFFICE O/P EST LOW 20 MIN: CPT | Performed by: FAMILY MEDICINE

## 2023-01-05 PROCEDURE — 36415 COLL VENOUS BLD VENIPUNCTURE: CPT | Performed by: FAMILY MEDICINE

## 2023-01-05 ASSESSMENT — ENCOUNTER SYMPTOMS: HEADACHES: 1

## 2023-01-05 NOTE — PROGRESS NOTES
Assessment & Plan     Pain Right temple  Labs pending   ? Neuralgic pain  Consider gabapentin as This has Gone on for sometine  Follow up 1 month PMD-sooner if worse  - ESR: Erythrocyte sedimentation rate; Future  - ESR: Erythrocyte sedimentation rate    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  Sees Opthmology    H/O retinal detachment      Return in about 1 month (around 2/5/2023), or if symptoms worsen or fail to improve, for recheck/ sooner if worse or New symptoms.    Nai Ernst MD  [facility] BAYRON Pop is a 77 year old{ presenting for the following health issues:  Headache (Right eye/)      Headache     History of Present Illness       Reason for visit:  Head ache    He eats 2-3 servings of fruits and vegetables daily.He consumes 1 sweetened beverage(s) daily.He exercises with enough effort to increase his heart rate 30 to 60 minutes per day.  He exercises with enough effort to increase his heart rate 7 days per week.   He is taking medications regularly.       Pain History:  When did you first notice your pain? - Acute Pain   Have you seen anyone else for your pain? Yes - says he has mentioned it to his opthmlogist and PMD  Where in your body do you have pain? Headache  Onset/Duration: started in summer-[facility] [place]-says he has discussed with Dr Anderson  Description  Location: Right temple   Character: sharp pain, intermittent-comes on dull and then gets sharp and starts easing -lasts 10 seconds and Then better  It seems to getting worse -comes every 30 seconds   Frequency:  As above  Duration:  As above  Wake with headaches: No  Able to do daily activities when headache present: no   Intensity:  moderate  Progression of Symptoms:  worsening  Accompanying signs and symptoms:  Stiff neck: No  Neck or upper back pain: No  Sinus or URI symptoms -always has Runny nose  Fever: No  Nausea or vomiting: No  Dizziness: No  Numbness/tingling: No  Weakness:  "No  Visual changes: had eye surgery-had trauma many years ago  Pressure in eyes have been High  History  Head trauma: none recently  Family history of migraines: No  Daily pain medication use: No  Previous tests for headaches: No  Neurologist evaluation: No  Precipitating or Alleviating factors (light/sound/sleep/caffeine):   Therapies tried and outcome: mario             Review of Systems   Neurological: Positive for headaches.      CONSTITUTIONAL: NEGATIVE for fever, chills, change in weight  EYES: see Eye exam  ENT/MOUTH: NEGATIVE for ear, mouth and throat problems  RESP: NEGATIVE for significant cough or SOB  CV: NEGATIVE for chest pain, palpitations or peripheral edema  NEURO: NEGATIVE for weakness, dizziness or paresthesias    Rest of the ROS is Negative except see above and Problem list [stable]    Objective    /60 (BP Location: Left arm, Patient Position: Chair, Cuff Size: Adult Regular)   Pulse 56   Temp 97.7  F (36.5  C) (Tympanic)   Resp 18   Ht 1.626 m (5' 4\")   Wt 58.5 kg (129 lb)   SpO2 97%   BMI 22.14 kg/m    Body mass index is 22.14 kg/m .  Physical Exam   GENERAL: healthy, alert and no distress  EYES: Eyes grossly normal to inspection, PERRL and conjunctivae and sclerae normal  HENT: normal cephalic/atraumatic, nose and mouth without ulcers or lesions, oropharynx clear and oral mucous membranes moist  NECK: no adenopathy, no asymmetry, masses, or scars and thyroid normal to palpation  RESP: lungs clear to auscultation - no rales, rhonchi or wheezes  CV: regular rate and rhythm, normal S1 S2, no S3 or S4, no murmur, click or rub, no peripheral edema and peripheral pulses strong  ABDOMEN: soft, nontender, no hepatosplenomegaly, no masses and bowel sounds normal  MS: no gross musculoskeletal defects noted, no edema  Mild tenderness Right temple            "

## 2023-01-17 ENCOUNTER — NURSE TRIAGE (OUTPATIENT)
Dept: FAMILY MEDICINE | Facility: CLINIC | Age: 78
End: 2023-01-17

## 2023-01-17 ENCOUNTER — VIRTUAL VISIT (OUTPATIENT)
Dept: FAMILY MEDICINE | Facility: CLINIC | Age: 78
End: 2023-01-17
Payer: MEDICARE

## 2023-01-17 DIAGNOSIS — U07.1 INFECTION DUE TO 2019 NOVEL CORONAVIRUS: Primary | ICD-10-CM

## 2023-01-17 DIAGNOSIS — E78.5 HYPERLIPIDEMIA LDL GOAL <70: ICD-10-CM

## 2023-01-17 DIAGNOSIS — I10 HYPERTENSION GOAL BP (BLOOD PRESSURE) < 140/90: ICD-10-CM

## 2023-01-17 DIAGNOSIS — Z98.61 CAD S/P PERCUTANEOUS CORONARY ANGIOPLASTY: ICD-10-CM

## 2023-01-17 DIAGNOSIS — I25.10 CAD S/P PERCUTANEOUS CORONARY ANGIOPLASTY: ICD-10-CM

## 2023-01-17 PROCEDURE — 99442 PR PHYSICIAN TELEPHONE EVALUATION 11-20 MIN: CPT | Mod: 95 | Performed by: FAMILY MEDICINE

## 2023-01-17 RX ORDER — NITROGLYCERIN 0.4 MG/1
TABLET SUBLINGUAL
Qty: 25 TABLET | Refills: 1 | Status: SHIPPED | OUTPATIENT
Start: 2023-01-17

## 2023-01-17 RX ORDER — VITAMIN B COMPLEX
1 TABLET ORAL DAILY
COMMUNITY
Start: 2023-01-17

## 2023-01-17 NOTE — TELEPHONE ENCOUNTER
RN COVID TREATMENT VISIT  01/17/23    Donn Allen  77 year old  Current weight? 127 pounds     Has the patient been seen by a primary care provider at an [facility] or [facility] within the past two years? Yes.   Have you been in close proximity to/do you have a known exposure to a person with a confirmed case of influenza? No.     Date of positive COVID test (PCR or at home)?  1/16/2023    Current COVID symptoms: fever or chills, cough, fatigue, muscle or body aches, headache and congestion or runny nose    Date COVID symptoms began: 1/16/2023    Do you have any of the following conditions that place you at risk of being very sick from COVID-19? 65 years or older and smoking (current or former)    Is patient eligible to continue? Yes, established patient, 12 years or older weighing at least 88.2 lbs, who has COVID symptoms that started in the past 5 days and is at risk for being very sick from COVID-19.       Have you received monoclonal antibodies or oral antiviral medications since testing positive to COVID-19? No    Are you currently hospitalized for COVID-19? No    Do you have a history of hepatitis? No    Are you currently pregnant or nursing? No    Do you have a clinically significant hypersensitivity to nirmatrelvir, ritonavir, or molnupiravir? No    Do you have any history of severe renal impairment (eGFR < 30mL/min)? No    Do you have any history of hepatic impairment or abnormalities (e.g. hepatic panel, ALT, AST, ALK Phos, bilirubin)? No    Have you had a coronary stent placed in the previous 6 months? No    Is patient eligible to continue?   Yes, patient meets all eligibility requirements for the RN COVID treatment (as denoted by all no responses above).     Current Outpatient Medications   Medication Sig Dispense Refill     amLODIPine (NORVASC) 5 MG tablet Take 1 tablet (5 mg) by mouth daily 90 tablet 3     aspirin 81 MG tablet Take 1 tablet by mouth daily.        atorvastatin (LIPITOR) 40 MG tablet Take 1 tablet (40 mg) by mouth daily 90 tablet 3     lisinopril (ZESTRIL) 5 MG tablet Take 1 tablet (5 mg) by mouth daily 90 tablet 3     nitroGLYcerin (NITROSTAT) 0.4 MG sublingual tablet For chest pain place 1 tablet under the tongue every 5 minutes for 3 doses. If symptoms persist 5 minutes after 1st dose call 911. 20 tablet 1     omeprazole (PRILOSEC) 20 MG DR capsule 1 po daily 90 capsule 3     timolol maleate (TIMOPTIC) 0.5 % ophthalmic solution Place 1 drop into the right eye daily 5 mL 11     Vitamin D3 (CHOLECALCIFEROL) 25 mcg (1000 units) tablet Take 1 tablet (25 mcg) by mouth daily         Medications from List 1 of the standing order (on medications that exclude the use of Paxlovid) that patient is taking: NONE. Is patient taking Tripp's Wort? No  Is patient taking Eric's Wort or any meds from List 1? No.   Medications from List 2 of the standing order (on meds that provider needs to adjust) that patient is taking: amlodipine (Norvasc), explained a provider visit is necessary to discuss medication adjustments while taking Paxlovid. Is patient on any of the meds from List 2? Yes. Patient will be transferred to a  at the end of this call. Sierra Robert RN

## 2023-01-17 NOTE — PROGRESS NOTES
Donn is a 77 year old who is being evaluated via a billable telephone visit.      What phone number would you like to be contacted at? [phone]  How would you like to obtain your AVS? Yulietharbrenda  Distant Location (provider location):  On-site    Assessment & Plan       ICD-10-CM    1. Infection due to 2019 novel coronavirus  U07.1 nirmatrelvir and ritonavir (PAXLOVID) therapy pack      2. Hypertension goal BP (blood pressure) < 140/90  I10       3. Hyperlipidemia LDL goal <70  E78.5         We discussed his COVID infection and we will go ahead and treat him with Paxlovid in the usual fashion  His renal function has been normal, so standard dosing was prescribed  I discussed drug interactions we will have him hold his atorvastatin while on the treatment and cut his amlodipine dosing in half while on the treatment  He was advised to isolate for the first 5 days of illness and then to mask for the next 5 days if out and about in public    Return for a recheck if symptoms worsen or fail to improve.    Andrea Myrick MD  [facility]   Donn is a 77 year old, presenting for the following health issues:  Covid Concern      HPI       COVID-19 Symptom Review  How many days ago did these symptoms start? Started 01/15/23, tested pos for covid 01/16/23    Are any of the following symptoms significant for you?    New or worsening difficulty breathing? No    Worsening cough? No, mild cough    Fever or chills? Yes, I felt feverish or had chills.    Headache: YES    Sore throat: No    Chest pain: No    Diarrhea: No    Body aches? No    What treatments has patient tried? Acetaminophen   Does patient live in a nursing home, group home, or shelter? No  Does patient have a way to get food/medications during quarantined? Yes, I have a friend or family member who can help me.    He has had no known prior history of COVID.  He is fully vaccinated and boostered against COVID.  His most recent booster  was the bivalent 1 this past November.  He does have a history of hypertension and CAD and other health conditions as per his chart.  He is interested in treatment for his COVID infection.    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     Decreased vision in both eyes     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     Facility-Administered Medications Ordered in Other Visits   Medication     sodium chloride (PF) 0.9% PF flush 10 mL         Review of Systems   Noncontributory except as above.      Objective             Physical Exam     PSYCH: Alert and oriented times 3; coherent speech, normal   rate and volume, able to articulate logical thoughts, able   to abstract reason, no tangential thoughts, no hallucinations   or delusions  His affect is normal  RESP: Minimal cough, no audible wheezing, able to talk  in full sentences  Remainder of exam unable to be completed due to telephone visits    Past lab results reviewed, including prior renal function tests.        Phone call duration: 11 minutes

## 2023-01-17 NOTE — TELEPHONE ENCOUNTER
"  Reason for Disposition    [1] HIGH RISK for severe COVID complications (e.g., weak immune system, age > 64 years, obesity with BMI of 30 or higher, pregnant, chronic lung disease or other chronic medical condition) AND [2] COVID symptoms (e.g., cough, fever)  (Exceptions: Already seen by PCP and no new or worsening symptoms.)    Additional Information    Negative: SEVERE difficulty breathing (e.g., struggling for each breath, speaks in single words)    Negative: Difficult to awaken or acting confused (e.g., disoriented, slurred speech)    Negative: Bluish (or gray) lips or face now    Negative: Shock suspected (e.g., cold/pale/clammy skin, too weak to stand, low BP, rapid pulse)    Negative: Sounds like a life-threatening emergency to the triager    Negative: SEVERE or constant chest pain or pressure  (Exception: Mild central chest pain, present only when coughing.)    Negative: MODERATE difficulty breathing (e.g., speaks in phrases, SOB even at rest, pulse 100-120)    Negative: Headache and stiff neck (can't touch chin to chest)    Negative: Oxygen level (e.g., pulse oximetry) 90 percent or lower    Negative: Chest pain or pressure  (Exception: MILD central chest pain, present only when coughing)    Negative: Patient sounds very sick or weak to the triager    Negative: MILD difficulty breathing (e.g., minimal/no SOB at rest, SOB with walking, pulse <100)    Negative: Fever > 103 F (39.4 C)    Negative: [1] Fever > 101 F (38.3 C) AND [2] over 60 years of age    Negative: [1] Fever > 100.0 F (37.8 C) AND [2] bedridden (e.g., nursing home patient, CVA, chronic illness, recovering from surgery)    Answer Assessment - Initial Assessment Questions  1. COVID-19 DIAGNOSIS: \"Who made your COVID-19 diagnosis?\" \"Was it confirmed by a positive lab test or self-test?\" If not diagnosed by a doctor (or NP/PA), ask \"Are there lots of cases (community spread) where you live?\" Note: See [facility] health department website, if " "unsure.      Home COVID19 rapid test   2. COVID-19 EXPOSURE: \"Was there any known exposure to COVID before the symptoms began?\" CDC Definition of close contact: within 6 feet (2 meters) for a total of 15 minutes or more over a 24-hour period.       Yes, wife is COVID19 positive  3. ONSET: \"When did the COVID-19 symptoms start?\"       1/16/2023  4. WORST SYMPTOM: \"What is your worst symptom?\" (e.g., cough, fever, shortness of breath, muscle aches)      Drained, low energy   5. COUGH: \"Do you have a cough?\" If Yes, ask: \"How bad is the cough?\"        Yes, dry cough, not too bad   6. FEVER: \"Do you have a fever?\" If Yes, ask: \"What is your temperature, how was it measured, and when did it start?\"      Denies fever   7. RESPIRATORY STATUS: \"Describe your breathing?\" (e.g., shortness of breath, wheezing, unable to speak)       Breathing fine   8. BETTER-SAME-WORSE: \"Are you getting better, staying the same or getting worse compared to yesterday?\"  If getting worse, ask, \"In what way?\"      Worse than yesterday (symptoms started yesterday  9. HIGH RISK DISEASE: \"Do you have any chronic medical problems?\" (e.g., asthma, heart or lung disease, weak immune system, obesity, etc.)      CAD with history of stent placements   10. VACCINE: \"Have you had the COVID-19 vaccine?\" If Yes, ask: \"Which one, how many shots, when did you get it?\"        Yes, pfizer, 5 shots total. Last booster was 11/21/2022  11. BOOSTER: \"Have you received your COVID-19 booster?\" If Yes, ask: \"Which one and when did you get it?\"        Yes, pfizer 11/21/2022  12. PREGNANCY: \"Is there any chance you are pregnant?\" \"When was your last menstrual period?\"        N/a   13. OTHER SYMPTOMS: \"Do you have any other symptoms?\"  (e.g., chills, fatigue, headache, loss of smell or taste, muscle pain, sore throat)        Chills, fatigue, HA, muscle aches, runny nose (does have chronic runny nose from allergies)  14. O2 SATURATION MONITOR:  \"Do you use an oxygen " "saturation monitor (pulse oximeter) at home?\" If Yes, ask \"What is your reading (oxygen level) today?\" \"What is your usual oxygen saturation reading?\" (e.g., 95%)        N/a    Protocols used: CORONAVIRUS (COVID-19) DIAGNOSED OR [id number]    "

## 2023-03-31 ENCOUNTER — TRANSFERRED RECORDS (OUTPATIENT)
Dept: HEALTH INFORMATION MANAGEMENT | Facility: CLINIC | Age: 78
End: 2023-03-31
Payer: MEDICARE

## 2023-06-01 ENCOUNTER — HEALTH MAINTENANCE LETTER (OUTPATIENT)
Age: 78
End: 2023-06-01

## 2023-08-01 NOTE — PATIENT INSTRUCTIONS
On short-term rehab POST-OP CATARACT INSTRUCTIONS    Use the following drops in the left eye:    Ketorolac (tan cap) 3 times a day .  Prednisolone (pink or white cap) 3 times a day  Diclofenac (gray cap) 3 times a day    ~Wait at least 5 minutes between drops.    ~Wear eye shield at night for one week.    ~Do not exert yourself to the point that you are red in the face for one week.    ~If your vision worsens, eye becomes increasingly red, or becomes painful, call [phone].    ~If you are taking glaucoma medications, continue as usual.    ~OK to resume aspirin and/or other blood thinners.    Donn Price M.D.

## 2024-01-08 ASSESSMENT — ENCOUNTER SYMPTOMS
ABDOMINAL PAIN: 0
MYALGIAS: 0
CHILLS: 1
COUGH: 0
HEARTBURN: 0
SHORTNESS OF BREATH: 0
FREQUENCY: 1
ARTHRALGIAS: 0
HEMATOCHEZIA: 0
DIARRHEA: 0
NAUSEA: 0
HEADACHES: 0
PALPITATIONS: 0
DYSURIA: 0
CONSTIPATION: 1
WEAKNESS: 1
DIZZINESS: 0
SORE THROAT: 0
PARESTHESIAS: 0
NERVOUS/ANXIOUS: 0
EYE PAIN: 0
JOINT SWELLING: 0
FEVER: 0
HEMATURIA: 0

## 2024-01-08 ASSESSMENT — ACTIVITIES OF DAILY LIVING (ADL): CURRENT_FUNCTION: NO ASSISTANCE NEEDED

## 2024-01-15 ENCOUNTER — OFFICE VISIT (OUTPATIENT)
Dept: FAMILY MEDICINE | Facility: CLINIC | Age: 79
End: 2024-01-15
Payer: MEDICARE

## 2024-01-15 VITALS
DIASTOLIC BLOOD PRESSURE: 75 MMHG | SYSTOLIC BLOOD PRESSURE: 156 MMHG | OXYGEN SATURATION: 99 % | WEIGHT: 144.13 LBS | BODY MASS INDEX: 24.61 KG/M2 | RESPIRATION RATE: 14 BRPM | HEIGHT: 64 IN | HEART RATE: 56 BPM | TEMPERATURE: 97.2 F

## 2024-01-15 DIAGNOSIS — Z00.00 ROUTINE GENERAL MEDICAL EXAMINATION AT A HEALTH CARE FACILITY: ICD-10-CM

## 2024-01-15 DIAGNOSIS — H54.7 VISION PROBLEM: ICD-10-CM

## 2024-01-15 DIAGNOSIS — H93.11 TINNITUS, RIGHT: ICD-10-CM

## 2024-01-15 DIAGNOSIS — E55.9 VITAMIN D DEFICIENCY DISEASE: ICD-10-CM

## 2024-01-15 DIAGNOSIS — Z00.00 ENCOUNTER FOR MEDICARE ANNUAL WELLNESS EXAM: Primary | ICD-10-CM

## 2024-01-15 DIAGNOSIS — R73.01 IMPAIRED FASTING GLUCOSE: ICD-10-CM

## 2024-01-15 DIAGNOSIS — K21.9 GASTROESOPHAGEAL REFLUX DISEASE, UNSPECIFIED WHETHER ESOPHAGITIS PRESENT: ICD-10-CM

## 2024-01-15 DIAGNOSIS — I25.10 CORONARY ARTERY DISEASE INVOLVING NATIVE CORONARY ARTERY OF NATIVE HEART WITHOUT ANGINA PECTORIS: ICD-10-CM

## 2024-01-15 DIAGNOSIS — I10 HYPERTENSION GOAL BP (BLOOD PRESSURE) < 140/90: ICD-10-CM

## 2024-01-15 DIAGNOSIS — R53.83 FATIGUE, UNSPECIFIED TYPE: ICD-10-CM

## 2024-01-15 DIAGNOSIS — Z23 HIGH PRIORITY FOR 2019-NCOV VACCINE: ICD-10-CM

## 2024-01-15 DIAGNOSIS — E78.5 HYPERLIPIDEMIA LDL GOAL <70: ICD-10-CM

## 2024-01-15 DIAGNOSIS — Z23 NEED FOR PROPHYLACTIC VACCINATION AND INOCULATION AGAINST INFLUENZA: ICD-10-CM

## 2024-01-15 LAB
ALBUMIN SERPL BCG-MCNC: 4.2 G/DL (ref 3.5–5.2)
ALP SERPL-CCNC: 100 U/L (ref 40–150)
ALT SERPL W P-5'-P-CCNC: 17 U/L (ref 0–70)
ANION GAP SERPL CALCULATED.3IONS-SCNC: 10 MMOL/L (ref 7–15)
AST SERPL W P-5'-P-CCNC: 33 U/L (ref 0–45)
BASOPHILS # BLD AUTO: 0 10E3/UL (ref 0–0.2)
BASOPHILS NFR BLD AUTO: 1 %
BILIRUB SERPL-MCNC: 0.4 MG/DL
BUN SERPL-MCNC: 21.7 MG/DL (ref 8–23)
CALCIUM SERPL-MCNC: 9.1 MG/DL (ref 8.8–10.2)
CHLORIDE SERPL-SCNC: 106 MMOL/L (ref 98–107)
CHOLEST SERPL-MCNC: 141 MG/DL
CREAT SERPL-MCNC: 0.91 MG/DL (ref 0.67–1.17)
DEPRECATED HCO3 PLAS-SCNC: 26 MMOL/L (ref 22–29)
EGFRCR SERPLBLD CKD-EPI 2021: 86 ML/MIN/1.73M2
EOSINOPHIL # BLD AUTO: 0.2 10E3/UL (ref 0–0.7)
EOSINOPHIL NFR BLD AUTO: 3 %
ERYTHROCYTE [DISTWIDTH] IN BLOOD BY AUTOMATED COUNT: 13.3 % (ref 10–15)
FASTING STATUS PATIENT QL REPORTED: YES
GLUCOSE SERPL-MCNC: 100 MG/DL (ref 70–99)
HBA1C MFR BLD: 5.8 % (ref 0–5.6)
HCT VFR BLD AUTO: 38.7 % (ref 40–53)
HDLC SERPL-MCNC: 79 MG/DL
HGB BLD-MCNC: 12.6 G/DL (ref 13.3–17.7)
IMM GRANULOCYTES # BLD: 0 10E3/UL
IMM GRANULOCYTES NFR BLD: 0 %
LDLC SERPL CALC-MCNC: 52 MG/DL
LYMPHOCYTES # BLD AUTO: 1.8 10E3/UL (ref 0.8–5.3)
LYMPHOCYTES NFR BLD AUTO: 32 %
MCH RBC QN AUTO: 30.7 PG (ref 26.5–33)
MCHC RBC AUTO-ENTMCNC: 32.6 G/DL (ref 31.5–36.5)
MCV RBC AUTO: 94 FL (ref 78–100)
MONOCYTES # BLD AUTO: 0.4 10E3/UL (ref 0–1.3)
MONOCYTES NFR BLD AUTO: 8 %
NEUTROPHILS # BLD AUTO: 3.1 10E3/UL (ref 1.6–8.3)
NEUTROPHILS NFR BLD AUTO: 57 %
NONHDLC SERPL-MCNC: 62 MG/DL
PLATELET # BLD AUTO: 196 10E3/UL (ref 150–450)
POTASSIUM SERPL-SCNC: 4.3 MMOL/L (ref 3.4–5.3)
PROT SERPL-MCNC: 7.1 G/DL (ref 6.4–8.3)
RBC # BLD AUTO: 4.1 10E6/UL (ref 4.4–5.9)
SODIUM SERPL-SCNC: 142 MMOL/L (ref 135–145)
TRIGL SERPL-MCNC: 48 MG/DL
TSH SERPL DL<=0.005 MIU/L-ACNC: 2.67 UIU/ML (ref 0.3–4.2)
WBC # BLD AUTO: 5.5 10E3/UL (ref 4–11)

## 2024-01-15 PROCEDURE — 85025 COMPLETE CBC W/AUTO DIFF WBC: CPT | Performed by: FAMILY MEDICINE

## 2024-01-15 PROCEDURE — G0439 PPPS, SUBSEQ VISIT: HCPCS | Performed by: FAMILY MEDICINE

## 2024-01-15 PROCEDURE — 36415 COLL VENOUS BLD VENIPUNCTURE: CPT | Performed by: FAMILY MEDICINE

## 2024-01-15 PROCEDURE — 90480 ADMN SARSCOV2 VAC 1/ONLY CMP: CPT | Performed by: FAMILY MEDICINE

## 2024-01-15 PROCEDURE — 82306 VITAMIN D 25 HYDROXY: CPT | Performed by: FAMILY MEDICINE

## 2024-01-15 PROCEDURE — 80061 LIPID PANEL: CPT | Performed by: FAMILY MEDICINE

## 2024-01-15 PROCEDURE — 91320 SARSCV2 VAC 30MCG TRS-SUC IM: CPT | Performed by: FAMILY MEDICINE

## 2024-01-15 PROCEDURE — G0008 ADMIN INFLUENZA VIRUS VAC: HCPCS | Performed by: FAMILY MEDICINE

## 2024-01-15 PROCEDURE — 90662 IIV NO PRSV INCREASED AG IM: CPT | Performed by: FAMILY MEDICINE

## 2024-01-15 PROCEDURE — 83036 HEMOGLOBIN GLYCOSYLATED A1C: CPT | Performed by: FAMILY MEDICINE

## 2024-01-15 PROCEDURE — 84443 ASSAY THYROID STIM HORMONE: CPT | Performed by: FAMILY MEDICINE

## 2024-01-15 PROCEDURE — 80053 COMPREHEN METABOLIC PANEL: CPT | Performed by: FAMILY MEDICINE

## 2024-01-15 PROCEDURE — 99213 OFFICE O/P EST LOW 20 MIN: CPT | Mod: 25 | Performed by: FAMILY MEDICINE

## 2024-01-15 RX ORDER — LISINOPRIL 10 MG/1
10 TABLET ORAL DAILY
Qty: 90 TABLET | Refills: 0 | Status: SHIPPED | OUTPATIENT
Start: 2024-01-15 | End: 2024-04-15

## 2024-01-15 RX ORDER — AMLODIPINE BESYLATE 5 MG/1
5 TABLET ORAL DAILY
Qty: 90 TABLET | Refills: 3 | Status: SHIPPED | OUTPATIENT
Start: 2024-01-15

## 2024-01-15 RX ORDER — RESPIRATORY SYNCYTIAL VIRUS VACCINE 120MCG/0.5
0.5 KIT INTRAMUSCULAR ONCE
Qty: 1 EACH | Refills: 0 | Status: CANCELLED | OUTPATIENT
Start: 2024-01-15 | End: 2024-01-15

## 2024-01-15 RX ORDER — ATORVASTATIN CALCIUM 40 MG/1
40 TABLET, FILM COATED ORAL DAILY
Qty: 90 TABLET | Refills: 3 | Status: SHIPPED | OUTPATIENT
Start: 2024-01-15

## 2024-01-15 ASSESSMENT — ENCOUNTER SYMPTOMS
CONSTIPATION: 1
NERVOUS/ANXIOUS: 0
COUGH: 0
FEVER: 0
NAUSEA: 0
SHORTNESS OF BREATH: 0
WEAKNESS: 1
PARESTHESIAS: 0
HEMATOCHEZIA: 0
SORE THROAT: 0
FREQUENCY: 1
HEMATURIA: 0
PALPITATIONS: 0
CHILLS: 1
EYE PAIN: 0
ARTHRALGIAS: 0
JOINT SWELLING: 0
HEARTBURN: 0
HEADACHES: 0
ABDOMINAL PAIN: 0
DIARRHEA: 0
MYALGIAS: 0
DYSURIA: 0
DIZZINESS: 0

## 2024-01-15 ASSESSMENT — ACTIVITIES OF DAILY LIVING (ADL): CURRENT_FUNCTION: NO ASSISTANCE NEEDED

## 2024-01-15 ASSESSMENT — PAIN SCALES - GENERAL: PAINLEVEL: NO PAIN (0)

## 2024-01-15 NOTE — PATIENT INSTRUCTIONS
Increase lisinopril to 10 mg daily    See us in next couple months    Check blood pressure occasionally, keep record of readings    Continue other meds    Keep working on healthy diet/exercise     We will send you lab results           Patient Education   Personalized Prevention Plan  You are due for the preventive services outlined below.  Your care team is available to assist you in scheduling these services.  If you have already completed any of these items, please share that information with your care team to update in your medical record.  Health Maintenance Due   Topic Date Due    Diptheria Tetanus Pertussis (DTAP/TDAP/TD) Vaccine (1 - Tdap) Never done    Zoster (Shingles) Vaccine (1 of 2) Never done    RSV VACCINE (Pregnancy & 60+) (1 - 1-dose 60+ series) Never done    Annual Wellness Visit  03/07/2023    Flu Vaccine (1) 09/01/2023    COVID-19 Vaccine (6 - 2023-24 season) 09/01/2023    Eye Exam  11/02/2023    ANNUAL REVIEW OF HM ORDERS  01/05/2024     Learning About Dietary Guidelines  What are the Dietary Guidelines for Americans?     Dietary Guidelines for Americans provide tips for eating well and staying healthy. This helps reduce the risk for long-term (chronic) diseases.  These guidelines recommend that you:  Eat and drink the right amount for you. The U.[place] government's food guide is called MyPlate. It can help you make your own well-balanced eating plan.  Try to balance your eating with your activity. This helps you stay at a healthy weight.  Drink alcohol in moderation, if at all.  Limit foods high in salt, saturated fat, trans fat, and added sugar.  These guidelines are from the U.S. Department of Agriculture and the U.S. Department of Health and Human Services. They are updated every 5 years.  What is MyPlate?  MyPlate is the U.[place] government's food guide. It can help you make your own well-balanced eating plan. A balanced eating plan means that you eat enough, but not too much, and that your  "food gives you the nutrients you need to stay healthy.  MyPlate focuses on eating plenty of whole grains, fruits, and vegetables, and on limiting fat and sugar. It is available online at www.ChooseMyPlate.gov.  How can you get started?  If you're trying to eat healthier, you can slowly change your eating habits over time. You don't have to make big changes all at once. Start by adding one or two healthy foods to your meals each day.  Grains  Choose whole-grain breads and cereals and whole-wheat pasta and whole-grain crackers.  Vegetables  Eat a variety of vegetables every day. They have lots of nutrients and are part of a heart-healthy diet.  Fruits  Eat a variety of fruits every day. Fruits contain lots of nutrients. Choose fresh fruit instead of fruit juice.  Protein foods  Choose fish and lean poultry more often. Eat red meat and fried meats less often. Dried beans, tofu, and nuts are also good sources of protein.  Dairy  Choose low-fat or fat-free products from this food group. If you have problems digesting milk, try eating cheese or yogurt instead.  Fats and oils  Limit fats and oils if you're trying to cut calories. Choose healthy fats when you cook. These include canola oil and olive oil.  Where can you learn more?  Go to https://www.Relayr.net/patiented  Enter D676 in the search box to learn more about \"Learning About Dietary Guidelines.\"  Current as of: February 28, 2023               Content Version: 13.8    7341-9201 [employer].   Care instructions adapted under license by your healthcare professional. If you have questions about a medical condition or this instruction, always ask your healthcare professional. [employer] disclaims any warranty or liability for your use of this information.      Hearing Loss: Care Instructions  Overview     Hearing loss is a sudden or slow decrease in how well you hear. It can range from slight to profound. Permanent hearing loss can occur " with aging. It also can happen when you are exposed long-term to loud noise. Examples include listening to loud music, riding motorcycles, or being around other loud machines.  Hearing loss can affect your work and home life. It can make you feel lonely or depressed. You may feel that you have lost your independence. But hearing aids and other devices can help you hear better and feel connected to others.  Follow-up care is a key part of your treatment and safety. Be sure to make and go to all appointments, and call your doctor if you are having problems. It's also a good idea to know your test results and keep a list of the medicines you take.  How can you care for yourself at home?  Avoid loud noises whenever possible. This helps keep your hearing from getting worse.  Always wear hearing protection around loud noises.  Wear a hearing aid as directed.  A professional can help you pick a hearing aid that will work best for you.  You can also get hearing aids over the counter for mild to moderate hearing loss.  Have hearing tests as your doctor suggests. They can show whether your hearing has changed. Your hearing aid may need to be adjusted.  Use other devices as needed. These may include:  Telephone amplifiers and hearing aids that can connect to a television, stereo, radio, or microphone.  Devices that use lights or vibrations. These alert you to the doorbell, a ringing telephone, or a baby monitor.  Television closed-captioning. This shows the words at the bottom of the screen. Most new TVs can do this.  TTY (text telephone). This lets you type messages back and forth on the telephone instead of talking or listening. These devices are also called TDD. When messages are typed on the keyboard, they are sent over the phone line to a receiving TTY. The message is shown on a monitor.  Use text messaging, social media, and email if it is hard for you to communicate by telephone.  Try to learn a listening technique  "called speechreading. It is not lipreading. You pay attention to people's gestures, expressions, posture, and tone of voice. These clues can help you understand what a person is saying. Face the person you are talking to, and have them face you. Make sure the lighting is good. You need to see the other person's face clearly.  Think about counseling if you need help to adjust to your hearing loss.  When should you call for help?  Watch closely for changes in your health, and be sure to contact your doctor if:    You think your hearing is getting worse.     You have new symptoms, such as dizziness or nausea.   Where can you learn more?  Go to https://www.Arkivum.net/patiented  Enter R798 in the search box to learn more about \"Hearing Loss: Care Instructions.\"  Current as of: February 28, 2023               Content Version: 13.8    8476-2718 [employer].   Care instructions adapted under license by your healthcare professional. If you have questions about a medical condition or this instruction, always ask your healthcare professional. [employer] disclaims any warranty or liability for your use of this information.      Bladder Training: Care Instructions  Your Care Instructions     Bladder training is used to treat urge incontinence and stress incontinence. Urge incontinence means that the need to urinate comes on so fast that you can't get to a toilet in time. Stress incontinence means that you leak urine because of pressure on your bladder. For example, it may happen when you laugh, cough, or lift something heavy.  Bladder training can increase how long you can wait before you have to urinate. It can also help your bladder hold more urine. And it can give you better control over the urge to urinate.  It is important to remember that bladder training takes a few weeks to a few months to make a difference. You may not see results right away, but don't give up.  Follow-up care is a key " part of your treatment and safety. Be sure to make and go to all appointments, and call your doctor if you are having problems. It's also a good idea to know your test results and keep a list of the medicines you take.  How can you care for yourself at home?  Work with your doctor to come up with a bladder training program that is right for you. You may use one or more of the following methods.  Delayed urination  In the beginning, try to keep from urinating for 5 minutes after you first feel the need to go.  While you wait, take deep, slow breaths to relax. Kegel exercises can also help you delay the need to go to the bathroom.  After some practice, when you can easily wait 5 minutes to urinate, try to wait 10 minutes before you urinate.  Slowly increase the waiting period until you are able to control when you have to urinate.  Scheduled urination  Empty your bladder when you first wake up in the morning.  Schedule times throughout the day when you will urinate.  Start by going to the bathroom every hour, even if you don't need to go.  Slowly increase the time between trips to the bathroom.  When you have found a schedule that works well for you, keep doing it.  If you wake up during the night and have to urinate, do it. Apply your schedule to waking hours only.  Kegel exercises  These tighten and strengthen pelvic muscles, which can help you control the flow of urine. (If doing these exercises causes pain, stop doing them and talk with your doctor.) To do Kegel exercises:  Squeeze your muscles as if you were trying not to pass gas. Or squeeze your muscles as if you were stopping the flow of urine. Your belly, legs, and buttocks shouldn't move.  Hold the squeeze for 3 seconds, then relax for 5 to 10 seconds.  Start with 3 seconds, then add 1 second each week until you are able to squeeze for 10 seconds.  Repeat the exercise 10 times a session. Do 3 to 8 sessions a day.  When should you call for help?  Watch  "closely for changes in your health, and be sure to contact your doctor if:    Your incontinence is getting worse.     You do not get better as expected.   Where can you learn more?  Go to https://www.Watch Over Me.net/patiented  Enter V684 in the search box to learn more about \"Bladder Training: Care Instructions.\"  Current as of: February 28, 2023               Content Version: 13.8    5807-5137 [employer].   Care instructions adapted under license by your healthcare professional. If you have questions about a medical condition or this instruction, always ask your healthcare professional. [employer] disclaims any warranty or liability for your use of this information.      Your Health Risk Assessment indicates you feel you are not in good emotional health.    Recreation   Recreation is not limited to sports and team events. It includes any activity that provides relaxation, interest, enjoyment, and exercise. Recreation provides an outlet for physical, mental, and social energy. It can give a sense of worth and achievement. It can help you stay healthy.    Mental Exercise and Social Involvement  Mental and emotional health is as important as physical health. Keep in touch with friends and family. Stay as active as possible. Continue to learn and challenge yourself.   Things you can do to stay mentally active are:  Learn something new, like a foreign language or musical instrument.   Play SCRABBLE or do crossword puzzles. If you cannot find people to play these games with you at home, you can play them with others on your computer through the Internet.   Join a games club--anything from card games to chess or checkers or lawn bowling.   Start a new hobby.   Go back to school.   Volunteer.   Read.   Keep up with world events.     "

## 2024-01-15 NOTE — PROGRESS NOTES
"SUBJECTIVE:   Donn is a 78 year old, presenting for the following:  Wellness Visit (Fasting), Imm/Inj (Flu Shot), and Imm/Inj (COVID-19 VACCINE)        1/15/2024    10:33 AM   Additional Questions   Roomed by Padmini Reynaga       Are you in the first 12 months of your Medicare coverage?  No    Healthy Habits:     In general, how would you rate your overall health?  Good    Frequency of exercise:  6-7 days/week    Duration of exercise:  30-45 minutes    Do you usually eat at least 4 servings of fruit and vegetables a day, include whole grains    & fiber and avoid regularly eating high fat or \"junk\" foods?  No    Taking medications regularly:  Yes    Barriers to taking medications:  None    Medication side effects:  None    Ability to successfully perform activities of daily living:  No assistance needed    Home Safety:  No safety concerns identified    Hearing Impairment:  Difficulty following a conversation in a noisy restaurant or crowded room, feel that people are mumbling or not speaking clearly, need to ask people to speak up or repeat themselves and difficulty understanding soft or whispered speech    In the past 6 months, have you been bothered by leaking of urine? Yes    In general, how would you rate your overall mental or emotional health?  Fair    Additional concerns today:  No      Today's PHQ-2 Score:       1/15/2024    10:14 AM   PHQ-2 ( 1999 [employer])   Q1: Little interest or pleasure in doing things 0   Q2: Feeling down, depressed or hopeless 1   PHQ-2 Score 1   Q1: Little interest or pleasure in doing things Not at all   Q2: Feeling down, depressed or hopeless Several days   PHQ-2 Score 1           Have you ever done Advance Care Planning? (For example, a Health Directive, POLST, or a discussion with a medical provider or your loved ones about your wishes): Yes, advance care planning is on file.       Fall risk  Fallen 2 or more times in the past year?: No  Any fall with injury in the past year?: " No    Cognitive Screening   1) Repeat 3 items (Leader, Season, Table)     2) Clock draw:   ABNORMAL wrong time  3) 3 item recall:    Recalls 3 objects  Results: 3 items recalled: COGNITIVE IMPAIRMENT LESS LIKELY    Mini-CogTM Copyright S Ahmet. Licensed by the author for use in [facility]; reprinted with permission (hardeep@[facility]). All rights reserved.      Do you have sleep apnea, excessive snoring or daytime drowsiness? : no    Reviewed and updated as needed this visit by clinical staff   Tobacco  Allergies  Meds              Reviewed and updated as needed this visit by Provider                 Social History     Tobacco Use    Smoking status: Former     Types: Cigarettes     Quit date: 1972     Years since quittin.0     Passive exposure: Never    Smokeless tobacco: Never    Tobacco comments:     non-smoking household   Substance Use Topics    Alcohol use: Yes     Comment: occ beer / wine             2024     7:49 PM   Alcohol Use   Prescreen: >3 drinks/day or >7 drinks/week? No          No data to display                Do you have a current opioid prescription? No  Do you use any other controlled substances or medications that are not prescribed by a provider? None                 Current providers sharing in care for this patient include:    Patient Care Team:  Ha Anderson MD as PCP - General  Iesha Pedraza MD as Referring Physician (Emergency Medicine)  Ha Anderson MD as Assigned PCP  Donn Price MD as Assigned Surgical Provider    The following health maintenance items are reviewed in Epic and correct as of today:  Health Maintenance   Topic Date Due    DTAP/TDAP/TD IMMUNIZATION (1 - Tdap) Never done    ZOSTER IMMUNIZATION (1 of 2) Never done    RSV VACCINE (Pregnancy & 60+) (1 - 1-dose 60+ series) Never done    MEDICARE ANNUAL WELLNESS VISIT  2023    INFLUENZA VACCINE (1) 2023    COVID-19 Vaccine ( - -24 season) 2023    EYE EXAM   11/02/2023    ANNUAL REVIEW OF HM ORDERS  01/05/2024    FALL RISK ASSESSMENT  01/15/2025    LIPID  03/07/2027    ADVANCE CARE PLANNING  01/15/2029    HEPATITIS C SCREENING  Completed    PHQ-2 (once per calendar year)  Completed    Pneumococcal Vaccine: 65+ Years  Completed    IPV IMMUNIZATION  Aged Out    HPV IMMUNIZATION  Aged Out    MENINGITIS IMMUNIZATION  Aged Out    RSV MONOCLONAL ANTIBODY  Aged Out    COLORECTAL CANCER SCREENING  Discontinued                 Review of Systems   Constitutional:  Positive for chills. Negative for fever.   HENT:  Positive for hearing loss. Negative for congestion, ear pain and sore throat.    Eyes:  Positive for visual disturbance. Negative for pain.   Respiratory:  Negative for cough and shortness of breath.    Cardiovascular:  Negative for chest pain, palpitations and peripheral edema.   Gastrointestinal:  Positive for constipation. Negative for abdominal pain, diarrhea, heartburn, hematochezia and nausea.   Genitourinary:  Positive for frequency and urgency. Negative for dysuria, genital sores, hematuria, impotence and penile discharge.   Musculoskeletal:  Negative for arthralgias, joint swelling and myalgias.   Skin:  Negative for rash.   Neurological:  Positive for weakness. Negative for dizziness, headaches and paresthesias.   Psychiatric/Behavioral:  Negative for mood changes. The patient is not nervous/anxious.      Close to  chainsaw recently      Some  buzzing  in  ear     No obvious change in hearing  right ear    No hearing  aids    Vision stable    No recent illnesses    Gets  cold easy    [place]    Bowels okay    No change  in urination    Drinks a lot of coffeed  Once at night  to urinate    Variable urinary stream    Sometimes  hard to open  bottle    Kids healthy    Blood pressure usually low when home    Eating banana in am helped the leg cramping    Wife retiring soon      OBJECTIVE:   BP (!) 170/79 (BP Location: Left arm, Patient Position: Chair, Cuff  "Size: Adult Regular)   Pulse 56   Temp 97.2  F (36.2  C) (Temporal)   Resp 14   Ht 1.626 m (5' 4\")   Wt 65.4 kg (144 lb 2 oz)   SpO2 99%   BMI 24.74 kg/m   Estimated body mass index is 24.74 kg/m  as calculated from the following:    Height as of this encounter: 1.626 m (5' 4\").    Weight as of this encounter: 65.4 kg (144 lb 2 oz).  Physical Exam  GENERAL: healthy, alert and no distress  EYES: Eyes grossly normal to inspection, PERRL and conjunctivae and sclerae normal  HENT: ear canals and TM's normal, nose and mouth without ulcers or lesions  NECK: no adenopathy, no asymmetry, masses, or scars and thyroid normal to palpation  RESP: lungs clear to auscultation - no rales, rhonchi or wheezes  CV: regular rate and rhythm, normal S1 S2, no S3 or S4, no murmur, click or rub, no peripheral edema and peripheral pulses strong  ABDOMEN: soft, nontender, no hepatosplenomegaly, no masses and bowel sounds normal  MS: no gross musculoskeletal defects noted, no edema  SKIN: no suspicious lesions or rashes  NEURO: Normal strength and tone, mentation intact and speech normal  PSYCH: mentation appears normal, affect normal/bright  Small bruise  right cheek  area ( patient was cutting firewood recently and small piece  of wood hit him)    Diagnostic Test Results:  Labs reviewed in Epic    ASSESSMENT / PLAN:   Donn was seen today for wellness visit, imm/inj and imm/inj.    Diagnoses and all orders for this visit:    Encounter for Medicare annual wellness exam    Routine general medical examination at a health care facility    Hypertension goal BP (blood pressure) < 140/90  -     amLODIPine (NORVASC) 5 MG tablet; Take 1 tablet (5 mg) by mouth daily  -     lisinopril (ZESTRIL) 10 MG tablet; Take 1 tablet (10 mg) by mouth daily    Need for prophylactic vaccination and inoculation against influenza    High priority for 2019-nCoV vaccine    Vision problem  -     Adult Eye  Referral; Future    Hyperlipidemia LDL goal " <70  -     atorvastatin (LIPITOR) 40 MG tablet; Take 1 tablet (40 mg) by mouth daily  -     Lipid panel reflex to direct LDL Fasting; Future  -     Comprehensive metabolic panel; Future  -     Lipid panel reflex to direct LDL Fasting  -     Comprehensive metabolic panel    Gastroesophageal reflux disease, unspecified whether esophagitis present  -     omeprazole (PRILOSEC) 20 MG DR capsule; TAKE 1 CAPSULE BY MOUTH EVERY DAY    Vitamin D deficiency disease  -     Vitamin D Deficiency; Future  -     Vitamin D Deficiency    Fatigue, unspecified type  -     CBC with Platelets & Differential; Future  -     TSH with free T4 reflex; Future  -     CBC with Platelets & Differential  -     TSH with free T4 reflex    Impaired fasting glucose  -     Hemoglobin A1c; Future  -     Hemoglobin A1c    Tinnitus, right    Coronary artery disease involving native coronary artery of native heart without angina pectoris    Other orders  -     INFLUENZA VACCINE 65+ (FLUZONE HD)  -     COVID-19 12+ (2023-24) (PFIZER)  -     PRIMARY CARE FOLLOW-UP SCHEDULING; Future    Discussed multiple issues with patient  Flu and covid shots given here  He could get other advised shots at pharmacy  Blood pressure high even on recheck  He is on very low dose lisinopril; try higher dose and then see us back in a couple months  Be seen sooner if needed   Check labs  Keep working on healthy diet/exercise   No angina, remote history of stents  Never has used nitrolgycerin  Refill needed meds  Schedule eye exam  Patient declined audiology eval ; monitor right ear symptoms     Patient has been advised of split billing requirements and indicates understanding: Yes      COUNSELING:  Reviewed preventive health counseling, as reflected in patient instructions       Regular exercise       Healthy diet/nutrition       Vision screening       Hearing screening       Dental care       Colon cancer screening       Prostate cancer screening        He reports that he quit  smoking about 52 years ago. His smoking use included cigarettes. He has never been exposed to tobacco smoke. He has never used smokeless tobacco.      Appropriate preventive services were discussed with this patient, including applicable screening as appropriate for fall prevention, nutrition, physical activity, Tobacco-use cessation, weight loss and cognition.  Checklist reviewing preventive services available has been given to the patient.    Reviewed patients plan of care and provided an AVS. The Basic Care Plan (routine screening as documented in Health Maintenance) for Donn meets the Care Plan requirement. This Care Plan has been established and reviewed with the Patient.          Ha Anderson MD  [facility]    Identified Health Risks:  I have reviewed Opioid Use Disorder and Substance Use Disorder risk factors and made any needed referrals.   The patient was counseled and encouraged to consider modifying their diet and eating habits. He was provided with information on recommended healthy diet options.  The patient was provided with written information regarding signs of hearing loss.  Information on urinary incontinence and treatment options given to patient.  The patient was provided with suggestions to help him develop a healthy emotional lifestyle.

## 2024-01-16 NOTE — RESULT ENCOUNTER NOTE
"Hemoglobin a1c is still in \"prediabetes\" range.  Other labs here are okay/ stable.    Vitamin D still pending.    Ha Anderson MD  "

## 2024-01-17 LAB — VIT D+METAB SERPL-MCNC: 38 NG/ML (ref 20–50)

## 2024-02-05 ENCOUNTER — OFFICE VISIT (OUTPATIENT)
Dept: AUDIOLOGY | Facility: CLINIC | Age: 79
End: 2024-02-05
Payer: MEDICARE

## 2024-02-05 DIAGNOSIS — H90.3 SENSORINEURAL HEARING LOSS, BILATERAL: Primary | ICD-10-CM

## 2024-02-05 PROCEDURE — 92557 COMPREHENSIVE HEARING TEST: CPT | Performed by: AUDIOLOGIST

## 2024-02-05 PROCEDURE — 92550 TYMPANOMETRY & REFLEX THRESH: CPT | Performed by: AUDIOLOGIST

## 2024-02-05 NOTE — PROGRESS NOTES
AUDIOLOGY REPORT    SUBJECTIVE:  Donn Allen is a 78 year old male who was seen in the Audiology Clinic [facility] on 2/05/24 for audiologic evaluation, referred by self.  The patient has been seen previously in this clinic on 1/25/2017 for assessment and results indicated bilateral sensorineural hearing loss. The patient reports a sensation in the right ear following an exposure to a chainsaw 2 weeks ago. 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 The patient denies  bilateral tinnitus, bilateral otalgia, bilateral drainage, bilateral aural fullness, family history of hearing loss, and dizziness. Patient was unaccompanied to today's visit.     Abuse Screening:  Do you feel unsafe at home or work/school? No  Do you feel threatened by someone? No  Does anyone try to keep you from having contact with others, or doing things outside of your home? No  Physical signs of abuse present? No     Fall Risk Screen:  1. Have you fallen two or more times in the past year? No  2. Have you fallen and had an injury in the past year? No    OBJECTIVE:    Otoscopic exam indicates ears are clear of cerumen bilaterally    NOTE: There are some large bumps in both ear canals     Pure Tone Thresholds assessed using standard techniques  audiometry with good  reliability from 250-8000 Hz bilaterally using insert earphones and circumaural headphones     RIGHT:  normal hearing sensitivity through 3000 Hz then a moderate sensorineural hearing loss    LEFT:    normal hearing sensitivity through 2000 Hz then a mild to moderate sensorineural hearing loss   NOTE: Change in transducers did not merit a change in thresholds.     [place]: Negative     Tympanogram:    RIGHT: hyper mobile     LEFT:   hyper mobile     Reflexes (reported by stimulus ear): 1000 Hz  RIGHT: Ipsilateral is present at normal levels  RIGHT: Contralateral is present at normal levels  LEFT:   Ipsilateral is present at  normal levels  LEFT:   Contralateral is present at normal levels    Speech Reception Threshold:    RIGHT: 15 dB HL    LEFT:   15 dB HL    Word Recognition Score:     RIGHT: 96% at 55 dB HL using NU-6 recorded word list.    LEFT:   96% at 55 dB HL using NU-6 recorded word list.    ASSESSMENT:   Bilateral sensorineural hearing loss      Compared to patient's previous audiogram dated 1/25/2017, hearing has remained stable. Today s results were discussed with the patient in detail.     PLAN:  Patient was counseled regarding hearing loss and impact on communication.  Patient is a good candidate for amplification at this time. It is recommended that the patient see ENT for the right ear sensation.  Please call this clinic with questions regarding these results or recommendations.    Norm Camarena CCC-A  Licensed Audiologist #[id number]  2/5/2024

## 2024-03-12 ENCOUNTER — TELEPHONE (OUTPATIENT)
Dept: OTOLARYNGOLOGY | Facility: CLINIC | Age: 79
End: 2024-03-12

## 2024-03-12 ENCOUNTER — TELEPHONE (OUTPATIENT)
Dept: FAMILY MEDICINE | Facility: CLINIC | Age: 79
End: 2024-03-12

## 2024-03-12 NOTE — LETTER
March 12, 2024    To  Donn Allen  [address] [address]    Your team at [facility] cares about your health. We have reviewed your chart and based on our findings; we are making the following recommendations to better manage your health.     You are in particular need of attention regarding the following:     HYPERTENSION FOLLOW UP: Office Visit    If you have already completed these items, please contact the clinic via phone or   MyChart so your care team can review and update your records. Thank you for   choosing [facility] for your healthcare needs. For any questions,   concerns, or to schedule an appointment please contact our clinic.    Healthy Regards,      Your [facility] Care Team

## 2024-03-12 NOTE — TELEPHONE ENCOUNTER
Phoned patient, determined that it was not our clinic who called him.  He will try to find out what clinic it was.    Katherine Jewell RN Care Coordinator, [facility] 03/12/24 9:26 AM

## 2024-03-12 NOTE — TELEPHONE ENCOUNTER
Patient Quality Outreach    Patient is due for the following:   Hypertension -  Hypertension follow-up visit    Next Steps:   Schedule a office visit for blood pressure recheck    Type of outreach:    Sent letter.    Next Steps:  Reach out within 90 days via Phone.    Max number of attempts reached: Yes. Will try again in 90 days if patient still on fail list.    Questions for provider review:    None           Padmini Reynaga, [facility]  Chart routed to chart closed.

## 2024-03-12 NOTE — TELEPHONE ENCOUNTER
[facility]    Phone Message    May a detailed message be left on voicemail: yes     Reason for Call: Pt states he received an appt noticed stating his appt had moved to 2 today. Can someone reach out to him to discuss. Thank you    Action Taken: Message routed to:  [facility] ENT    Travel Screening: Not Applicable

## 2024-04-01 ENCOUNTER — OFFICE VISIT (OUTPATIENT)
Dept: FAMILY MEDICINE | Facility: CLINIC | Age: 79
End: 2024-04-01
Payer: MEDICARE

## 2024-04-01 VITALS
SYSTOLIC BLOOD PRESSURE: 158 MMHG | HEIGHT: 64 IN | OXYGEN SATURATION: 99 % | RESPIRATION RATE: 16 BRPM | BODY MASS INDEX: 25.61 KG/M2 | TEMPERATURE: 97.4 F | DIASTOLIC BLOOD PRESSURE: 82 MMHG | HEART RATE: 57 BPM | WEIGHT: 150 LBS

## 2024-04-01 DIAGNOSIS — K21.9 GASTROESOPHAGEAL REFLUX DISEASE, UNSPECIFIED WHETHER ESOPHAGITIS PRESENT: ICD-10-CM

## 2024-04-01 DIAGNOSIS — R10.13 EPIGASTRIC PAIN: ICD-10-CM

## 2024-04-01 DIAGNOSIS — I10 HYPERTENSION GOAL BP (BLOOD PRESSURE) < 140/90: Primary | ICD-10-CM

## 2024-04-01 DIAGNOSIS — R07.9 CHEST PAIN, UNSPECIFIED TYPE: ICD-10-CM

## 2024-04-01 PROCEDURE — 99214 OFFICE O/P EST MOD 30 MIN: CPT | Performed by: FAMILY MEDICINE

## 2024-04-01 RX ORDER — RESPIRATORY SYNCYTIAL VIRUS VACCINE 120MCG/0.5
0.5 KIT INTRAMUSCULAR ONCE
Qty: 1 EACH | Refills: 0 | Status: CANCELLED | OUTPATIENT
Start: 2024-04-01 | End: 2024-04-01

## 2024-04-01 RX ORDER — CHLORTHALIDONE 25 MG/1
25 TABLET ORAL DAILY
Qty: 30 TABLET | Refills: 1 | Status: SHIPPED | OUTPATIENT
Start: 2024-04-01 | End: 2024-04-22

## 2024-04-01 NOTE — PATIENT INSTRUCTIONS
Start chlorthalidone    See us in 3-4 weeks, sooner if needed     Continue other meds    Return stool test to check for H pylori    Be seen promptly if symptoms acutely worsen

## 2024-04-01 NOTE — PROGRESS NOTES
Subjective   Donn is a 79 year old, presenting for the following health issues:  Hypertension      4/1/2024    11:35 AM   Additional Questions   Roomed by Angeles HILTON     History of Present Illness       Hypertension: He presents for follow up of hypertension.  He does not check blood pressure  regularly outside of the clinic. Outside blood pressures have been over 140/90. He follows a low salt diet.     He eats 2-3 servings of fruits and vegetables daily.He consumes 0 sweetened beverage(s) daily.He exercises with enough effort to increase his heart rate 30 to 60 minutes per day.  He exercises with enough effort to increase his heart rate 5 days per week.   He is taking medications regularly.         Will spend 2-3 months  in [place] next winter    Never been there    Chest pain for a month  Light pain    Not worse with eating or exertion    Lasts about 5  minutes    Bad when  driving back from [place]     Patient thinks may be coffee related    Has cut that down     No acid taste     Occasional heartburn after had wine the other night     jobs/ walking dogs etc    Not using exercise machine    Feels fine  when walking dogs    [place]    No fever/ chills/ cough    Does not feel sick    Sneezing    Ear acting up    Stress    No bloody or black stools     Take smooth move tea once a week    Breathing fine            Objective    There were no vitals taken for this visit.  There is no height or weight on file to calculate BMI.  Physical Exam  Constitutional:       Appearance: He is well-developed.   HENT:      Head: Normocephalic and atraumatic.   Eyes:      Conjunctiva/sclera: Conjunctivae normal.   Neck:      Vascular: No carotid bruit.   Cardiovascular:      Rate and Rhythm: Normal rate and regular rhythm.      Heart sounds: Normal heart sounds.   Pulmonary:      Effort: Pulmonary effort is normal. No respiratory distress.      Breath sounds: Normal breath sounds.   Chest:      Chest wall:  No tenderness.   Abdominal:      Palpations: Abdomen is soft.      Tenderness: There is no abdominal tenderness. There is no right CVA tenderness or left CVA tenderness.   Neurological:      Mental Status: He is alert and oriented to person, place, and time.      Cranial Nerves: No cranial nerve deficit.   Psychiatric:         Speech: Speech normal.         Behavior: Behavior normal.         No edema    Radials symmetric    ASSESSMENT / PLAN:  (I10) Hypertension goal BP (blood pressure) < 140/90  (primary encounter diagnosis)  Comment: patient needs better blood pressure control.  Add this and then see us in a few weeks.   Plan: chlorthalidone (HYGROTON) 25 MG tablet             (R10.13) Epigastric pain  Comment: check this.  Has had in past.   Plan: Helicobacter pylori Antigen Stool             (R07.9) Chest pain, unspecified type  Comment: patient has no exertional symptoms at all and does walks even power walks.   Plan: monitor, be seen promptly if symptoms acutely worsen.  He did have stress test a little over a year ago.     (K21.9) Gastroesophageal reflux disease, unspecified whether esophagitis present  Comment: on ppi   Plan: continue     Be seen promptly if symptoms acutely worsen       I reviewed the patient's medications, allergies, medical history, family history, and social history.    Ha Anderson MD              Signed Electronically by: Ha Anderson MD

## 2024-04-02 DIAGNOSIS — H40.001 GLAUCOMA SUSPECT OF RIGHT EYE: ICD-10-CM

## 2024-04-02 RX ORDER — TIMOLOL MALEATE 5 MG/ML
1 SOLUTION/ DROPS OPHTHALMIC DAILY
Qty: 5 ML | Refills: 0 | Status: SHIPPED | OUTPATIENT
Start: 2024-04-02 | End: 2024-06-04

## 2024-04-02 NOTE — TELEPHONE ENCOUNTER
Refill request for timolol. Last seen 11/2/22. Pt overdue to be seen. Has no upcoming appt scheduled. Will fwd to provider.

## 2024-04-05 PROCEDURE — 87338 HPYLORI STOOL AG IA: CPT | Performed by: FAMILY MEDICINE

## 2024-04-08 LAB — H PYLORI AG STL QL IA: NEGATIVE

## 2024-04-22 ENCOUNTER — OFFICE VISIT (OUTPATIENT)
Dept: FAMILY MEDICINE | Facility: CLINIC | Age: 79
End: 2024-04-22
Payer: MEDICARE

## 2024-04-22 VITALS
BODY MASS INDEX: 25.67 KG/M2 | RESPIRATION RATE: 14 BRPM | HEIGHT: 64 IN | DIASTOLIC BLOOD PRESSURE: 67 MMHG | HEART RATE: 59 BPM | OXYGEN SATURATION: 100 % | SYSTOLIC BLOOD PRESSURE: 126 MMHG | TEMPERATURE: 97.2 F | WEIGHT: 150.38 LBS

## 2024-04-22 DIAGNOSIS — Z98.61 CAD S/P PERCUTANEOUS CORONARY ANGIOPLASTY: ICD-10-CM

## 2024-04-22 DIAGNOSIS — I10 HYPERTENSION GOAL BP (BLOOD PRESSURE) < 140/90: ICD-10-CM

## 2024-04-22 DIAGNOSIS — I25.10 CAD S/P PERCUTANEOUS CORONARY ANGIOPLASTY: ICD-10-CM

## 2024-04-22 DIAGNOSIS — L29.9 ITCHING: Primary | ICD-10-CM

## 2024-04-22 PROCEDURE — 99213 OFFICE O/P EST LOW 20 MIN: CPT | Performed by: FAMILY MEDICINE

## 2024-04-22 RX ORDER — CHLORTHALIDONE 25 MG/1
25 TABLET ORAL DAILY
Qty: 90 TABLET | Refills: 1 | Status: SHIPPED | OUTPATIENT
Start: 2024-04-22

## 2024-04-22 RX ORDER — RESPIRATORY SYNCYTIAL VIRUS VACCINE 120MCG/0.5
0.5 KIT INTRAMUSCULAR ONCE
Qty: 1 EACH | Refills: 0 | Status: CANCELLED | OUTPATIENT
Start: 2024-04-22 | End: 2024-04-22

## 2024-04-22 ASSESSMENT — PAIN SCALES - GENERAL: PAINLEVEL: NO PAIN (0)

## 2024-04-22 NOTE — PROGRESS NOTES
"      Subjective   Donn is a 79 year old, presenting for the following health issues:  RECHECK        4/22/2024    11:40 AM   Additional Questions   Roomed by Padmini Reynaga     History of Present Illness       Reason for visit:  3 week follow up    He eats 2-3 servings of fruits and vegetables daily.He consumes 0 sweetened beverage(s) daily.He exercises with enough effort to increase his heart rate 9 or less minutes per day.  He exercises with enough effort to increase his heart rate 3 or less days per week.   He is taking medications regularly.           Itching in axillae and groin areas since before starting the chlorthalidone    Not worse with new med    No obvious side effects     Using some lotion to affected areas    Only rash is if scratches too much       Stress better     Chest pain is better    Staying active     Rebuilding dozers          Objective    /67 (BP Location: Right arm, Patient Position: Chair, Cuff Size: Adult Regular)   Pulse 59   Temp 97.2  F (36.2  C) (Temporal)   Resp 14   Ht 1.626 m (5' 4\")   Wt 68.2 kg (150 lb 6 oz)   SpO2 100%   BMI 25.81 kg/m    Body mass index is 25.81 kg/m .  Physical Exam  Constitutional:       Appearance: He is well-developed.   HENT:      Head: Normocephalic and atraumatic.   Eyes:      Conjunctiva/sclera: Conjunctivae normal.   Neck:      Vascular: No carotid bruit.   Cardiovascular:      Rate and Rhythm: Normal rate and regular rhythm.      Heart sounds: Normal heart sounds.   Pulmonary:      Effort: Pulmonary effort is normal. No respiratory distress.      Breath sounds: Normal breath sounds.   Neurological:      Mental Status: He is alert and oriented to person, place, and time.      Cranial Nerves: No cranial nerve deficit.   Psychiatric:         Speech: Speech normal.         Behavior: Behavior normal.      No edema    Radials symmetric    Reviewed labs    Axilla on examined side looks fine    ASSESSMENT / PLAN:  (L29.9) Itching  (primary " encounter diagnosis)  Comment: use moisturizing lotion and prn benadryl cream.  Try not to scratch.  Of note this started before we started the chlorthalidone.   Plan: as above; follow up prn     (I10) Hypertension goal BP (blood pressure) < 140/90  Comment: blood pressure fine now  Plan: chlorthalidone (HYGROTON) 25 MG tablet        Continue same trio of meds     (I25.10,  Z98.61) CAD S/P percutaneous coronary angioplasty  Comment: no symptoms   Plan: as above    See us this fall , sooner if needed       I reviewed the patient's medications, allergies, medical history, family history, and social history.    Ha Anderson MD                  Signed Electronically by: Ha Anderson MD

## 2024-04-22 NOTE — PATIENT INSTRUCTIONS
Stay on same medications    If you start to feel dizzy/ lightheaded or get low blood pressures let us know    Stay well hydrated    Continue other meds     See us in the fall, check labs then

## 2024-05-27 NOTE — PROGRESS NOTES
Chief Complaint - vibration or tingling sensation right ear    History of Present Illness - Donn Allen is a 79 year old male who presents to me today with vibration in right ear. It started as being constant, but now comes and goes. No pain. He feels like a pulsation or flutter. It started 2 months ago. It is getting better. Happened after being close to a very loud chain saw. No troubles in the left ear. Hearing is okay. No trauma.    Tests personally reviewed today for this visit:   1.) audiogram showed sensorineural hearing loss at 4k-8k Hz (2/2024)  2.) type A tymps (2/2024)    Past Medical History -   Patient Active Problem List   Diagnosis    Advanced directives, counseling/discussion    CAD S/P percutaneous coronary angioplasty    Hyperlipidemia LDL goal <70    Lattice degeneration    Epiretinal membrane, right eye    History of vitrectomy, od     Legal blindness, od    Glaucoma suspect    Lightheadedness    Gastroesophageal reflux disease, esophagitis presence not specified    Hypertension goal BP (blood pressure) < 140/90    Pseudophakia, os    Aphakia of right eye    Posterior vitreous detachment of left eye    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    Decreased vision in both eyes    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)    Presbyopia of both eyes    H/O retinal detachment    Vitreous floaters of left eye    Senile nuclear sclerosis       Current Medications -   Current Outpatient Medications:     amLODIPine (NORVASC) 5 MG tablet, Take 1 tablet (5 mg) by mouth daily, Disp: 90 tablet, Rfl: 3    aspirin 81 MG tablet, Take 1 tablet by mouth daily., Disp: , Rfl:     atorvastatin (LIPITOR) 40 MG tablet, Take 1 tablet (40 mg) by mouth daily, Disp: 90 tablet, Rfl: 3    chlorthalidone (HYGROTON) 25 MG tablet, Take 1 tablet (25 mg) by mouth daily, Disp: 90 tablet, Rfl: 1    lisinopril (ZESTRIL) 10 MG tablet, TAKE 1 TABLET (10 MG) BY MOUTH DAILY., Disp: 90 tablet, Rfl: 0    nitroGLYcerin (NITROSTAT) 0.4  MG sublingual tablet, DISSOLVE 1 TAB UNDER TONGUE EVERY 5MIN AS NEEDED FOR CHEST PAIN. MAX 3TAB. CALL 911 AFTER 2ND DOSE, Disp: 25 tablet, Rfl: 1    omeprazole (PRILOSEC) 20 MG DR capsule, TAKE 1 CAPSULE BY MOUTH EVERY DAY, Disp: 90 capsule, Rfl: 3    timolol maleate (TIMOPTIC) 0.5 % ophthalmic solution, Place 1 drop into the right eye daily WILL NEED APPT FOR MORE REFILLS., Disp: 5 mL, Rfl: 0    Vitamin D3 (CHOLECALCIFEROL) 25 mcg (1000 units) tablet, Take 1 tablet (25 mcg) by mouth daily, Disp: , Rfl:   No current facility-administered medications for this visit.    Facility-Administered Medications Ordered in Other Visits:     sodium chloride (PF) 0.9% PF flush 10 mL, 10 mL, Intracatheter, Once, GEORGI Willett MD    Allergies -   Allergies   Allergen Reactions    Ibuprofen      Other reaction(s): Unknown/Not Verified    Ibuprofen Sodium      ibu    Seasonal Allergies     Tetanus Immune Globulin Unknown    Tetanus Toxoid        Social History -   Social History     Socioeconomic History    Marital status:      Spouse name: Carolyn    Number of children: 4    Years of education: 14+    Highest education level: None   Occupational History    Occupation: Builder     Employer: RETIRED     Comment: Semi-retired   Tobacco Use    Smoking status: Former     Current packs/day: 0.00     Types: Cigarettes     Quit date: 1972     Years since quittin.4     Passive exposure: Never    Smokeless tobacco: Never    Tobacco comments:     non-smoking household   Vaping Use    Vaping status: Never Used   Substance and Sexual Activity    Alcohol use: Yes     Comment: occ beer / wine    Drug use: No    Sexual activity: Yes     Partners: Female     Social Determinants of Health     Financial Resource Strain: Low Risk  (2024)    Financial Resource Strain     Within the past 12 months, have you or your family members you live with been unable to get utilities (heat, electricity) when it was really needed?: No   Food  Insecurity: Low Risk  (1/8/2024)    Food Insecurity     Within the past 12 months, did you worry that your food would run out before you got money to buy more?: No     Within the past 12 months, did the food you bought just not last and you didn t have money to get more?: No   Transportation Needs: Low Risk  (1/8/2024)    Transportation Needs     Within the past 12 months, has lack of transportation kept you from medical appointments, getting your medicines, non-medical meetings or appointments, work, or from getting things that you need?: No   Interpersonal Safety: Low Risk  (1/15/2024)    Interpersonal Safety     Do you feel physically and emotionally safe where you currently live?: Yes     Within the past 12 months, have you been hit, slapped, kicked or otherwise physically hurt by someone?: No     Within the past 12 months, have you been humiliated or emotionally abused in other ways by your partner or ex-partner?: No   Housing Stability: Low Risk  (1/8/2024)    Housing Stability     Do you have housing? : Yes     Are you worried about losing your housing?: No       Family History -   Family History   Problem Relation Age of Onset    Cancer Father 73        lymphoma    Cataracts Father     Cancer Brother 73        pancreatic cancer 1/2 brother    Cancer Brother 83        stomach? 1/2 brother    Cancer Brother         lung or kidney?    Diabetes Maternal Aunt     Cerebrovascular Disease No family hx of     C.A.D. No family hx of     Alzheimer Disease No family hx of     Neurologic Disorder No family hx of     Glaucoma No family hx of     Macular Degeneration No family hx of        Physical Exam  General - The patient is in no distress.  Alert, answers questions and cooperates with examination appropriately.   Voice and Breathing - The patient was breathing comfortably without the use of accessory muscles. There was no wheezing, stridor, or stertor.  The patients voice was clear and strong.  Ears - The right ear  was examined. No infection. right tympanic membrane intact. No effusion or infection. The left ear was then examined. The canal was nonedematous and nonerythematous. No evidence of infection. The tympanic membrane was intact and the middle ear was well aerated.   Eyes - Extraocular movements intact.  Sclera were not icteric or injected.  Neck - Soft, non-tender. Palpation of the occipital, submental, submandibular, internal jugular chain, and supraclavicular nodes did not have lymphadenopathy    Assessment and Plan -     ICD-10-CM    1. 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 Sensorineural hearing loss (SNHL) of both ears  H90.3           Donn Allen is a 79 year old male who has intermittent vibration or rumble in right ear after noise. This seems most consistent with tensor tympani or stapedial myoclonus. It is getting better. I provided reassurance. He has sensorineural hearing loss, but not yet requiring hearing aids. Recheck hearing in 1 year.       Thom Botello MD  Otolaryngology  [facility]

## 2024-05-28 ENCOUNTER — TRANSFERRED RECORDS (OUTPATIENT)
Dept: HEALTH INFORMATION MANAGEMENT | Facility: CLINIC | Age: 79
End: 2024-05-28
Payer: MEDICARE

## 2024-05-29 ENCOUNTER — OFFICE VISIT (OUTPATIENT)
Dept: OTOLARYNGOLOGY | Facility: CLINIC | Age: 79
End: 2024-05-29
Payer: MEDICARE

## 2024-05-29 VITALS — DIASTOLIC BLOOD PRESSURE: 70 MMHG | SYSTOLIC BLOOD PRESSURE: 129 MMHG | OXYGEN SATURATION: 99 % | HEART RATE: 55 BPM

## 2024-05-29 DIAGNOSIS — H90.3 SENSORINEURAL HEARING LOSS (SNHL) OF BOTH EARS: ICD-10-CM

## 2024-05-29 DIAGNOSIS — H93.11 TENSOR TYMPANI INDUCED TINNITUS OF RIGHT EAR: Primary | ICD-10-CM

## 2024-05-29 PROCEDURE — 99203 OFFICE O/P NEW LOW 30 MIN: CPT | Performed by: OTOLARYNGOLOGY

## 2024-05-29 NOTE — LETTER
5/29/2024         RE: Donn Allen  [address] [address]        Dear Colleague,    Thank you for referring your patient, Donn Allen, to the [facility]. Please see a copy of my visit note below.    Chief Complaint - vibration or tingling sensation right ear    History of Present Illness - Donn Allen is a 79 year old male who presents to me today with vibration in right ear. It started as being constant, but now comes and goes. No pain. He feels like a pulsation or flutter. It started 2 months ago. It is getting better. Happened after being close to a very loud chain saw. No troubles in the left ear. Hearing is okay. No trauma.    Tests personally reviewed today for this visit:   1.) audiogram showed sensorineural hearing loss at 4k-8k Hz (2/2024)  2.) type A tymps (2/2024)    Past Medical History -   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     Decreased vision in both eyes     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)     Presbyopia of both eyes     H/O retinal detachment     Vitreous floaters of left eye     Senile nuclear sclerosis       Current Medications -   Current Outpatient Medications:      amLODIPine (NORVASC) 5 MG tablet, Take 1 tablet (5 mg) by mouth daily, Disp: 90 tablet, Rfl: 3     aspirin 81 MG tablet, Take 1 tablet by mouth daily., Disp: , Rfl:      atorvastatin (LIPITOR) 40 MG tablet, Take 1 tablet (40 mg) by  mouth daily, Disp: 90 tablet, Rfl: 3     chlorthalidone (HYGROTON) 25 MG tablet, Take 1 tablet (25 mg) by mouth daily, Disp: 90 tablet, Rfl: 1     lisinopril (ZESTRIL) 10 MG tablet, TAKE 1 TABLET (10 MG) BY MOUTH DAILY., Disp: 90 tablet, Rfl: 0     nitroGLYcerin (NITROSTAT) 0.4 MG sublingual tablet, DISSOLVE 1 TAB UNDER TONGUE EVERY 5MIN AS NEEDED FOR CHEST PAIN. MAX 3TAB. CALL 911 AFTER 2ND DOSE, Disp: 25 tablet, Rfl: 1     omeprazole (PRILOSEC) 20 MG DR capsule, TAKE 1 CAPSULE BY MOUTH EVERY DAY, Disp: 90 capsule, Rfl: 3     timolol maleate (TIMOPTIC) 0.5 % ophthalmic solution, Place 1 drop into the right eye daily WILL NEED APPT FOR MORE REFILLS., Disp: 5 mL, Rfl: 0     Vitamin D3 (CHOLECALCIFEROL) 25 mcg (1000 units) tablet, Take 1 tablet (25 mcg) by mouth daily, Disp: , Rfl:   No current facility-administered medications for this visit.    Facility-Administered Medications Ordered in Other Visits:      sodium chloride (PF) 0.9% PF flush 10 mL, 10 mL, Intracatheter, Once, GEORGI Willett MD    Allergies -   Allergies   Allergen Reactions     Ibuprofen      Other reaction(s): Unknown/Not Verified     Ibuprofen Sodium      ibu     Seasonal Allergies      Tetanus Immune Globulin Unknown     Tetanus Toxoid        Social History -   Social History     Socioeconomic History     Marital status:      Spouse name: Carolyn     Number of children: 4     Years of education: 14+     Highest education level: None   Occupational History     Occupation: Builder     Employer: RETIRED     Comment: Semi-retired   Tobacco Use     Smoking status: Former     Current packs/day: 0.00     Types: Cigarettes     Quit date: 1972     Years since quittin.4     Passive exposure: Never     Smokeless tobacco: Never     Tobacco comments:     non-smoking household   Vaping Use     Vaping status: Never Used   Substance and Sexual Activity     Alcohol use: Yes     Comment: occ beer / wine     Drug use: No     Sexual activity: Yes      Partners: Female     Social Determinants of Health     Financial Resource Strain: Low Risk  (1/8/2024)    Financial Resource Strain      Within the past 12 months, have you or your family members you live with been unable to get utilities (heat, electricity) when it was really needed?: No   Food Insecurity: Low Risk  (1/8/2024)    Food Insecurity      Within the past 12 months, did you worry that your food would run out before you got money to buy more?: No      Within the past 12 months, did the food you bought just not last and you didn t have money to get more?: No   Transportation Needs: Low Risk  (1/8/2024)    Transportation Needs      Within the past 12 months, has lack of transportation kept you from medical appointments, getting your medicines, non-medical meetings or appointments, work, or from getting things that you need?: No   Interpersonal Safety: Low Risk  (1/15/2024)    Interpersonal Safety      Do you feel physically and emotionally safe where you currently live?: Yes      Within the past 12 months, have you been hit, slapped, kicked or otherwise physically hurt by someone?: No      Within the past 12 months, have you been humiliated or emotionally abused in other ways by your partner or ex-partner?: No   Housing Stability: Low Risk  (1/8/2024)    Housing Stability      Do you have housing? : Yes      Are you worried about losing your housing?: No       Family History -   Family History   Problem Relation Age of Onset     Cancer Father 73        lymphoma     Cataracts Father      Cancer Brother 73        pancreatic cancer 1/2 brother     Cancer Brother 83        stomach? 1/2 brother     Cancer Brother         lung or kidney?     Diabetes Maternal Aunt      Cerebrovascular Disease No family hx of      C.A.D. No family hx of      Alzheimer Disease No family hx of      Neurologic Disorder No family hx of      Glaucoma No family hx of      Macular Degeneration No family hx of        Physical  Exam  General - The patient is in no distress.  Alert, answers questions and cooperates with examination appropriately.   Voice and Breathing - The patient was breathing comfortably without the use of accessory muscles. There was no wheezing, stridor, or stertor.  The patients voice was clear and strong.  Ears - The right ear was examined. No infection. right tympanic membrane intact. No effusion or infection. The left ear was then examined. The canal was nonedematous and nonerythematous. No evidence of infection. The tympanic membrane was intact and the middle ear was well aerated.   Eyes - Extraocular movements intact.  Sclera were not icteric or injected.  Neck - Soft, non-tender. Palpation of the occipital, submental, submandibular, internal jugular chain, and supraclavicular nodes did not have lymphadenopathy    Assessment and Plan -     ICD-10-CM    1. 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 Sensorineural hearing loss (SNHL) of both ears  H90.3           Donn Allen is a 79 year old male who has intermittent vibration or rumble in right ear after noise. This seems most consistent with tensor tympani or stapedial myoclonus. It is getting better. I provided reassurance. He has sensorineural hearing loss, but not yet requiring hearing aids. Recheck hearing in 1 year.       Thom Botello MD  Otolaryngology  [facility]      Again, thank you for allowing me to participate in the care of your patient.        Sincerely,        Thom Botello MD

## 2024-06-04 ENCOUNTER — TELEPHONE (OUTPATIENT)
Dept: OPHTHALMOLOGY | Facility: CLINIC | Age: 79
End: 2024-06-04
Payer: MEDICARE

## 2024-06-04 DIAGNOSIS — H40.001 GLAUCOMA SUSPECT OF RIGHT EYE: ICD-10-CM

## 2024-06-04 RX ORDER — TIMOLOL MALEATE 5 MG/ML
1 SOLUTION/ DROPS OPHTHALMIC DAILY
Qty: 5 ML | Refills: 0 | Status: SHIPPED | OUTPATIENT
Start: 2024-06-04 | End: 2024-08-01

## 2024-06-04 NOTE — TELEPHONE ENCOUNTER
Called patient and made appointment for CE with Dr. Price on 7/1/24 at 10:30 am. Will refill 1 bottle of timolol to get patient through until he can be seen.

## 2024-06-04 NOTE — TELEPHONE ENCOUNTER
[facility] Call Center    Phone Message    May a detailed message be left on voicemail: yes     Reason for Call: Medication Refill Request    Has the patient contacted the pharmacy for the refill? Yes   Name of medication being requested: timolol maleate (TIMOPTIC) 0.5 % ophthalmic solution   Provider who prescribed the medication: Dr. Price   Pharmacy: [facility] [id number] [id number] [facility]   Date medication is needed: ASAP       Action Taken: Message routed to:  Other: [facility] eye     Travel Screening: Not Applicable     Date of Service:

## 2024-06-05 ENCOUNTER — TRANSFERRED RECORDS (OUTPATIENT)
Dept: HEALTH INFORMATION MANAGEMENT | Facility: CLINIC | Age: 79
End: 2024-06-05
Payer: MEDICARE

## 2024-07-10 DIAGNOSIS — I10 HYPERTENSION GOAL BP (BLOOD PRESSURE) < 140/90: ICD-10-CM

## 2024-07-11 RX ORDER — LISINOPRIL 10 MG/1
10 TABLET ORAL DAILY
Qty: 90 TABLET | Refills: 2 | Status: SHIPPED | OUTPATIENT
Start: 2024-07-11

## 2024-08-01 ENCOUNTER — OFFICE VISIT (OUTPATIENT)
Dept: OPHTHALMOLOGY | Facility: CLINIC | Age: 79
End: 2024-08-01
Attending: FAMILY MEDICINE
Payer: MEDICARE

## 2024-08-01 DIAGNOSIS — H27.01 APHAKIA OF RIGHT EYE: ICD-10-CM

## 2024-08-01 DIAGNOSIS — Z96.1 PSEUDOPHAKIA: Primary | ICD-10-CM

## 2024-08-01 DIAGNOSIS — H54.8 LEGAL BLINDNESS: ICD-10-CM

## 2024-08-01 DIAGNOSIS — Z98.890 HISTORY OF VITRECTOMY: ICD-10-CM

## 2024-08-01 DIAGNOSIS — H40.001 GLAUCOMA SUSPECT OF RIGHT EYE: ICD-10-CM

## 2024-08-01 DIAGNOSIS — H52.4 PRESBYOPIA: ICD-10-CM

## 2024-08-01 DIAGNOSIS — Z01.01 ENCOUNTER FOR EXAMINATION OF EYES AND VISION WITH ABNORMAL FINDINGS: ICD-10-CM

## 2024-08-01 DIAGNOSIS — H54.7 VISION PROBLEM: ICD-10-CM

## 2024-08-01 DIAGNOSIS — Z86.69 H/O RETINAL DETACHMENT: ICD-10-CM

## 2024-08-01 DIAGNOSIS — H43.812 POSTERIOR VITREOUS DETACHMENT OF LEFT EYE: ICD-10-CM

## 2024-08-01 PROCEDURE — 92015 DETERMINE REFRACTIVE STATE: CPT | Mod: GY | Performed by: OPHTHALMOLOGY

## 2024-08-01 PROCEDURE — 92014 COMPRE OPH EXAM EST PT 1/>: CPT | Performed by: OPHTHALMOLOGY

## 2024-08-01 RX ORDER — TIMOLOL MALEATE 5 MG/ML
1 SOLUTION/ DROPS OPHTHALMIC DAILY
Qty: 10 ML | Refills: 3 | Status: SHIPPED | OUTPATIENT
Start: 2024-08-01

## 2024-08-01 ASSESSMENT — REFRACTION_MANIFEST
OD_SPHERE: BALANCE
OS_ADD: +2.75
OD_ADD: +2.75
OS_CYLINDER: +0.50
OS_AXIS: 155
OS_SPHERE: -2.75

## 2024-08-01 ASSESSMENT — CONF VISUAL FIELD
OD_INFERIOR_TEMPORAL_RESTRICTION: 0
OD_SUPERIOR_NASAL_RESTRICTION: 1
OS_INFERIOR_NASAL_RESTRICTION: 0
OS_SUPERIOR_NASAL_RESTRICTION: 0
OS_INFERIOR_TEMPORAL_RESTRICTION: 0
OS_NORMAL: 1
OS_SUPERIOR_TEMPORAL_RESTRICTION: 0
OD_SUPERIOR_TEMPORAL_RESTRICTION: 0
OD_INFERIOR_NASAL_RESTRICTION: 0

## 2024-08-01 ASSESSMENT — REFRACTION_WEARINGRX
OD_CYLINDER: SPHERE
OS_AXIS: 047
OD_ADD: +3.00
OS_ADD: +3.00
SPECS_TYPE: BIFOCAL
OS_SPHERE: -2.25
OD_SPHERE: PLANO
OS_CYLINDER: +0.75

## 2024-08-01 ASSESSMENT — VISUAL ACUITY
OD_CC: HM
METHOD: SNELLEN - LINEAR
OS_CC+: -1
OS_CC: 20/20
CORRECTION_TYPE: GLASSES

## 2024-08-01 ASSESSMENT — EXTERNAL EXAM - RIGHT EYE: OD_EXAM: 3+ BROW PTOSIS

## 2024-08-01 ASSESSMENT — CUP TO DISC RATIO
OD_RATIO: 0.9
OS_RATIO: 0.4

## 2024-08-01 ASSESSMENT — TONOMETRY
IOP_METHOD: APPLANATION
OD_IOP_MMHG: 11
OS_IOP_MMHG: 10

## 2024-08-01 ASSESSMENT — EXTERNAL EXAM - LEFT EYE: OS_EXAM: 3+ BROW PTOSIS

## 2024-08-01 ASSESSMENT — SLIT LAMP EXAM - LIDS
COMMENTS: 1+ DERMATOCHALASIS - UPPER LID
COMMENTS: 1+ DERMATOCHALASIS - UPPER LID

## 2024-08-01 NOTE — PROGRESS NOTES
" Current Eye Medications:  Timolol every morning right eye, can't remember if took this morning. Thinks he did.     Subjective:  Complete eye exam. Vision is poor right eye since eye injury over 20 years ago. Vision is doing well both eyes with glasses in distance and near left eye. No eye pain or discomfort in either eye.       Objective:  See Ophthalmology Exam.       Assessment:  Stable eye exam in patient who is functionally monocular.  Stable intraocular pressure right eye.      ICD-10-CM    1. Pseudophakia, os  Z96.1       2. Legal blindness, od  H54.8       3. History of vitrectomy, od   Z98.890       4. H/O retinal detachment  Z86.69       5. Vision problem  H54.7 Adult Eye  Referral      6. 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 Aphakia of right eye  H27.01       8. Posterior vitreous detachment of left eye  H43.812       9. Encounter for examination of eyes and vision with abnormal findings  Z01.01       10. Presbyopia  H52.4 EYE EXAM (SIMPLE-NONBILLABLE)     REFRACTION           Plan:  Continue:   Timolol (yellow top) every morning right eye.      May use artificial tears up to four times a day (like Refresh Optive, Systane Balance, or TheraTears. Avoid \"get the red out\" drops and generic artifical tears).     Wait at least 5 minutes between drops if using more than one at a time.     Glasses prescription given - optional    Return visit 4 months for an intraocular pressure check, glaucoma OCT, retinal OCT, and Hurt Visual Field.     Donn Price M.D.  [phone]         "

## 2024-08-01 NOTE — LETTER
"8/1/2024      Donn Allen  [address] [address]      Dear Colleague,    Thank you for referring your patient, Donn Allen, to the [facility]. Please see a copy of my visit note below.     Current Eye Medications:  Timolol every morning right eye, can't remember if took this morning. Thinks he did.     Subjective:  Complete eye exam. Vision is poor right eye since eye injury over 20 years ago. Vision is doing well both eyes with glasses in distance and near left eye. No eye pain or discomfort in either eye.       Objective:  See Ophthalmology Exam.       Assessment:  Stable eye exam in patient who is functionally monocular.  Stable intraocular pressure right eye.      ICD-10-CM    1. Pseudophakia, os  Z96.1       2. Legal blindness, od  H54.8       3. History of vitrectomy, od   Z98.890       4. H/O retinal detachment  Z86.69       5. Vision problem  H54.7 Adult Eye  Referral      6. 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 Aphakia of right eye  H27.01       8. Posterior vitreous detachment of left eye  H43.812       9. Encounter for examination of eyes and vision with abnormal findings  Z01.01       10. Presbyopia  H52.4 EYE EXAM (SIMPLE-NONBILLABLE)     REFRACTION           Plan:  Continue:   Timolol (yellow top) every morning right eye.      May use artificial tears up to four times a day (like Refresh Optive, Systane Balance, or TheraTears. Avoid \"get the red out\" drops and generic artifical tears).     Wait at least 5 minutes between drops if using more than one at a time.     Glasses prescription given - optional    Return visit 4 months for an intraocular pressure check, glaucoma OCT, retinal OCT, and Hurt Visual Field.     Donn Price M.D.  [phone]         Again, thank you for allowing me to participate in the care of your patient.        Sincerely,        Donn BACON" MD Dee

## 2024-08-01 NOTE — PATIENT INSTRUCTIONS
"Continue:   Timolol (yellow top) every morning right eye.      May use artificial tears up to four times a day (like Refresh Optive, Systane Balance, or TheraTears. Avoid \"get the red out\" drops and generic artifical tears).     Wait at least 5 minutes between drops if using more than one at a time.     Glasses prescription given - optional    Return visit 4 months for an intraocular pressure check, glaucoma OCT, retinal OCT, and Hurt Visual Field.     Donn Price M.D.  [phone]    "

## 2024-10-23 ENCOUNTER — OFFICE VISIT (OUTPATIENT)
Dept: FAMILY MEDICINE | Facility: CLINIC | Age: 79
End: 2024-10-23
Payer: MEDICARE

## 2024-10-23 VITALS
BODY MASS INDEX: 24.01 KG/M2 | HEART RATE: 55 BPM | SYSTOLIC BLOOD PRESSURE: 105 MMHG | TEMPERATURE: 98 F | RESPIRATION RATE: 19 BRPM | DIASTOLIC BLOOD PRESSURE: 62 MMHG | HEIGHT: 64 IN | WEIGHT: 140.6 LBS | OXYGEN SATURATION: 97 %

## 2024-10-23 DIAGNOSIS — A69.20 LYME DISEASE: Primary | ICD-10-CM

## 2024-10-23 DIAGNOSIS — I10 HYPERTENSION GOAL BP (BLOOD PRESSURE) < 140/90: ICD-10-CM

## 2024-10-23 PROCEDURE — 99213 OFFICE O/P EST LOW 20 MIN: CPT | Performed by: FAMILY MEDICINE

## 2024-10-23 RX ORDER — AMLODIPINE BESYLATE 2.5 MG/1
2.5 TABLET ORAL DAILY
Qty: 90 TABLET | Refills: 1 | Status: SHIPPED | OUTPATIENT
Start: 2024-10-23

## 2024-10-23 RX ORDER — DOXYCYCLINE 100 MG/1
100 CAPSULE ORAL 2 TIMES DAILY
Qty: 28 CAPSULE | Refills: 0 | Status: SHIPPED | OUTPATIENT
Start: 2024-10-23 | End: 2024-11-06

## 2024-10-23 ASSESSMENT — PAIN SCALES - GENERAL: PAINLEVEL_OUTOF10: NO PAIN (0)

## 2024-10-23 NOTE — PATIENT INSTRUCTIONS
Take the antibiotics  doxycyline with food 2x daily    Stop 5 mg  amlodipine    Start 2.5 mg   amlodipine  instead    Stay well hydrated    See us in about January , sooner if needed

## 2024-10-23 NOTE — PROGRESS NOTES
"      Subjective   Donn is a 79 year old, presenting for the following health issues:  deer tick         10/23/2024    10:59 AM   Additional Questions   Roomed by lynsey LAY     Pt was bite by many deer ticks on his abdominal area and legs.  Headaches, muscle aches, itching  No OTC's used.      Just closed up cabin    Lots of ticks this fall    Got 4 deer ticks or maybe up to 6    Had a bullseye on one of  them on left thigh                 Objective    /62 (BP Location: Right arm, Patient Position: Chair, Cuff Size: Adult Regular)   Pulse 55   Temp 98  F (36.7  C) (Temporal)   Resp 19   Ht 1.626 m (5' 4\")   Wt 63.8 kg (140 lb 9.6 oz)   SpO2 97%   BMI 24.13 kg/m    Body mass index is 24.13 kg/m .  Physical Exam    Full physical not done     Mentation and affect are fine    No tremor of speech or extremity    Patient has several very small scabbed over areas corresponding to recent tick bites    No obvious retained ticks seen    At this point no surrounding redness      ASSESSMENT / PLAN:  (A69.20) Lyme disease  (primary encounter diagnosis)  Comment: by history he did have the bullseye type lyme rash around at least one of the tick locations.  He has not had lyme in past that he knows of.  Prudent to treat. He agreed.   Plan: doxycycline hyclate (VIBRAMYCIN) 100 MG capsule             (I10) Hypertension goal BP (blood pressure) < 140/90  Comment: blood pressure too low. Occasionally lightheaded/ dizzy.    Plan: amLODIPine (NORVASC) 2.5 MG tablet        Cut dose of amlodipine in 1/2.      See us in Jan, sooner if needed.      I reviewed the patient's medications, allergies, medical history, family history, and social history.    Ha Anderson MD             Signed Electronically by: Ha Anderson MD    "

## 2024-10-29 DIAGNOSIS — I10 HYPERTENSION GOAL BP (BLOOD PRESSURE) < 140/90: ICD-10-CM

## 2024-10-29 RX ORDER — CHLORTHALIDONE 25 MG/1
25 TABLET ORAL DAILY
Qty: 90 TABLET | Refills: 1 | Status: SHIPPED | OUTPATIENT
Start: 2024-10-29

## 2024-12-04 ENCOUNTER — OFFICE VISIT (OUTPATIENT)
Dept: OPHTHALMOLOGY | Facility: CLINIC | Age: 79
End: 2024-12-04
Payer: MEDICARE

## 2024-12-04 DIAGNOSIS — H40.001 GLAUCOMA SUSPECT OF RIGHT EYE: Primary | ICD-10-CM

## 2024-12-04 ASSESSMENT — EXTERNAL EXAM - LEFT EYE: OS_EXAM: 3+ BROW PTOSIS

## 2024-12-04 ASSESSMENT — TONOMETRY
OS_IOP_MMHG: 13
OD_IOP_MMHG: 12
IOP_METHOD: APPLANATION

## 2024-12-04 ASSESSMENT — SLIT LAMP EXAM - LIDS
COMMENTS: 1+ DERMATOCHALASIS - UPPER LID
COMMENTS: 1+ DERMATOCHALASIS - UPPER LID

## 2024-12-04 ASSESSMENT — PACHYMETRY
OS_CT(UM): .559
EXAM_DATE: 12/5/2016
OD_CT(UM): .558

## 2024-12-04 ASSESSMENT — VISUAL ACUITY
METHOD: SNELLEN - LINEAR
CORRECTION_TYPE: GLASSES
OS_CC+: -1
OS_CC: 20/20
OD_CC: HM

## 2024-12-04 ASSESSMENT — EXTERNAL EXAM - RIGHT EYE: OD_EXAM: 3+ BROW PTOSIS

## 2024-12-04 NOTE — PATIENT INSTRUCTIONS
"Continue:   Timolol (yellow top) every morning in the right eye.    May use artificial tears up to four times a day (like Refresh Optive, Systane Balance, or TheraTears. Avoid \"get the red out\" drops and generic artifical tears).     Wait at least 5 minutes between drops if using more than one at a time.     Recommend wearing glasses full time to protect your better seeing eye.     Return visit in 8 months for a complete exam.     Donn Price M.D.  [phone]    "

## 2024-12-04 NOTE — PROGRESS NOTES
" Current Eye Medications:  timolol right eye each morning.  Last drops:  7am     Subjective:  Follow up glaucoma suspect, right eye.  The patient is here for a pressure check, OCT, and visual field.  No vision changes or concerns.      Objective:  See Ophthalmology Exam.       Assessment:  Stable intraocular pressures, glaucoma OCT, retinal OCT, and Hurt Visual Field both eyes in patient who is a treated glaucoma suspect of the right eye.      Plan:  Continue:   Timolol (yellow top) every morning in the right eye.    May use artificial tears up to four times a day (like Refresh Optive, Systane Balance, or TheraTears. Avoid \"get the red out\" drops and generic artifical tears).     Wait at least 5 minutes between drops if using more than one at a time.     Recommend wearing glasses full time to protect your better seeing eye.     Return visit in 8 months for a complete exam (plan complete exam 1 yr thereafter).     Donn Price M.D.  [phone]       "

## 2024-12-04 NOTE — LETTER
"12/4/2024      Donn Allen  [address] [facility] [address]      Dear Colleague,    Thank you for referring your patient, Donn Allen, to the [facility]. Please see a copy of my visit note below.     Current Eye Medications:  timolol right eye each morning.  Last drops:  7am     Subjective:  Follow up glaucoma suspect, right eye.  The patient is here for a pressure check, OCT, and visual field.  No vision changes or concerns.      Objective:  See Ophthalmology Exam.       Assessment:  Stable intraocular pressures, glaucoma OCT, retinal OCT, and Hurt Visual Field both eyes in patient who is a treated glaucoma suspect of the right eye.      Plan:  Continue:   Timolol (yellow top) every morning in the right eye.    May use artificial tears up to four times a day (like Refresh Optive, Systane Balance, or TheraTears. Avoid \"get the red out\" drops and generic artifical tears).     Wait at least 5 minutes between drops if using more than one at a time.     Recommend wearing glasses full time to protect your better seeing eye.     Return visit in 8 months for a complete exam (plan complete exam 1 yr thereafter).     Donn Price M.D.  [phone]         Again, thank you for allowing me to participate in the care of your patient.        Sincerely,        Donn Price MD  "

## 2024-12-16 ENCOUNTER — PATIENT OUTREACH (OUTPATIENT)
Dept: CARE COORDINATION | Facility: CLINIC | Age: 79
End: 2024-12-16
Payer: MEDICARE

## 2025-01-21 ENCOUNTER — OFFICE VISIT (OUTPATIENT)
Dept: FAMILY MEDICINE | Facility: CLINIC | Age: 80
End: 2025-01-21
Attending: FAMILY MEDICINE
Payer: MEDICARE

## 2025-01-21 VITALS
RESPIRATION RATE: 16 BRPM | HEIGHT: 64 IN | OXYGEN SATURATION: 98 % | BODY MASS INDEX: 24.92 KG/M2 | TEMPERATURE: 97.9 F | DIASTOLIC BLOOD PRESSURE: 67 MMHG | HEART RATE: 63 BPM | WEIGHT: 146 LBS | SYSTOLIC BLOOD PRESSURE: 154 MMHG

## 2025-01-21 DIAGNOSIS — Z98.61 CAD S/P PERCUTANEOUS CORONARY ANGIOPLASTY: ICD-10-CM

## 2025-01-21 DIAGNOSIS — R53.83 FATIGUE, UNSPECIFIED TYPE: ICD-10-CM

## 2025-01-21 DIAGNOSIS — L85.3 DRY SKIN: ICD-10-CM

## 2025-01-21 DIAGNOSIS — Z00.00 ENCOUNTER FOR MEDICARE ANNUAL WELLNESS EXAM: Primary | ICD-10-CM

## 2025-01-21 DIAGNOSIS — E55.9 VITAMIN D DEFICIENCY DISEASE: ICD-10-CM

## 2025-01-21 DIAGNOSIS — K21.9 GASTROESOPHAGEAL REFLUX DISEASE, UNSPECIFIED WHETHER ESOPHAGITIS PRESENT: ICD-10-CM

## 2025-01-21 DIAGNOSIS — Z12.5 SCREENING FOR PROSTATE CANCER: ICD-10-CM

## 2025-01-21 DIAGNOSIS — E78.5 HYPERLIPIDEMIA LDL GOAL <70: ICD-10-CM

## 2025-01-21 DIAGNOSIS — I25.10 CAD S/P PERCUTANEOUS CORONARY ANGIOPLASTY: ICD-10-CM

## 2025-01-21 DIAGNOSIS — R73.01 IMPAIRED FASTING GLUCOSE: ICD-10-CM

## 2025-01-21 DIAGNOSIS — I10 HYPERTENSION GOAL BP (BLOOD PRESSURE) < 140/90: ICD-10-CM

## 2025-01-21 LAB
ALBUMIN SERPL BCG-MCNC: 4.2 G/DL (ref 3.5–5.2)
ALP SERPL-CCNC: 110 U/L (ref 40–150)
ALT SERPL W P-5'-P-CCNC: 19 U/L (ref 0–70)
ANION GAP SERPL CALCULATED.3IONS-SCNC: 11 MMOL/L (ref 7–15)
AST SERPL W P-5'-P-CCNC: 31 U/L (ref 0–45)
BASOPHILS # BLD AUTO: 0 10E3/UL (ref 0–0.2)
BASOPHILS NFR BLD AUTO: 1 %
BILIRUB SERPL-MCNC: 0.2 MG/DL
BUN SERPL-MCNC: 27.2 MG/DL (ref 8–23)
CALCIUM SERPL-MCNC: 9.2 MG/DL (ref 8.8–10.4)
CHLORIDE SERPL-SCNC: 106 MMOL/L (ref 98–107)
CHOLEST SERPL-MCNC: 146 MG/DL
CREAT SERPL-MCNC: 1.16 MG/DL (ref 0.67–1.17)
EGFRCR SERPLBLD CKD-EPI 2021: 64 ML/MIN/1.73M2
EOSINOPHIL # BLD AUTO: 0.3 10E3/UL (ref 0–0.7)
EOSINOPHIL NFR BLD AUTO: 5 %
ERYTHROCYTE [DISTWIDTH] IN BLOOD BY AUTOMATED COUNT: 12.8 % (ref 10–15)
EST. AVERAGE GLUCOSE BLD GHB EST-MCNC: 123 MG/DL
FASTING STATUS PATIENT QL REPORTED: NO
FASTING STATUS PATIENT QL REPORTED: NO
GLUCOSE SERPL-MCNC: 94 MG/DL (ref 70–99)
HBA1C MFR BLD: 5.9 % (ref 0–5.6)
HCO3 SERPL-SCNC: 24 MMOL/L (ref 22–29)
HCT VFR BLD AUTO: 37.5 % (ref 40–53)
HDLC SERPL-MCNC: 70 MG/DL
HGB BLD-MCNC: 12.5 G/DL (ref 13.3–17.7)
IMM GRANULOCYTES # BLD: 0 10E3/UL
IMM GRANULOCYTES NFR BLD: 0 %
LDLC SERPL CALC-MCNC: 53 MG/DL
LYMPHOCYTES # BLD AUTO: 1.7 10E3/UL (ref 0.8–5.3)
LYMPHOCYTES NFR BLD AUTO: 27 %
MCH RBC QN AUTO: 30.3 PG (ref 26.5–33)
MCHC RBC AUTO-ENTMCNC: 33.3 G/DL (ref 31.5–36.5)
MCV RBC AUTO: 91 FL (ref 78–100)
MONOCYTES # BLD AUTO: 0.5 10E3/UL (ref 0–1.3)
MONOCYTES NFR BLD AUTO: 8 %
NEUTROPHILS # BLD AUTO: 3.7 10E3/UL (ref 1.6–8.3)
NEUTROPHILS NFR BLD AUTO: 60 %
NONHDLC SERPL-MCNC: 76 MG/DL
PLATELET # BLD AUTO: 255 10E3/UL (ref 150–450)
POTASSIUM SERPL-SCNC: 4.7 MMOL/L (ref 3.4–5.3)
PROT SERPL-MCNC: 7.1 G/DL (ref 6.4–8.3)
PSA SERPL DL<=0.01 NG/ML-MCNC: 3.59 NG/ML (ref 0–6.5)
RBC # BLD AUTO: 4.12 10E6/UL (ref 4.4–5.9)
SODIUM SERPL-SCNC: 141 MMOL/L (ref 135–145)
TRIGL SERPL-MCNC: 115 MG/DL
TSH SERPL DL<=0.005 MIU/L-ACNC: 3.18 UIU/ML (ref 0.3–4.2)
VIT D+METAB SERPL-MCNC: 38 NG/ML (ref 20–50)
WBC # BLD AUTO: 6.2 10E3/UL (ref 4–11)

## 2025-01-21 PROCEDURE — 99000 SPECIMEN HANDLING OFFICE-LAB: CPT | Performed by: FAMILY MEDICINE

## 2025-01-21 PROCEDURE — 84443 ASSAY THYROID STIM HORMONE: CPT | Performed by: FAMILY MEDICINE

## 2025-01-21 PROCEDURE — G0439 PPPS, SUBSEQ VISIT: HCPCS | Performed by: FAMILY MEDICINE

## 2025-01-21 PROCEDURE — 99214 OFFICE O/P EST MOD 30 MIN: CPT | Mod: 25 | Performed by: FAMILY MEDICINE

## 2025-01-21 PROCEDURE — 36415 COLL VENOUS BLD VENIPUNCTURE: CPT | Performed by: FAMILY MEDICINE

## 2025-01-21 PROCEDURE — 82306 VITAMIN D 25 HYDROXY: CPT | Performed by: FAMILY MEDICINE

## 2025-01-21 PROCEDURE — 80053 COMPREHEN METABOLIC PANEL: CPT | Performed by: FAMILY MEDICINE

## 2025-01-21 PROCEDURE — 83036 HEMOGLOBIN GLYCOSYLATED A1C: CPT | Performed by: FAMILY MEDICINE

## 2025-01-21 PROCEDURE — 84244 ASSAY OF RENIN: CPT | Mod: 90 | Performed by: FAMILY MEDICINE

## 2025-01-21 PROCEDURE — G0103 PSA SCREENING: HCPCS | Performed by: FAMILY MEDICINE

## 2025-01-21 PROCEDURE — 85025 COMPLETE CBC W/AUTO DIFF WBC: CPT | Performed by: FAMILY MEDICINE

## 2025-01-21 PROCEDURE — 80061 LIPID PANEL: CPT | Performed by: FAMILY MEDICINE

## 2025-01-21 PROCEDURE — G2211 COMPLEX E/M VISIT ADD ON: HCPCS | Performed by: FAMILY MEDICINE

## 2025-01-21 RX ORDER — LISINOPRIL 10 MG/1
10 TABLET ORAL DAILY
Qty: 90 TABLET | Refills: 1 | Status: SHIPPED | OUTPATIENT
Start: 2025-01-21

## 2025-01-21 RX ORDER — AMLODIPINE BESYLATE 2.5 MG/1
2.5 TABLET ORAL DAILY
Qty: 90 TABLET | Refills: 1 | Status: SHIPPED | OUTPATIENT
Start: 2025-01-21

## 2025-01-21 RX ORDER — ATORVASTATIN CALCIUM 40 MG/1
40 TABLET, FILM COATED ORAL DAILY
Qty: 90 TABLET | Refills: 3 | Status: SHIPPED | OUTPATIENT
Start: 2025-01-21

## 2025-01-21 RX ORDER — CHLORTHALIDONE 25 MG/1
25 TABLET ORAL DAILY
Qty: 90 TABLET | Refills: 1 | Status: SHIPPED | OUTPATIENT
Start: 2025-01-21

## 2025-01-21 SDOH — HEALTH STABILITY: PHYSICAL HEALTH: ON AVERAGE, HOW MANY DAYS PER WEEK DO YOU ENGAGE IN MODERATE TO STRENUOUS EXERCISE (LIKE A BRISK WALK)?: 4 DAYS

## 2025-01-21 ASSESSMENT — SOCIAL DETERMINANTS OF HEALTH (SDOH): HOW OFTEN DO YOU GET TOGETHER WITH FRIENDS OR RELATIVES?: THREE TIMES A WEEK

## 2025-01-21 NOTE — PATIENT INSTRUCTIONS
We will send you lab results    Use moisturizing lotion daily, generous amount    Restart the chlorthalidone    Continue other medications    See us in 2-3 months; recheck blood pressure at that time                      Patient Education   Preventive Care Advice   This is general advice given by our system to help you stay healthy. However, your care team may have specific advice just for you. Please talk to your care team about your preventive care needs.  Nutrition  Eat 5 or more servings of fruits and vegetables each day.  Try wheat bread, brown rice and whole grain pasta (instead of white bread, rice, and pasta).  Get enough calcium and vitamin D. Check the label on foods and aim for 100% of the RDA (recommended daily allowance).  Lifestyle  Exercise at least 150 minutes each week  (30 minutes a day, 5 days a week).  Do muscle strengthening activities 2 days a week. These help control your weight and prevent disease.  No smoking.  Wear sunscreen to prevent skin cancer.  Have a dental exam and cleaning every 6 months.  Yearly exams  See your health care team every year to talk about:  Any changes in your health.  Any medicines your care team has prescribed.  Preventive care, family planning, and ways to prevent chronic diseases.  Shots (vaccines)   HPV shots (up to age 26), if you've never had them before.  Hepatitis B shots (up to age 59), if you've never had them before.  COVID-19 shot: Get this shot when it's due.  Flu shot: Get a flu shot every year.  Tetanus shot: Get a tetanus shot every 10 years.  Pneumococcal, hepatitis A, and RSV shots: Ask your care team if you need these based on your risk.  Shingles shot (for age 50 and up)  General health tests  Diabetes screening:  Starting at age 35, Get screened for diabetes at least every 3 years.  If you are younger than age 35, ask your care team if you should be screened for diabetes.  Cholesterol test: At age 39, start having a cholesterol test every 5  years, or more often if advised.  Bone density scan (DEXA): At age 50, ask your care team if you should have this scan for osteoporosis (brittle bones).  Hepatitis C: Get tested at least once in your life.  STIs (sexually transmitted infections)  Before age 24: Ask your care team if you should be screened for STIs.  After age 24: Get screened for STIs if you're at risk. You are at risk for STIs (including HIV) if:  You are sexually active with more than one person.  You don't use condoms every time.  You or a partner was diagnosed with a sexually transmitted infection.  If you are at risk for HIV, ask about PrEP medicine to prevent HIV.  Get tested for HIV at least once in your life, whether you are at risk for HIV or not.  Cancer screening tests  Cervical cancer screening: If you have a cervix, begin getting regular cervical cancer screening tests starting at age 21.  Breast cancer scan (mammogram): If you've ever had breasts, begin having regular mammograms starting at age 40. This is a scan to check for breast cancer.  Colon cancer screening: It is important to start screening for colon cancer at age 45.  Have a colonoscopy test every 10 years (or more often if you're at risk) Or, ask your provider about stool tests like a FIT test every year or Cologuard test every 3 years.  To learn more about your testing options, visit:   .  For help making a decision, visit:   https://bit.ly/rl34808.  Prostate cancer screening test: If you have a prostate, ask your care team if a prostate cancer screening test (PSA) at age 55 is right for you.  Lung cancer screening: If you are a current or former smoker ages 50 to 80, ask your care team if ongoing lung cancer screenings are right for you.  For informational purposes only. Not to replace the advice of your health care provider. Copyright   2023 [employer]. All rights reserved. Clinically reviewed by the [facility]. [employer] 550553 -  REV 01/24.  Preventing Falls: Care Instructions  Injuries and health problems such as trouble walking or poor eyesight can increase your risk of falling. So can some medicines. But there are things you can do to help prevent falls. You can exercise to get stronger. You can also arrange your home to make it safer.    Talk to your doctor about the medicines you take. Ask if any of them increase the risk of falls and whether they can be changed or stopped.   Try to exercise regularly. It can help improve your strength and balance. This can help lower your risk of falling.         Practice fall safety and prevention.   Wear low-heeled shoes that fit well and give your feet good support. Talk to your doctor if you have foot problems that make this hard.  Carry a cellphone or wear a medical alert device that you can use to call for help.  Use stepladders instead of chairs to reach high objects. Don't climb if you're at risk for falls. Ask for help, if needed.  Wear the correct eyeglasses, if you need them.        Make your home safer.   Remove rugs, cords, clutter, and furniture from walkways.  Keep your house well lit. Use night-lights in hallways and bathrooms.  Install and use sturdy handrails on stairways.  Wear nonskid footwear, even inside. Don't walk barefoot or in socks without shoes.        Be safe outside.   Use handrails, curb cuts, and ramps whenever possible.  Keep your hands free by using a shoulder bag or backpack.  Try to walk in well-lit areas. Watch out for uneven ground, changes in pavement, and debris.  Be careful in the winter. Walk on the grass or gravel when sidewalks are slippery. Use de-icer on steps and walkways. Add non-slip devices to shoes.    Put grab bars and nonskid mats in your shower or tub and near the toilet. Try to use a shower chair or bath bench when bathing.   Get into a tub or shower by putting in your weaker leg first. Get out with your strong side first. Have a phone or medical  "alert device in the bathroom with you.   Where can you learn more?  Go to https://www.[employer].net/patiented  Enter G117 in the search box to learn more about \"Preventing Falls: Care Instructions.\"  Current as of: July 31, 2024  Content Version: 14.3    2024 [employer].   Care instructions adapted under license by your healthcare professional. If you have questions about a medical condition or this instruction, always ask your healthcare professional. [employer] disclaims any warranty or liability for your use of this information.    Hearing Loss: Care Instructions  Overview     Hearing loss is a sudden or slow decrease in how well you hear. It can range from slight to profound. Permanent hearing loss can occur with aging. It also can happen when you are exposed long-term to loud noise. Examples include listening to loud music, riding motorcycles, or being around other loud machines.  Hearing loss can affect your work and home life. It can make you feel lonely or depressed. You may feel that you have lost your independence. But hearing aids and other devices can help you hear better and feel connected to others.  Follow-up care is a key part of your treatment and safety. Be sure to make and go to all appointments, and call your doctor if you are having problems. It's also a good idea to know your test results and keep a list of the medicines you take.  How can you care for yourself at home?  Avoid loud noises whenever possible. This helps keep your hearing from getting worse.  Always wear hearing protection around loud noises.  Wear a hearing aid as directed.  A professional can help you pick a hearing aid that will work best for you.  You can also get hearing aids over the counter for mild to moderate hearing loss.  Have hearing tests as your doctor suggests. They can show whether your hearing has changed. Your hearing aid may need to be adjusted.  Use other devices as needed. These may " "include:  Telephone amplifiers and hearing aids that can connect to a television, stereo, radio, or microphone.  Devices that use lights or vibrations. These alert you to the doorbell, a ringing telephone, or a baby monitor.  Television closed-captioning. This shows the words at the bottom of the screen. Most new TVs can do this.  TTY (text telephone). This lets you type messages back and forth on the telephone instead of talking or listening. These devices are also called TDD. When messages are typed on the keyboard, they are sent over the phone line to a receiving TTY. The message is shown on a monitor.  Use text messaging, social media, and email if it is hard for you to communicate by telephone.  Try to learn a listening technique called speechreading. It is not lipreading. You pay attention to people's gestures, expressions, posture, and tone of voice. These clues can help you understand what a person is saying. Face the person you are talking to, and have them face you. Make sure the lighting is good. You need to see the other person's face clearly.  Think about counseling if you need help to adjust to your hearing loss.  When should you call for help?  Watch closely for changes in your health, and be sure to contact your doctor if:    You think your hearing is getting worse.     You have new symptoms, such as dizziness or nausea.   Where can you learn more?  Go to https://www.[employer].net/patiented  Enter R798 in the search box to learn more about \"Hearing Loss: Care Instructions.\"  Current as of: September 27, 2023  Content Version: 14.3    2024 [employer].   Care instructions adapted under license by your healthcare professional. If you have questions about a medical condition or this instruction, always ask your healthcare professional. [employer] disclaims any warranty or liability for your use of this information.    Learning About Stress  What is stress?     Stress is your " body's response to a hard situation. Your body can have a physical, emotional, or mental response. Stress is a fact of life for most people, and it affects everyone differently. What causes stress for you may not be stressful for someone else.  A lot of things can cause stress. You may feel stress when you go on a job interview, take a test, or run a race. This kind of short-term stress is normal and even useful. It can help you if you need to work hard or react quickly. For example, stress can help you finish an important job on time.  Long-term stress is caused by ongoing stressful situations or events. Examples of long-term stress include long-term health problems, ongoing problems at work, or conflicts in your family. Long-term stress can harm your health.  How does stress affect your health?  When you are stressed, your body responds as though you are in danger. It makes hormones that speed up your heart, make you breathe faster, and give you a burst of energy. This is called the fight-or-flight stress response. If the stress is over quickly, your body goes back to normal and no harm is done.  But if stress happens too often or lasts too long, it can have bad effects. Long-term stress can make you more likely to get sick, and it can make symptoms of some diseases worse. If you tense up when you are stressed, you may develop neck, shoulder, or low back pain. Stress is linked to high blood pressure and heart disease.  Stress also harms your emotional health. It can make you goel, tense, or depressed. Your relationships may suffer, and you may not do well at work or school.  What can you do to manage stress?  You can try these things to help manage stress:   Do something active. Exercise or activity can help reduce stress. Walking is a great way to get started. Even everyday activities such as housecleaning or yard work can help.  Try yoga or jackie chi. These techniques combine exercise and meditation. You may need  some training at first to learn them.  Do something you enjoy. For example, listen to music or go to a movie. Practice your hobby or do volunteer work.  Meditate. This can help you relax, because you are not worrying about what happened before or what may happen in the future.  Do guided imagery. Imagine yourself in any setting that helps you feel calm. You can use online videos, books, or a teacher to guide you.  Do breathing exercises. For example:  From a standing position, bend forward from the waist with your knees slightly bent. Let your arms dangle close to the floor.  Breathe in slowly and deeply as you return to a standing position. Roll up slowly and lift your head last.  Hold your breath for just a few seconds in the standing position.  Breathe out slowly and bend forward from the waist.  Let your feelings out. Talk, laugh, cry, and express anger when you need to. Talking with supportive friends or family, a counselor, or a nohemy leader about your feelings is a healthy way to relieve stress. Avoid discussing your feelings with people who make you feel worse.  Write. It may help to write about things that are bothering you. This helps you find out how much stress you feel and what is causing it. When you know this, you can find better ways to cope.  What can you do to prevent stress?  You might try some of these things to help prevent stress:  Manage your time. This helps you find time to do the things you want and need to do.  Get enough sleep. Your body recovers from the stresses of the day while you are sleeping.  Get support. Your family, friends, and community can make a difference in how you experience stress.  Limit your news feed. Avoid or limit time on social media or news that may make you feel stressed.  Do something active. Exercise or activity can help reduce stress. Walking is a great way to get started.  Where can you learn more?  Go to https://www.healthwise.net/patiented  Enter N032 in the  "search box to learn more about \"Learning About Stress.\"  Current as of: October 24, 2023  Content Version: 14.3    2024 [employer].   Care instructions adapted under license by your healthcare professional. If you have questions about a medical condition or this instruction, always ask your healthcare professional. [employer] disclaims any warranty or liability for your use of this information.    Bladder Training: Care Instructions  Your Care Instructions     Bladder training is used to treat urge incontinence and stress incontinence. Urge incontinence means that the need to urinate comes on so fast that you can't get to a toilet in time. Stress incontinence means that you leak urine because of pressure on your bladder. For example, it may happen when you laugh, cough, or lift something heavy.  Bladder training can increase how long you can wait before you have to urinate. It can also help your bladder hold more urine. And it can give you better control over the urge to urinate.  It is important to remember that bladder training takes a few weeks to a few months to make a difference. You may not see results right away, but don't give up.  Follow-up care is a key part of your treatment and safety. Be sure to make and go to all appointments, and call your doctor if you are having problems. It's also a good idea to know your test results and keep a list of the medicines you take.  How can you care for yourself at home?  Work with your doctor to come up with a bladder training program that is right for you. You may use one or more of the following methods.  Delayed urination  In the beginning, try to keep from urinating for 5 minutes after you first feel the need to go.  While you wait, take deep, slow breaths to relax. Kegel exercises can also help you delay the need to go to the bathroom.  After some practice, when you can easily wait 5 minutes to urinate, try to wait 10 minutes before you " "urinate.  Slowly increase the waiting period until you are able to control when you have to urinate.  Scheduled urination  Empty your bladder when you first wake up in the morning.  Schedule times throughout the day when you will urinate.  Start by going to the bathroom every hour, even if you don't need to go.  Slowly increase the time between trips to the bathroom.  When you have found a schedule that works well for you, keep doing it.  If you wake up during the night and have to urinate, do it. Apply your schedule to waking hours only.  Kegel exercises  These tighten and strengthen pelvic muscles, which can help you control the flow of urine. (If doing these exercises causes pain, stop doing them and talk with your doctor.) To do Kegel exercises:  Squeeze your muscles as if you were trying not to pass gas. Or squeeze your muscles as if you were stopping the flow of urine. Your belly, legs, and buttocks shouldn't move.  Hold the squeeze for 3 seconds, then relax for 5 to 10 seconds.  Start with 3 seconds, then add 1 second each week until you are able to squeeze for 10 seconds.  Repeat the exercise 10 times a session. Do 3 to 8 sessions a day.  When should you call for help?  Watch closely for changes in your health, and be sure to contact your doctor if:    Your incontinence is getting worse.     You do not get better as expected.   Where can you learn more?  Go to https://www.[employer].net/patiented  Enter V684 in the search box to learn more about \"Bladder Training: Care Instructions.\"  Current as of: April 30, 2024  Content Version: 14.3    2024 [employer].   Care instructions adapted under license by your healthcare professional. If you have questions about a medical condition or this instruction, always ask your healthcare professional. [employer] disclaims any warranty or liability for your use of this information.    Substance Use Disorder: Care Instructions  Overview     You can " improve your life and health by stopping your use of alcohol or drugs. When you don't drink or use drugs, you may feel and sleep better. You may get along better with your family, friends, and coworkers. There are medicines and programs that can help with substance use disorder.  How can you care for yourself at home?  Here are some ways to help you stay sober and prevent relapse.  If you have been given medicine to help keep you sober or reduce your cravings, be sure to take it exactly as prescribed.  Talk to your doctor about programs that can help you stop using drugs or drinking alcohol.  Do not keep alcohol or drugs in your home.  Plan ahead. Think about what you'll say if other people ask you to drink or use drugs. Try not to spend time with people who drink or use drugs.  Use the time and money spent on drinking or drugs to do something that's important to you.  Preventing a relapse  Have a plan to deal with relapse. Learn to recognize changes in your thinking that lead you to drink or use drugs. Get help before you start to drink or use drugs again.  Try to stay away from situations, friends, or places that may lead you to drink or use drugs.  If you feel the need to drink alcohol or use drugs again, seek help right away. Call a trusted friend or family member. Some people get support from organizations such as Narcotics Anonymous or [employer] or from treatment facilities.  If you relapse, get help as soon as you can. Some people make a plan with another person that outlines what they want that person to do for them if they relapse. The plan usually includes how to handle the relapse and who to notify in case of relapse.  Don't give up. Remember that a relapse doesn't mean that you have failed. Use the experience to learn the triggers that lead you to drink or use drugs. Then quit again. Recovery is a lifelong process. Many people have several relapses before they are able to quit for good.  Follow-up  "care is a flowers part of your treatment and safety. Be sure to make and go to all appointments, and call your doctor if you are having problems. It's also a good idea to know your test results and keep a list of the medicines you take.  When should you call for help?   Call 911  anytime you think you may need emergency care. For example, call if you or someone else:    Has overdosed or has withdrawal signs. Be sure to tell the emergency workers that you are or someone else is using or trying to quit using drugs. Overdose or withdrawal signs may include:  Losing consciousness.  Seizure.  Seeing or hearing things that aren't there (hallucinations).     Is thinking or talking about suicide or harming others.   Where to get help 24 hours a day, 7 days a week   If you or someone you know talks about suicide, self-harm, a mental health crisis, a substance use crisis, or any other kind of emotional distress, get help right away. You can:    Call the Suicide and Crisis Lifeline at 988.     Call 5-141-426-TALK (1-746.432.5459).     Text HOME to 466288 to access the Crisis Text Line.   Consider saving these numbers in your phone.  Go to [facility] for more information or to chat online.  Call your doctor now or seek immediate medical care if:    You are having withdrawal symptoms. These may include nausea or vomiting, sweating, shakiness, and anxiety.   Watch closely for changes in your health, and be sure to contact your doctor if:    You have a relapse.     You need more help or support to stop.   Where can you learn more?  Go to https://www.[employer].net/patiented  Enter H573 in the search box to learn more about \"Substance Use Disorder: Care Instructions.\"  Current as of: November 15, 2023  Content Version: 14.3    2024 [employer] [facility] [employer].   Care instructions adapted under license by your healthcare professional. If you have questions about a medical condition or this instruction, always ask your healthcare " professional. [email or web address] [employer], [facility] disclaims any warranty or liability for your use of this information.

## 2025-01-21 NOTE — PROGRESS NOTES
"Preventive Care Visit  [facility], [facility]  Jan 21, 2025      Assessment & Plan     Encounter for Medicare annual wellness exam       Hyperlipidemia LDL goal <70  Refill med, recheck labs   - atorvastatin (LIPITOR) 40 MG tablet; Take 1 tablet (40 mg) by mouth daily.  - Comprehensive metabolic panel; Future  - Lipid panel reflex to direct LDL Non-fasting; Future  - Comprehensive metabolic panel  - Lipid panel reflex to direct LDL Non-fasting    Gastroesophageal reflux disease, unspecified whether esophagitis present   Refill   - omeprazole (PRILOSEC) 20 MG DR capsule; TAKE 1 CAPSULE BY MOUTH EVERY DAY    Hypertension goal BP (blood pressure) < 140/90   Restart chlorthalidone   - lisinopril (ZESTRIL) 10 MG tablet; Take 1 tablet (10 mg) by mouth daily.  - chlorthalidone (HYGROTON) 25 MG tablet; Take 1 tablet (25 mg) by mouth daily.  - amLODIPine (NORVASC) 2.5 MG tablet; Take 1 tablet (2.5 mg) by mouth daily.  - Aldosterone; Future  - Renin activity; Future  - Aldosterone Renin Ratio; Future  - Aldosterone Renin Ratio    CAD S/P percutaneous coronary angioplasty  No symptoms currently     Impaired fasting glucose  Check   - Hemoglobin A1c; Future  - Hemoglobin A1c    Screening for prostate cancer     - Prostate Specific Antigen Screen; Future  - Prostate Specific Antigen Screen    Fatigue, unspecified type  Chck   - CBC with Platelets & Differential; Future  - TSH with free T4 reflex; Future  - CBC with Platelets & Differential  - TSH with free T4 reflex    Dry skin  Advised daily generous lotion usage     Vitamin D deficiency disease  Check ; takes over the counter vit D  - Vitamin D Deficiency; Future  - Vitamin D Deficiency    Patient has been advised of split billing requirements and indicates understanding: Yes        BMI  Estimated body mass index is 25.06 kg/m  as calculated from the following:    Height as of this encounter: 1.626 m (5' 4\").    Weight as of " this encounter: 66.2 kg (146 lb).   Weight management plan: only very slight high bmi, not worrisome    Counseling  Appropriate preventive services were addressed with this patient via screening, questionnaire, or discussion as appropriate for fall prevention, nutrition, physical activity, Tobacco-use cessation, social engagement, weight loss and cognition.  Checklist reviewing preventive services available has been given to the patient.  Reviewed patient's diet, addressing concerns and/or questions.   He is at risk for psychosocial distress and has been provided with information to reduce risk.   The patient was provided with written information regarding signs of hearing loss.   Information on urinary incontinence and treatment options given to patient.           Subjective   Donn is a 79 year old, presenting for the following:  Physical (Annual/-itches all over that has been ongoing since summer/-bumps (5) on his body all where he had found ticks on his skin/-left abd pain last week)        1/21/2025    10:37 AM   Additional Questions   Roomed by brian LAY       Itch constantly all over    Takes showers daily, not long shower    Not using lotion    5 areas where had wood ticks in skin. 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      1/21/2025   General Health   How would you rate your overall physical health? (!) FAIR   Feel stress (tense, anxious, or unable to sleep) To  some extent   (!) STRESS CONCERN      1/21/2025   Nutrition   Diet: Low fat/cholesterol         1/21/2025   Exercise   Days per week of moderate/strenous exercise 4 days         1/21/2025   Social Factors   Frequency of gathering with friends or relatives Three times a week   Worry food won't last until get money to buy more No   Food not last or not have enough money for food? No   Do you have housing? (Housing is defined as stable permanent housing and does not include staying ouside in a car, in a tent, in an abandoned building, in an overnight shelter, or couch-surfing.) Yes   Are you worried about losing your housing? No   Lack of transportation? No   Unable to get utilities (heat,electricity)? No         1/21/2025   Fall Risk   Fallen 2 or more times in the past year? No   Trouble with walking or balance? Yes           1/21/2025   Activities of Daily Living- Home Safety   Needs help with the following daily activites None of the above   Safety concerns in the home None of the above         1/21/2025   Dental   Dentist two times every year? Yes         1/21/2025   Hearing Screening   Hearing concerns? (!) I NEED TO ASK PEOPLE TO SPEAK UP OR REPEAT THEMSELVES.         1/21/2025   Driving Risk Screening   Patient/family members have concerns about driving No         1/21/2025   General Alertness/Fatigue Screening   Have you been more tired than usual lately? No         1/21/2025   Urinary Incontinence Screening   Bothered by leaking urine in past 6 months Yes         1/21/2025   TB Screening   Were you born outside of the [place]? No         Today's PHQ-2 Score:       1/21/2025    10:40 AM   PHQ-2 ( 1999 [employer])   Q1: Little interest or pleasure in doing things 0   Q2: Feeling down, depressed or hopeless 0   PHQ-2 Score 0           1/21/2025   Substance Use   Alcohol more than 3/day or more than 7/wk No   Do you have a current opioid prescription? No   How severe/bad is pain from 1 to 10? 0/10 (No Pain)   Do you use  any other substances recreationally? (!) PRESCRIPTION DRUGS     Social History     Tobacco Use    Smoking status: Former     Current packs/day: 0.00     Types: Cigarettes     Quit date: 1972     Years since quittin.0     Passive exposure: Never    Smokeless tobacco: Never    Tobacco comments:     non-smoking household   Vaping Use    Vaping status: Never Used   Substance Use Topics    Alcohol use: Yes     Comment: occ beer / wine    Drug use: No       ASCVD Risk   The 10-year ASCVD risk score (Maylin NICKERSON, et al., 2019) is: 42.6%    Values used to calculate the score:      Age: 79 years      Sex: Male      Is Non- : No      Diabetic: No      Tobacco smoker: No      Systolic Blood Pressure: 163 mmHg      Is BP treated: Yes      HDL Cholesterol: 79 mg/dL      Total Cholesterol: 141 mg/dL            Reviewed and updated as needed this visit by Provider                       Current providers sharing in care for this patient include:  Patient Care Team:  Ha Anderson MD as PCP - General  Iesha Pedraza MD as Referring Physician (Emergency Medicine)  Ha Anderson MD as Assigned PCP  Norm Cortes AuD as Audiologist (Audiology)  Thom Botello MD as MD (Otolaryngology)  Donn Price MD as MD (Ophthalmology)  Donn Price MD as Assigned Surgical Provider    The following health maintenance items are reviewed in Epic and correct as of today:  Health Maintenance   Topic Date Due    DTAP/TDAP/TD IMMUNIZATION (1 - Tdap) Never done    ZOSTER IMMUNIZATION (1 of 2) Never done    RSV VACCINE (1 - 1-dose 75+ series) Never done    ANNUAL REVIEW OF HM ORDERS  2024    BMP  01/15/2025    LIPID  01/15/2025    MEDICARE ANNUAL WELLNESS VISIT  01/15/2025    EYE EXAM  2025    FALL RISK ASSESSMENT  2026    GLUCOSE  01/15/2027    ADVANCE CARE PLANNING  01/15/2029    HEPATITIS C SCREENING  Completed    PHQ-2 (once per calendar year)  Completed     "INFLUENZA VACCINE  Completed    Pneumococcal Vaccine: 50+ Years  Completed    COVID-19 Vaccine  Completed    HPV IMMUNIZATION  Aged Out    MENINGITIS IMMUNIZATION  Aged Out    RSV MONOCLONAL ANTIBODY  Aged Out    COLORECTAL CANCER SCREENING  Discontinued            Objective    Exam  BP (!) 163/72   Pulse 63   Temp 97.9  F (36.6  C) (Temporal)   Resp 16   Ht 1.626 m (5' 4\")   Wt 66.2 kg (146 lb)   SpO2 98%   BMI 25.06 kg/m     Estimated body mass index is 25.06 kg/m  as calculated from the following:    Height as of this encounter: 1.626 m (5' 4\").    Weight as of this encounter: 66.2 kg (146 lb).    Physical Exam  GENERAL: alert and no distress  EYES: Eyes grossly normal to inspection, PERRL and conjunctivae and sclerae normal  HENT: ear canals and TM's normal, nose and mouth without ulcers or lesions  NECK: no adenopathy, no asymmetry, masses, or scars  RESP: lungs clear to auscultation - no rales, rhonchi or wheezes  CV: regular rate and rhythm, normal S1 S2, no S3 or S4, no murmur, click or rub, no peripheral edema  ABDOMEN: soft, nontender, no hepatosplenomegaly, no masses and bowel sounds normal  MS: no gross musculoskeletal defects noted, no edema  SKIN: no suspicious lesions or rashes but quite dry.  The old tick areas are just mildly indurated areas.   NEURO: Normal strength and tone, mentation intact and speech normal  PSYCH: mentation appears normal, affect normal/bright         No data to display                       Signed Electronically by: Ha Anderson MD    "

## 2025-01-22 NOTE — RESULT ENCOUNTER NOTE
"These results are stable.    The high urea nitrogen means you were somewhat dehydrated when you had blood drawn yesterday.  Stay well hydrated.    Hemoglobin a1c still in \"prediabetes\" range.     Other labs okay.    Some results still pending.    Ha Anderson MD      "

## 2025-05-25 ENCOUNTER — HEALTH MAINTENANCE LETTER (OUTPATIENT)
Age: 80
End: 2025-05-25

## 2025-08-10 DIAGNOSIS — I10 HYPERTENSION GOAL BP (BLOOD PRESSURE) < 140/90: ICD-10-CM

## 2025-08-10 RX ORDER — LISINOPRIL 10 MG/1
10 TABLET ORAL DAILY
Qty: 90 TABLET | Refills: 0 | Status: SHIPPED | OUTPATIENT
Start: 2025-08-10

## 2025-08-10 RX ORDER — AMLODIPINE BESYLATE 2.5 MG/1
2.5 TABLET ORAL DAILY
Qty: 90 TABLET | Refills: 0 | Status: SHIPPED | OUTPATIENT
Start: 2025-08-10

## 2025-08-11 DIAGNOSIS — H40.001 GLAUCOMA SUSPECT OF RIGHT EYE: ICD-10-CM

## 2025-08-11 RX ORDER — TIMOLOL MALEATE 5 MG/ML
1 SOLUTION/ DROPS OPHTHALMIC DAILY
Qty: 10 ML | Refills: 3 | Status: SHIPPED | OUTPATIENT
Start: 2025-08-11